# Patient Record
Sex: FEMALE | Race: WHITE | NOT HISPANIC OR LATINO | Employment: FULL TIME | URBAN - METROPOLITAN AREA
[De-identification: names, ages, dates, MRNs, and addresses within clinical notes are randomized per-mention and may not be internally consistent; named-entity substitution may affect disease eponyms.]

---

## 2017-03-16 ENCOUNTER — OFFICE VISIT (OUTPATIENT)
Dept: ALLERGY | Facility: CLINIC | Age: 37
End: 2017-03-16
Payer: COMMERCIAL

## 2017-03-16 VITALS
SYSTOLIC BLOOD PRESSURE: 104 MMHG | HEIGHT: 63 IN | OXYGEN SATURATION: 100 % | TEMPERATURE: 97.8 F | WEIGHT: 150.6 LBS | BODY MASS INDEX: 26.68 KG/M2 | HEART RATE: 93 BPM | DIASTOLIC BLOOD PRESSURE: 64 MMHG

## 2017-03-16 DIAGNOSIS — L29.9 PRURITIC DISORDER: Primary | ICD-10-CM

## 2017-03-16 DIAGNOSIS — R21 RASH: ICD-10-CM

## 2017-03-16 LAB
ALBUMIN SERPL-MCNC: 4.1 G/DL (ref 3.4–5)
ALP SERPL-CCNC: 88 U/L (ref 40–150)
ALT SERPL W P-5'-P-CCNC: 23 U/L (ref 0–50)
ANION GAP SERPL CALCULATED.3IONS-SCNC: 8 MMOL/L (ref 3–14)
AST SERPL W P-5'-P-CCNC: 11 U/L (ref 0–45)
BASOPHILS # BLD AUTO: 0 10E9/L (ref 0–0.2)
BASOPHILS NFR BLD AUTO: 0.1 %
BILIRUB SERPL-MCNC: 0.5 MG/DL (ref 0.2–1.3)
BUN SERPL-MCNC: 16 MG/DL (ref 7–30)
CALCIUM SERPL-MCNC: 8.9 MG/DL (ref 8.5–10.1)
CHLORIDE SERPL-SCNC: 103 MMOL/L (ref 94–109)
CO2 SERPL-SCNC: 27 MMOL/L (ref 20–32)
CREAT SERPL-MCNC: 0.68 MG/DL (ref 0.52–1.04)
DIFFERENTIAL METHOD BLD: NORMAL
EOSINOPHIL # BLD AUTO: 0.1 10E9/L (ref 0–0.7)
EOSINOPHIL NFR BLD AUTO: 1.3 %
ERYTHROCYTE [DISTWIDTH] IN BLOOD BY AUTOMATED COUNT: 12.4 % (ref 10–15)
GFR SERPL CREATININE-BSD FRML MDRD: NORMAL ML/MIN/1.7M2
GLUCOSE SERPL-MCNC: 83 MG/DL (ref 70–99)
HCT VFR BLD AUTO: 42 % (ref 35–47)
HGB BLD-MCNC: 14.1 G/DL (ref 11.7–15.7)
LYMPHOCYTES # BLD AUTO: 2.2 10E9/L (ref 0.8–5.3)
LYMPHOCYTES NFR BLD AUTO: 27.8 %
MCH RBC QN AUTO: 31.7 PG (ref 26.5–33)
MCHC RBC AUTO-ENTMCNC: 33.6 G/DL (ref 31.5–36.5)
MCV RBC AUTO: 94 FL (ref 78–100)
MONOCYTES # BLD AUTO: 0.6 10E9/L (ref 0–1.3)
MONOCYTES NFR BLD AUTO: 7.7 %
NEUTROPHILS # BLD AUTO: 5 10E9/L (ref 1.6–8.3)
NEUTROPHILS NFR BLD AUTO: 63.1 %
PLATELET # BLD AUTO: 271 10E9/L (ref 150–450)
POTASSIUM SERPL-SCNC: 3.9 MMOL/L (ref 3.4–5.3)
PROT SERPL-MCNC: 7.9 G/DL (ref 6.8–8.8)
RBC # BLD AUTO: 4.45 10E12/L (ref 3.8–5.2)
SODIUM SERPL-SCNC: 138 MMOL/L (ref 133–144)
TSH SERPL DL<=0.05 MIU/L-ACNC: 2.97 MU/L (ref 0.4–4)
WBC # BLD AUTO: 7.8 10E9/L (ref 4–11)

## 2017-03-16 PROCEDURE — 80053 COMPREHEN METABOLIC PANEL: CPT | Performed by: ALLERGY & IMMUNOLOGY

## 2017-03-16 PROCEDURE — 99203 OFFICE O/P NEW LOW 30 MIN: CPT | Performed by: ALLERGY & IMMUNOLOGY

## 2017-03-16 PROCEDURE — 86003 ALLG SPEC IGE CRUDE XTRC EA: CPT | Performed by: ALLERGY & IMMUNOLOGY

## 2017-03-16 PROCEDURE — 85025 COMPLETE CBC W/AUTO DIFF WBC: CPT | Performed by: ALLERGY & IMMUNOLOGY

## 2017-03-16 PROCEDURE — 99000 SPECIMEN HANDLING OFFICE-LAB: CPT | Performed by: ALLERGY & IMMUNOLOGY

## 2017-03-16 PROCEDURE — 83520 IMMUNOASSAY QUANT NOS NONAB: CPT | Mod: 90 | Performed by: ALLERGY & IMMUNOLOGY

## 2017-03-16 PROCEDURE — 84443 ASSAY THYROID STIM HORMONE: CPT | Performed by: ALLERGY & IMMUNOLOGY

## 2017-03-16 PROCEDURE — 36415 COLL VENOUS BLD VENIPUNCTURE: CPT | Performed by: ALLERGY & IMMUNOLOGY

## 2017-03-16 ASSESSMENT — ENCOUNTER SYMPTOMS
EYE ITCHING: 1
SORE THROAT: 0
MYALGIAS: 0
JOINT SWELLING: 0
VOMITING: 0
APPETITE CHANGE: 0
STRIDOR: 0
ARTHRALGIAS: 0
UNEXPECTED WEIGHT CHANGE: 0
NAUSEA: 1
COUGH: 0
ADENOPATHY: 0
SINUS PRESSURE: 0
SHORTNESS OF BREATH: 0
EYE DISCHARGE: 0
ROS SKIN COMMENTS: URTICARIA
EYE REDNESS: 0
FEVER: 0
DIARRHEA: 0
WHEEZING: 0
HEADACHES: 0
RHINORRHEA: 0

## 2017-03-16 NOTE — MR AVS SNAPSHOT
"              After Visit Summary   3/16/2017    Monet You    MRN: [id number]           Patient Information     Date Of Birth          1980        Visit Information        Provider Department      3/16/2017 10:00 AM Medardo Hyatt MD [facility]        Today's Diagnoses     Pruritic disorder    -  1    Rash          Care Instructions    \"Free and clear products\".    Stop wearing make up for 1 week.  -start Zyrtec (cetirizine) 10 mg daily. If partially works, try taking it twice a day.  -Get the bloodwork done.            Follow-ups after your visit        Who to contact     If you have questions or need follow up information about today's clinic visit or your schedule please contact [facility] directly at [phone].  Normal or non-critical lab and imaging results will be communicated to you by MyChart, letter or phone within 4 business days after the clinic has received the results. If you do not hear from us within 7 days, please contact the clinic through MyChart or phone. If you have a critical or abnormal lab result, we will notify you by phone as soon as possible.  Submit refill requests through [employer] or call your pharmacy and they will forward the refill request to us. Please allow 3 business days for your refill to be completed.          Additional Information About Your Visit        MyChart Information     [employer] gives you secure access to your electronic health record. If you see a primary care provider, you can also send messages to your care team and make appointments. If you have questions, please call your primary care clinic.  If you do not have a primary care provider, please call [phone] and they will assist you.        Care EveryWhere ID     This is your Care EveryWhere ID. This could be used by other organizations to access your [facility] medical records  [id number]        Your Vitals Were     Pulse Temperature Height Pulse Oximetry " "BMI (Body Mass Index)       93 97.8  F (36.6  C) (Oral) 5' 3.25\" (1.607 m) 100% 26.47 kg/m2        Blood Pressure from Last 3 Encounters:   03/16/17 104/64   08/12/16 107/70   08/05/16 117/80    Weight from Last 3 Encounters:   03/16/17 150 lb 9.6 oz (68.3 kg)   08/12/16 154 lb 9.6 oz (70.1 kg)   08/05/16 150 lb 14.4 oz (68.4 kg)              We Performed the Following     Allergen alternaria alternata IgE     Allergen sana white IgE     Allergen aspergillus fumigatus IgE     Allergen cat epithellium IgE     Allergen Cedar IgE     Allergen cladosporium herbarum IgE     Allergen cottonwood IgE     Allergen D farinae IgE     Allergen D pteronyssinus IgE     Allergen dog epithelium IgE     Allergen elm IgE     Allergen English plantain IgE     Allergen Epicoccum purpurascens IgE     Allergen giant ragweed IgE     Allergen Fermín grass IgE     Allergen lamb's quarter IgE     Allergen latex IgE     Allergen maple box elder IgE     Allergen Mugwort IgE     Allergen [place] White     Allergen oak white IgE     Allergen orchard grass IgE     Allergen penicillium notatum IgE     Allergen ragweed short IgE     Allergen Red [place] IgE     Allergen Sagebrush Wormwood IgE     Allergen Sheep Sorrel IgE     Allergen silver  birch IgE     Allergen thistle Russian IgE     Allergen jerry IgE     Allergen [place] Tree     Allergen Weed Nettle IgE     Allergen white pine IgE     Allergen, Kochia/Firebush     CBC with platelets and differential     Comprehensive metabolic panel     Tryptase     TSH          Today's Medication Changes          These changes are accurate as of: 3/16/17 10:57 AM.  If you have any questions, ask your nurse or doctor.               Stop taking these medicines if you haven't already. Please contact your care team if you have questions.     nystatin-triamcinolone cream   Commonly known as:  MYCOLOG II   Stopped by:  Medardo Hyatt MD                    Primary Care Provider Office Phone # Fax #    Sal " Kirsten Pradhan PA-C [phone]       [facility] [place] [address]  [place] [address]        Thank you!     Thank you for choosing [facility]  for your care. Our goal is always to provide you with excellent care. Hearing back from our patients is one way we can continue to improve our services. Please take a few minutes to complete the written survey that you may receive in the mail after your visit with us. Thank you!             Your Updated Medication List - Protect others around you: Learn how to safely use, store and throw away your medicines at www.disposemymeds.org.          This list is accurate as of: 3/16/17 10:57 AM.  Always use your most recent med list.                   Brand Name Dispense Instructions for use    MIRENA (52 MG) 20 MCG/24HR IUD   Generic drug:  levonorgestrel      1 each by Intrauterine route once.       MULTIVITAMIN PO      Take  by mouth daily.       neomycin-polymyxin-hydrocortisone 3.5-94932-0 otic suspension    CORTISPORIN    10 mL    Place 4 drops in ear(s) 4 times daily

## 2017-03-16 NOTE — PATIENT INSTRUCTIONS
"\"Free and clear products\".    Stop wearing make up for 1 week.  -start Zyrtec (cetirizine) 10 mg daily. If partially works, try taking it twice a day.  -Get the bloodwork done.      "

## 2017-03-16 NOTE — PROGRESS NOTES
SUBJECTIVE:                                                               Monet You presents today to our Allergy Clinic at [facility], she is being seen  for a new visit.    As you know, she is a 36 year old female with rash on her face and generalized pruritus for the last 6-8 weeks.  The patient states that she has generalized pruritus of the skin, including her body, extremities and face, associated with a sensation that something is crawling on her skin.On her face, she periodically develops a rash and sometimes hives. She noticed that facial lesions get worse if she is exposed on contact with wipes at her work. They use them to wipe phones and other objects.  The patient has a picture on her smart phone with some small erythematous lesions, some of them with a central pustules. However, she also states that she develops hives. She does not develop hives on her body.  She has tried taking cetirizine, 2-3 times a week. It is partially helpful.  She thinks that generalized pruritus and rash on her face is worse when she is at work.  In the past and noticed that foam for sanitizing hands and her work would cause hives so she is avoiding it.    She does not think that her symptoms are worse around pets. They do not have pets at her work. She is working as a registered nurse.    On occasions, with her symptoms she also may develop sneezing. Unclear if it is a coincidence or worsening of symptoms. Facial rash is not worse at home, but she still has pruritus even if she is at home for several days.        Patient Active Problem List   Diagnosis     CARDIOVASCULAR SCREENING; LDL GOAL LESS THAN 160     Mirena IUD (intrauterine device) in place       Past Medical History   Diagnosis Date     Encounter for insertion of mirena IUD 06/27/2016     Menarche age     cycles q     X    d      Problem (# of Occurrences) Relation (Name,Age of Onset)    Abdominal Aortic Aneurysm (1) Paternal  Grandfather    Breast Cancer (2) Maternal Grandmother (65), Paternal Grandmother: Ronni    Eye Disorder (1) Mother: glaucoma    Hypertension (4) Father, Maternal Grandmother, Paternal Grandmother, Paternal Grandfather    Thyroid Disease (1) Paternal Grandmother       Negative family history of: DIABETES, CEREBROVASCULAR DISEASE, Cancer - colorectal        Past Surgical History   Procedure Laterality Date     D & c  5/06     miscarriage     Social History     Social History     Marital status: Single     Spouse name: N/A     Number of children: N/A     Years of education: N/A     Social History Main Topics     Smoking status: Never Smoker     Smokeless tobacco: Never Used     Alcohol use 0.0 oz/week     0 Standard drinks or equivalent per week     Drug use: No     Sexual activity: Yes     Partners: Male     Birth control/ protection: IUD      Comment: Mirena     Other Topics Concern     None     Social History Narrative    March 16, 2017    ENVIRONMENTAL HISTORY: The family [place] in a [place] year [place] The home is heated with a gas furnace. They do have central air conditioning. The patient's bedroom is furnished with feather/wool bedding or pillows, carpeting in bedroom and allergen mattress cover.  Pets inside the house include 1 dog(s). There is not history of cockroach or mice infestation. There is/are 0 smokers in the house.  The house does not have a damp basement.                Review of Systems   Constitutional: Negative for appetite change, fever and unexpected weight change.   HENT: Positive for sneezing. Negative for congestion, dental problem, nosebleeds, postnasal drip, rhinorrhea, sinus pressure and sore throat.    Eyes: Positive for itching. Negative for discharge and redness.   Respiratory: Negative for cough, shortness of breath, wheezing and stridor.    Gastrointestinal: Positive for nausea. Negative for diarrhea and vomiting.   Musculoskeletal: Negative for arthralgias, joint  "swelling and myalgias.   Skin: Positive for rash.        urticaria   Neurological: Negative for headaches.   Hematological: Negative for adenopathy.   Psychiatric/Behavioral: Negative for behavioral problems and self-injury.           Current Outpatient Prescriptions:      neomycin-polymyxin-hydrocortisone (CORTISPORIN) 3.5-23513-0 otic suspension, Place 4 drops in ear(s) 4 times daily, Disp: 10 mL, Rfl: 0     levonorgestrel (MIRENA) 20 MCG/24HR IUD, 1 each by Intrauterine route once., Disp: , Rfl:      Multiple Vitamin (MULTIVITAMIN OR), Take  by mouth daily., Disp: , Rfl:   Immunization History   Administered Date(s) Administered     Influenza (IIV3) 10/20/2011, 01/07/2013     TDAP (BOOSTRIX AGES 10-64) 05/18/2012     Allergies   Allergen Reactions     Ceclor [Cefaclor] Hives     Reglan [Metoclopramide Hcl]      restless     OBJECTIVE:                                                                 /64 (BP Location: Right arm, Patient Position: Chair, Cuff Size: Adult Regular)  Pulse 93  Temp 97.8  F (36.6  C) (Oral)  Ht 5' 3.25\" (1.607 m)  Wt 150 lb 9.6 oz (68.3 kg)  SpO2 100%  BMI 26.47 kg/m2        Physical Exam   Constitutional: She is oriented to person, place, and time. No distress.   HENT:   Head: Normocephalic and atraumatic.   Right Ear: Tympanic membrane, external ear and ear canal normal.   Left Ear: Tympanic membrane, external ear and ear canal normal.   Nose: No mucosal edema or rhinorrhea.   Mouth/Throat: Oropharynx is clear and moist and mucous membranes are normal.   Eyes: Conjunctivae are normal. Right eye exhibits no discharge. Left eye exhibits no discharge.   Neck: Normal range of motion.   Cardiovascular: Normal rate, regular rhythm and normal heart sounds.    No murmur heard.  Pulmonary/Chest: Effort normal and breath sounds normal. No respiratory distress. She has no wheezes. She has no rales.   Musculoskeletal: Normal range of motion.   Lymphadenopathy:     She has no cervical " adenopathy.   Neurological: She is alert and oriented to person, place, and time.   Skin: Skin is warm. She is not diaphoretic.   Several small erythematous pustules on the cheeks and forehead. The patient states that they are getting worse at work. But she also may develop hives that are not present on today's exam.   Psychiatric: Mood, memory and affect normal.   Nursing note and vitals reviewed.        ASSESSMENT/PLAN:       Visit Diagnoses     1. Pruritic disorder    -  Primary  Her main symptom is not urticaria, but generalized pruritus associated with formication.  -Facial rash that is getting worse with disinfecting wipes, suggest a contact component, but I am not sure we can apply it to her pruritus as well.  -Advised not to wear makeup and see if it makes a difference. I am not able to perform percutaneous skin puncture testing or patch testing with those wipes at work.  -Start cetirizine 10 mg by mouth daily. If partial effect, she may try taking it twice a day. For her pruritus, alert CBC with differential, serum tryptase level CMP and TSH. Also added serum tryptase level.  The patient wonders if she could have environmental allergies, and I really doubt that since there is no pollen these days, and no pets at work. Ordered serum IgE for aeroallergen panel per patient's request. Unable to perform percutaneous skin puncture testing for aeroallergens since she took oral antihistamine within the last 7 days.    Relevant Orders    CBC with platelets and differential (Completed)    Tryptase (Completed)    Allergen latex IgE (Completed)    Comprehensive metabolic panel (Completed)    TSH (Completed)    Allergen cat epithellium IgE (Completed)    Allergen dog epithelium IgE (Completed)    Allergen Ferm[place] grass IgE (Completed)    Allergen orchard grass IgE (Completed)    Allergen jerry IgE (Completed)    Allergen D pteronyssinus IgE (Completed)    Allergen D farinae IgE (Completed)    Allergen alternaria  alternata IgE (Completed)    Allergen aspergillus fumigatus IgE (Completed)    Allergen cladosporium herbarum IgE (Completed)    Allergen Epicoccum purpurascens IgE (Completed)    Allergen penicillium notatum IgE (Completed)    Allergen sana white IgE (Completed)    Allergen Jerome IgE (Completed)    Allergen cottonwood IgE (Completed)    Allergen elm IgE (Completed)    Allergen maple box elder IgE (Completed)    Allergen [place] White (Completed)    Allergen white pine IgE (Completed)    Allergen [place] Tree (Completed)    Allergen silver  birch IgE (Completed)    Allergen Red [place] IgE (Completed)    Allergen oak white IgE (Completed)    Allergen English plantain IgE (Completed)    Allergen giant ragweed IgE (Completed)    Allergen lamb's quarter IgE (Completed)    Allergen Mugwort IgE (Completed)    Allergen ragweed short IgE (Completed)    Allergen, Kochia/Firebush (Completed)    Allergen thistle Russian IgE (Completed)    Allergen Sheep Sorrel IgE (Completed)    Allergen Sagebrush Wormwood IgE (Completed)    Allergen Weed Nettle IgE (Completed)    2. Rash      If she continues having symptoms, I recommend Dermatology evaluation. She may need patch testing. [facility] would be able to apply NACDG panel.          Follow-up will depend on the results of the blood work and symptom control.    Thank you for allowing us to participate in the care of this patient. Please feel free to contact us if there are any questions or concerns about the patient.    Disclaimer: This note consists of symbols derived from keyboarding, dictation and/or voice recognition software. As a result, there may be errors in the script that have gone undetected. Please consider this when interpreting information found in this chart.    Medardo Hyatt MD   Allergy, Asthma and Immunology  [place]

## 2017-03-16 NOTE — NURSING NOTE
"Chief Complaint   Patient presents with     Allergy Consult     itchy hives all over body for the last 6-8 weeks.        Initial /64 (BP Location: Right arm, Patient Position: Chair, Cuff Size: Adult Regular)  Pulse 93  Temp 97.8  F (36.6  C) (Oral)  Ht 5' 3.25\" (1.607 m)  Wt 150 lb 9.6 oz (68.3 kg)  SpO2 100%  BMI 26.47 kg/m2 Estimated body mass index is 26.47 kg/(m^2) as calculated from the following:    Height as of this encounter: 5' 3.25\" (1.607 m).    Weight as of this encounter: 150 lb 9.6 oz (68.3 kg).  Medication Reconciliation: complete    "

## 2017-03-18 LAB — TRYPTASE SERPL-MCNC: NORMAL NG/ML

## 2017-03-20 ENCOUNTER — MYC MEDICAL ADVICE (OUTPATIENT)
Dept: ALLERGY | Facility: CLINIC | Age: 37
End: 2017-03-20

## 2017-03-20 LAB
A ALTERNATA IGE QN: NORMAL KU(A)/L
A FUMIGATUS IGE QN: NORMAL KU(A)/L
C HERBARUM IGE QN: NORMAL KU(A)/L
CALIF WALNUT IGE QN: NORMAL
CAT DANDER IGG QN: NORMAL KU(A)/L
CEDAR IGE QN: NORMAL KU(A)/L
COCKSFOOT IGE QN: NORMAL KU(A)/L
COMMON RAGWEED IGE QN: NORMAL KU(A)/L
COTTONWOOD IGE QN: NORMAL KU(A)/L
D FARINAE IGE QN: 0.53 KU(A)/L
D PTERONYSS IGE QN: 0.55 KU(A)/L
DEPRECATED MISC ALLERGEN IGE RAST QL: NORMAL
DOG DANDER+EPITH IGE QN: NORMAL KU(A)/L
E PURPURASCENS IGE QN: NORMAL KU(A)/L
EAST WHITE PINE IGE QN: NORMAL KU(A)/L
ENGL PLANTAIN IGE QN: NORMAL KU(A)/L
FIREBUSH IGE QN: NORMAL KU(A)/L
GIANT RAGWEED IGE QN: NORMAL KU(A)/L
GOOSEFOOT IGE QN: NORMAL KU(A)/L
JOHNSON GRASS IGE QN: NORMAL KU(A)/L
LTX IGE QN: NORMAL KU(A)/L
MAPLE IGE QN: NORMAL KU(A)/L
MUGWORT IGE QN: NORMAL KU(A)/L
NETTLE IGE QN: NORMAL KU(A)/L
P NOTATUM IGE QN: NORMAL KU(A)/L
RED MULBERRY IGE QN: NORMAL KU(A)/L
SALTWORT IGE QN: NORMAL KU(A)/L
SHEEP SORREL IGE QN: NORMAL KU(A)/L
SILVER BIRCH IGE QN: NORMAL KU(A)/L
TIMOTHY IGE QN: NORMAL KU(A)/L
WHITE ASH IGE QN: NORMAL KU(A)/L
WHITE ELM IGE QN: NORMAL KU(A)/L
WHITE MULBERRY IGE QN: NORMAL
WHITE OAK IGE QN: NORMAL KU(A)/L
WORMWOOD IGE QN: NORMAL KU(A)/L

## 2017-03-30 ENCOUNTER — MYC MEDICAL ADVICE (OUTPATIENT)
Dept: ALLERGY | Facility: CLINIC | Age: 37
End: 2017-03-30

## 2017-03-30 NOTE — TELEPHONE ENCOUNTER
Dr. Hyatt, please see patient's note below.  She is wondering about latex?  I don't see mention of latex in your OV note.  [facility]  Thanks,    Susana Ghosh RN

## 2017-03-30 NOTE — PROGRESS NOTES
Normal CMP, including total bilirubin.  Normal serum tryptase.  Normal TSH.  Serum IgE for regional aeroallergens panel with very mild sensitivity for dust mite that should not explain generalized pruritus of the skin, that is sometimes associated with formication (a sensation that something is crawling under/on her skin).  Normal CBC with differential.  -The patient was recommended to start cetirizine 10 mg by mouth daily.  If she has partial effect, she may try taking it twice a day.  However, she continues having symptoms, I recommend Dermatology evaluation.

## 2017-03-30 NOTE — TELEPHONE ENCOUNTER
I apologize.  It was ordered, and was done. Latex IgE was negative as well.  Results for KEYUR ADAMARIS ANDRADE (MRN [id number]) as of 3/30/2017 15:45   Ref. Range 3/16/2017 10:55   Allergen Latex Latest Ref Range: <0.10 KU(A)/L <0.10...

## 2017-04-14 ENCOUNTER — MYC MEDICAL ADVICE (OUTPATIENT)
Dept: ALLERGY | Facility: CLINIC | Age: 37
End: 2017-04-14

## 2017-04-14 DIAGNOSIS — L29.9 PRURITIC DISORDER: Primary | ICD-10-CM

## 2017-04-14 NOTE — TELEPHONE ENCOUNTER
Dr. Hyatt, please see note below.  Referral order pended.  Please review, sign, and I can fax it to the # listed below.  Susana Ghosh RN

## 2017-04-18 NOTE — TELEPHONE ENCOUNTER
Paulo calling from [facility] statin they are needing clinic note to support the derm patch testing referral.   Please fax to [phone]    Ph. [phone]    Sil [place]  Specialty CSS

## 2017-04-18 NOTE — TELEPHONE ENCOUNTER
Clinic note faxed to [facility] Fax# [phone].     Malinda Haile RN

## 2017-06-14 ENCOUNTER — OFFICE VISIT (OUTPATIENT)
Dept: FAMILY MEDICINE | Facility: CLINIC | Age: 37
End: 2017-06-14
Payer: COMMERCIAL

## 2017-06-14 VITALS
HEART RATE: 84 BPM | TEMPERATURE: 98.4 F | OXYGEN SATURATION: 99 % | WEIGHT: 152 LBS | SYSTOLIC BLOOD PRESSURE: 119 MMHG | BODY MASS INDEX: 26.93 KG/M2 | DIASTOLIC BLOOD PRESSURE: 78 MMHG | HEIGHT: 63 IN

## 2017-06-14 DIAGNOSIS — L29.9 GENERALIZED PRURITUS: Primary | ICD-10-CM

## 2017-06-14 DIAGNOSIS — L50.1 CHRONIC IDIOPATHIC URTICARIA: ICD-10-CM

## 2017-06-14 PROCEDURE — 99213 OFFICE O/P EST LOW 20 MIN: CPT | Performed by: FAMILY MEDICINE

## 2017-06-14 RX ORDER — LEVOCETIRIZINE DIHYDROCHLORIDE 5 MG/1
5 TABLET, FILM COATED ORAL EVERY EVENING
Qty: 30 TABLET | Refills: 1 | Status: SHIPPED | OUTPATIENT
Start: 2017-06-14 | End: 2018-06-21

## 2017-06-14 RX ORDER — LORATADINE 10 MG/1
10 TABLET ORAL 2 TIMES DAILY
COMMUNITY
End: 2017-06-14

## 2017-06-14 NOTE — MR AVS SNAPSHOT
After Visit Summary   6/14/2017    Monet You    MRN: [id number]           Patient Information     Date Of Birth          1980        Visit Information        Provider Department      6/14/2017 6:30 PM Helen Pal MD [facility]        Today's Diagnoses     Generalized pruritus    -  1    Chronic idiopathic urticaria          Care Instructions      Understanding Hives (Urticaria)  Urticaria (hives) are red, itchy, and swollen areas on the skin. They are most often an allergic reaction from eating a food or taking a medicine. Sometimes the cause may be unknown. A single hive can vary in size from a half inch to several inches. Hives can appear all over the body. Or they may appear on only one part of the body.  Causes of Hives  Hives can be caused by food and beverages such as:    Nuts    Peanuts    Eggs    Shellfish    Milk  Hives can also be caused by medicines such as:    Antibiotics, especially penicillin and sulfa-based medicines     Anticonvulsant or antiseizure medicines     Chemotherapy medicines   Other causes of hives include:    Infection or virus    Heat    Cold air or cold water    Exercise    Scratching or rubbing your skin, or wearing tight-fitting clothes that rub your skin    Being exposed to sunlight or light from a light bulb, in rare cases  In some cases, hives may occur again and again with no specific cause.  If you have hives    Avoid the food, drink, medicine, or other factor that may be causing the hives.    Make a thick paste of baking soda and water. Apply the paste directly to your skin. This can help lessen itching.    Talk with your healthcare provider right away if you think a medicine gave you hives.  Watch for anaphalaxis  If you have hives, watch for symptoms of a severe reaction that affects your entire body. This is called anaphylaxis. Symptoms can include swollen areas of the body, wheezing, trouble breathing or swallowing, and a  hoarse voice. This reaction may happen right away. Or it may happen in an hour or more. In extreme cases, the airways from mouth to lungs may swell and prevent breathing. This is a medical emergency. Use epinephrine medicine if you have it, and call 911 or go to the emergency room.     When to call your healthcare provider  Call your healthcare provider if:    Your hives feel uncomfortable    You have never had hives before  When to call 911  Call 911 right away if you have:    Swelling in your lips, tongue, or throat, called angioedema    Trouble breathing or swallowing   Date Last Reviewed: 9/20/2015 2000-2017 [employer]. [address] [place] [address]. All rights reserved. This information is not intended as a substitute for professional medical care. Always follow your healthcare professional's instructions.                Follow-ups after your visit        Follow-up notes from your care team     Return if symptoms worsen or fail to improve.      Your next 10 appointments already scheduled     Jul 11, 2017 11:30 AM CDT   (Arrive by 11:15 AM)   New Patient Visit with Janes Lynn MD   [facility] ([facility])    [address] [place] [id number] [phone]              Who to contact     Normal or non-critical lab and imaging results will be communicated to you by MyChart, letter or phone within 4 business days after the clinic has received the results. If you do not hear from us within 7 days, please contact the clinic through MyChart or phone. If you have a critical or abnormal lab result, we will notify you by phone as soon as possible.  Submit refill requests through [employer] or call your pharmacy and they will forward the refill request to us. Please allow 3 business days for your refill to be completed.          If you need to speak with a  for additional information , please call:  "[phone]             Additional Information About Your Visit        [employer] Information     [employer] gives you secure access to your electronic health record. If you see a primary care provider, you can also send messages to your care team and make appointments. If you have questions, please call your primary care clinic.  If you do not have a primary care provider, please call [phone] and they will assist you.        Care EveryWhere ID     This is your Care EveryWhere ID. This could be used by other organizations to access your [place] medical records  [id number]        Your Vitals Were     Pulse Temperature Height Pulse Oximetry BMI (Body Mass Index)       84 98.4  F (36.9  C) (Tympanic) 5' 3.25\" (1.607 m) 99% 26.71 kg/m2        Blood Pressure from Last 3 Encounters:   06/14/17 119/78   03/16/17 104/64   08/12/16 107/70    Weight from Last 3 Encounters:   06/14/17 152 lb (68.9 kg)   03/16/17 150 lb 9.6 oz (68.3 kg)   08/12/16 154 lb 9.6 oz (70.1 kg)              Today, you had the following     No orders found for display         Today's Medication Changes          These changes are accurate as of: 6/14/17  7:02 PM.  If you have any questions, ask your nurse or doctor.               Start taking these medicines.        Dose/Directions    levocetirizine 5 MG tablet   Commonly known as:  XYZAL   Used for:  Chronic idiopathic urticaria, Generalized pruritus        Dose:  5 mg   Take 1 tablet (5 mg) by mouth every evening   Quantity:  30 tablet   Refills:  1         Stop taking these medicines if you haven't already. Please contact your care team if you have questions.     loratadine 10 MG tablet   Commonly known as:  CLARITIN                Where to get your medicines      Some of these will need a paper prescription and others can be bought over the counter.  Ask your nurse if you have questions.     Bring a paper prescription for each of these medications     levocetirizine 5 MG tablet                " Primary Care Provider Office Phone # Fax #    Sal Pradhan PA-C [phone]       [facility] [address] [place] [address]        Thank you!     Thank you for choosing [facility]  for your care. Our goal is always to provide you with excellent care. Hearing back from our patients is one way we can continue to improve our services. Please take a few minutes to complete the written survey that you may receive in the mail after your visit with us. Thank you!             Your Updated Medication List - Protect others around you: Learn how to safely use, store and throw away your medicines at www.disposemymeds.org.          This list is accurate as of: 6/14/17  7:02 PM.  Always use your most recent med list.                   Brand Name Dispense Instructions for use    levocetirizine 5 MG tablet    XYZAL    30 tablet    Take 1 tablet (5 mg) by mouth every evening       MIRENA (52 MG) 20 MCG/24HR IUD   Generic drug:  levonorgestrel      1 each by Intrauterine route once.       MULTIVITAMIN PO      Take  by mouth daily.

## 2017-06-14 NOTE — PROGRESS NOTES
"  SUBJECTIVE:                                                    Monet You is a 37 year old female who presents to clinic today for the following health issues:      Derm Problem- Itchy Skin x 4-5 months  Saw dermatology, will be having a skin patch test completed in July.   Sometimes feels something is crawling on her.   Will at times break out in rash and hives after itching.   Patient is unclear about the causative agent.    Denies having any rash at this time.  No fever or chills.    Problem list and histories reviewed & adjusted, as indicated.  Additional history: as documented    Current Outpatient Prescriptions   Medication Sig Dispense Refill     loratadine (CLARITIN) 10 MG tablet Take 10 mg by mouth 2 times daily       levonorgestrel (MIRENA) 20 MCG/24HR IUD 1 each by Intrauterine route once.       Multiple Vitamin (MULTIVITAMIN OR) Take  by mouth daily.       Allergies   Allergen Reactions     Ceclor [Cefaclor] Hives     Reglan [Metoclopramide Hcl]      restless       Reviewed and updated as needed this visit by clinical staff       Reviewed and updated as needed this visit by Provider         ROS:  Constitutional, HEENT, cardiovascular, pulmonary, gi and gu systems are negative, except as otherwise noted.    OBJECTIVE:                                                    /78  Pulse 84  Temp 98.4  F (36.9  C) (Tympanic)  Ht 5' 3.25\" (1.607 m)  Wt 152 lb (68.9 kg)  SpO2 99%  BMI 26.71 kg/m2  Body mass index is 26.71 kg/(m^2).  GENERAL: healthy, alert and no distress  SKIN; No rash or suspicious skin lesions noted on exam today.    Diagnostic Test Results:  none      ASSESSMENT/PLAN:                                                    Monet was seen today for derm problem.    Diagnoses and all orders for this visit:    Generalized pruritus  -    Trial: levocetirizine (XYZAL) 5 MG tablet; Take 1 tablet (5 mg) by mouth every evening    Chronic idiopathic urticaria  -    Trial: " levocetirizine (XYZAL) 5 MG tablet; Take 1 tablet (5 mg) by mouth every evening.    Follow up with Allergist as previously scheduled       Follow up if symptoms fail to improve or worsen.      The patient was in agreement with the plan today and had no questions or concerns prior to leaving the clinic.        Helen Pal MD  [facility]

## 2017-06-15 NOTE — PATIENT INSTRUCTIONS
Understanding Hives (Urticaria)  Urticaria (hives) are red, itchy, and swollen areas on the skin. They are most often an allergic reaction from eating a food or taking a medicine. Sometimes the cause may be unknown. A single hive can vary in size from a half inch to several inches. Hives can appear all over the body. Or they may appear on only one part of the body.  Causes of Hives  Hives can be caused by food and beverages such as:    Nuts    Peanuts    Eggs    Shellfish    Milk  Hives can also be caused by medicines such as:    Antibiotics, especially penicillin and sulfa-based medicines     Anticonvulsant or antiseizure medicines     Chemotherapy medicines   Other causes of hives include:    Infection or virus    Heat    Cold air or cold water    Exercise    Scratching or rubbing your skin, or wearing tight-fitting clothes that rub your skin    Being exposed to sunlight or light from a light bulb, in rare cases  In some cases, hives may occur again and again with no specific cause.  If you have hives    Avoid the food, drink, medicine, or other factor that may be causing the hives.    Make a thick paste of baking soda and water. Apply the paste directly to your skin. This can help lessen itching.    Talk with your healthcare provider right away if you think a medicine gave you hives.  Watch for anaphalaxis  If you have hives, watch for symptoms of a severe reaction that affects your entire body. This is called anaphylaxis. Symptoms can include swollen areas of the body, wheezing, trouble breathing or swallowing, and a hoarse voice. This reaction may happen right away. Or it may happen in an hour or more. In extreme cases, the airways from mouth to lungs may swell and prevent breathing. This is a medical emergency. Use epinephrine medicine if you have it, and call 911 or go to the emergency room.     When to call your healthcare provider  Call your healthcare provider if:    Your hives feel uncomfortable    You  have never had hives before  When to call 911  Call 911 right away if you have:    Swelling in your lips, tongue, or throat, called angioedema    Trouble breathing or swallowing   Date Last Reviewed: 9/20/2015 2000-2017 [employer]. [address] [place] [address]. All rights reserved. This information is not intended as a substitute for professional medical care. Always follow your healthcare professional's instructions.

## 2017-10-26 NOTE — PROGRESS NOTES
SUBJECTIVE:   CC: Monet You is an 37 year old woman who presents for preventive health visit.     Healthy Habits:    Do you get at least three servings of calcium containing foods daily (dairy, green leafy vegetables, etc.)? yes    Amount of exercise or daily activities, outside of work: 3 day(s) per week    Problems taking medications regularly No    Medication side effects: No    Have you had an eye exam in the past two years? yes    Do you see a dentist twice per year? yes    Do you have sleep apnea, excessive snoring or daytime drowsiness?no        PROBLEMS TO ADD ON...  None    Today's PHQ-2 Score:   PHQ-2 ( 1999 [employer]) 10/27/2017 6/1/2016   Q1: Little interest or pleasure in doing things 0 0   Q2: Feeling down, depressed or hopeless 0 0   PHQ-2 Score 0 0       Abuse: Current or Past(Physical, Sexual or Emotional)- No  Do you feel safe in your environment - Yes    Social History   Substance Use Topics     Smoking status: Never Smoker     Smokeless tobacco: Never Used     Alcohol use 0.0 oz/week     0 Standard drinks or equivalent per week     The patient does not drink >3 drinks per day nor >7 drinks per week.    Patient informed that anything we discuss that is not related to preventative medicine, may be billed for; patient verbalizes understanding.    Reviewed orders with patient.  Reviewed health maintenance and updated orders accordingly - Yes  Labs reviewed in EPIC    Mammogram not appropriate for this patient based on age.    Pertinent mammograms are reviewed under the imaging tab.  History of abnormal Pap smear:   NO - age 30- 65 PAP every 3 years recommended  Last 3 Pap Results:   PAP (no units)   Date Value   06/27/2016 NIL   08/14/2013 NIL   08/10/2010 NIL       Reviewed and updated as needed this visit by clinical staff  Tobacco  Allergies  Meds  Med Hx  Surg Hx  Fam Hx  Soc Hx        Reviewed and updated as needed this visit by Provider        Past Medical History:  "  Diagnosis Date     Encounter for insertion of mirena IUD 06/27/2016     Menarche age    cycles q     X    d        ROS:  Other than what is noted in the HPI and PMH a complete review of systems is otherwise negative including: Constitutional, HEENT, endocrine, cardiovascular, respiratory, GI/, musculoskeletal, neuro, and psychiatric.     OBJECTIVE:   /74  Pulse 80  Temp 98.7  F (37.1  C) (Oral)  Ht 5' 3.25\" (1.607 m)  Wt 149 lb (67.6 kg)  SpO2 98%  BMI 26.19 kg/m2  EXAM:  GENERAL: healthy, alert and no distress  EYES: Eyes grossly normal to inspection, PERRL and conjunctivae and sclerae normal  HENT: ear canals and TM's normal, nose and mouth without ulcers or lesions  NECK: no adenopathy, no asymmetry, masses, or scars and thyroid normal to palpation  RESP: lungs clear to auscultation - no rales, rhonchi or wheezes  BREAST: normal without masses, tenderness or nipple discharge and no palpable axillary masses or adenopathy  CV: regular rate and rhythm, normal S1 S2, no S3 or S4, no murmur, click or rub, no peripheral edema and peripheral pulses strong  ABDOMEN: soft, nontender, no hepatosplenomegaly, no masses and bowel sounds normal  MS: no gross musculoskeletal defects noted, no edema  SKIN: no suspicious lesions or rashes  NEURO: Normal strength and tone, mentation intact and speech normal  PSYCH: mentation appears normal, affect normal/bright    ASSESSMENT/PLAN:       ICD-10-CM    1. Encounter for routine adult health examination without abnormal findings Z00.00        Screenings discussed. Follow up annually.      COUNSELING:   Reviewed preventive health counseling, as reflected in patient instructions       reports that she has never smoked. She has never used smokeless tobacco.    Estimated body mass index is 26.19 kg/(m^2) as calculated from the following:    Height as of this encounter: 5' 3.25\" (1.607 m).    Weight as of this encounter: 149 lb (67.6 kg).       Counseling Resources:  ATP IV " Guidelines  Pooled Cohorts Equation Calculator  Breast Cancer Risk Calculator  FRAX Risk Assessment  ICSI Preventive Guidelines  Dietary Guidelines for Americans, 2010  USDA's MyPlate  ASA Prophylaxis  Lung CA Screening    Bianka Villaseñor PA-C  [facility]

## 2017-10-27 ENCOUNTER — OFFICE VISIT (OUTPATIENT)
Dept: FAMILY MEDICINE | Facility: CLINIC | Age: 37
End: 2017-10-27
Payer: COMMERCIAL

## 2017-10-27 VITALS
TEMPERATURE: 98.7 F | WEIGHT: 149 LBS | SYSTOLIC BLOOD PRESSURE: 112 MMHG | BODY MASS INDEX: 26.4 KG/M2 | HEIGHT: 63 IN | DIASTOLIC BLOOD PRESSURE: 74 MMHG | HEART RATE: 80 BPM | OXYGEN SATURATION: 98 %

## 2017-10-27 DIAGNOSIS — Z00.00 ENCOUNTER FOR ROUTINE ADULT HEALTH EXAMINATION WITHOUT ABNORMAL FINDINGS: Primary | ICD-10-CM

## 2017-10-27 PROCEDURE — 99395 PREV VISIT EST AGE 18-39: CPT | Performed by: PHYSICIAN ASSISTANT

## 2017-10-27 NOTE — MR AVS SNAPSHOT
After Visit Summary   10/27/2017    Monet You    MRN: [id number]           Patient Information     Date Of Birth          1980        Visit Information        Provider Department      10/27/2017 11:00 AM Bianka Villaseñor PA-C [facility]        Care Instructions      Preventive Health Recommendations  Female Ages 26 - 39  Yearly exam:   See your health care provider every year in order to    Review health changes.     Discuss preventive care.      Review your medicines if you your doctor has prescribed any.    Until age 30: Get a Pap test every three years (more often if you have had an abnormal result).    After age 30: Talk to your doctor about whether you should have a Pap test every 3 years or have a Pap test with HPV screening every 5 years.   You do not need a Pap test if your uterus was removed (hysterectomy) and you have not had cancer.  You should be tested each year for STDs (sexually transmitted diseases), if you're at risk.   Talk to your provider about how often to have your cholesterol checked.  If you are at risk for diabetes, you should have a diabetes test (fasting glucose).  Shots: Get a flu shot each year. Get a tetanus shot every 10 years.   Nutrition:     Eat at least 5 servings of fruits and vegetables each day.    Eat whole-grain bread, whole-wheat pasta and brown rice instead of white grains and rice.    Talk to your provider about Calcium and Vitamin D.     Lifestyle    Exercise at least 150 minutes a week (30 minutes a day, 5 days of the week). This will help you control your weight and prevent disease.    Limit alcohol to one drink per day.    No smoking.     Wear sunscreen to prevent skin cancer.    See your dentist every six months for an exam and cleaning.            Follow-ups after your visit        Who to contact     Normal or non-critical lab and imaging results will be communicated to you by MyChart, letter or phone within 4  "business days after the clinic has received the results. If you do not hear from us within 7 days, please contact the clinic through [employer] or phone. If you have a critical or abnormal lab result, we will notify you by phone as soon as possible.  Submit refill requests through [employer] or call your pharmacy and they will forward the refill request to us. Please allow 3 business days for your refill to be completed.          If you need to speak with a  for additional information , please call: [phone]             Additional Information About Your Visit        [employer] Information     [employer] gives you secure access to your electronic health record. If you see a primary care provider, you can also send messages to your care team and make appointments. If you have questions, please call your primary care clinic.  If you do not have a primary care provider, please call [phone] and they will assist you.        Care EveryWhere ID     This is your Care EveryWhere ID. This could be used by other organizations to access your [place] medical records  [id number]        Your Vitals Were     Pulse Temperature Height Pulse Oximetry BMI (Body Mass Index)       80 98.7  F (37.1  C) (Oral) 5' 3.25\" (1.607 m) 98% 26.19 kg/m2        Blood Pressure from Last 3 Encounters:   10/27/17 112/74   06/14/17 119/78   03/16/17 104/64    Weight from Last 3 Encounters:   10/27/17 149 lb (67.6 kg)   06/14/17 152 lb (68.9 kg)   03/16/17 150 lb 9.6 oz (68.3 kg)              Today, you had the following     No orders found for display       Primary Care Provider Office Phone # Fax #    Bianka Villaseñor PA-C [phone]       [address] [place] [address]        Equal Access to Services     [facility] BENNETT : Julian Murillo, waaxda luqadaha, qaybta kaalmada eloisa, cesar pope. So [facility] [phone].    ATENCIÓN: Si habla español, tiene a sainz disposición " servicios gratuitos de asistencia lingüística. Michelle hernandez [phone].    We comply with applicable federal civil rights laws and [place] laws. We do not discriminate on the basis of race, color, national origin, age, disability, sex, sexual orientation, or gender identity.            Thank you!     Thank you for choosing [facility]  for your care. Our goal is always to provide you with excellent care. Hearing back from our patients is one way we can continue to improve our services. Please take a few minutes to complete the written survey that you may receive in the mail after your visit with us. Thank you!             Your Updated Medication List - Protect others around you: Learn how to safely use, store and throw away your medicines at www.disposemymeds.org.          This list is accurate as of: 10/27/17 11:30 AM.  Always use your most recent med list.                   Brand Name Dispense Instructions for use Diagnosis    levocetirizine 5 MG tablet    XYZAL    30 tablet    Take 1 tablet (5 mg) by mouth every evening    Chronic idiopathic urticaria, Generalized pruritus       MIRENA (52 MG) 20 MCG/24HR IUD   Generic drug:  levonorgestrel      1 each by Intrauterine route once.        MULTIVITAMIN PO      Take  by mouth daily.

## 2018-06-21 ENCOUNTER — OFFICE VISIT (OUTPATIENT)
Dept: FAMILY MEDICINE | Facility: CLINIC | Age: 38
End: 2018-06-21
Payer: COMMERCIAL

## 2018-06-21 VITALS
WEIGHT: 140.8 LBS | SYSTOLIC BLOOD PRESSURE: 119 MMHG | OXYGEN SATURATION: 100 % | DIASTOLIC BLOOD PRESSURE: 78 MMHG | TEMPERATURE: 97.8 F | HEART RATE: 81 BPM | RESPIRATION RATE: 16 BRPM | BODY MASS INDEX: 24.95 KG/M2 | HEIGHT: 63 IN

## 2018-06-21 DIAGNOSIS — H60.393 INFECTIVE OTITIS EXTERNA, BILATERAL: Primary | ICD-10-CM

## 2018-06-21 PROCEDURE — 99213 OFFICE O/P EST LOW 20 MIN: CPT | Performed by: NURSE PRACTITIONER

## 2018-06-21 RX ORDER — CIPROFLOXACIN AND DEXAMETHASONE 3; 1 MG/ML; MG/ML
4 SUSPENSION/ DROPS AURICULAR (OTIC) 2 TIMES DAILY
Qty: 4 ML | Refills: 0 | Status: SHIPPED | OUTPATIENT
Start: 2018-06-21 | End: 2018-07-01

## 2018-06-21 NOTE — NURSING NOTE
"Chief Complaint   Patient presents with     Ent Problem       Initial /78  Pulse 81  Temp 97.8  F (36.6  C) (Oral)  Resp 16  Ht 5' 2.6\" (1.59 m)  Wt 140 lb 12.8 oz (63.9 kg)  SpO2 100%  BMI 25.26 kg/m2 Estimated body mass index is 25.26 kg/(m^2) as calculated from the following:    Height as of this encounter: 5' 2.6\" (1.59 m).    Weight as of this encounter: 140 lb 12.8 oz (63.9 kg).  Medication Reconciliation: complete     Manjula Maher MA  "

## 2018-06-21 NOTE — PROGRESS NOTES
SUBJECTIVE:  Monet You  is a 38 year old  female  who presents with the following concerns;              Symptoms: Present Comment   Fever/Chills     Fatigue     Muscle Aches     Eye Irritation     Sneezing     Nasal Zac/Drg     Sinus Pressure/Pain     Loss of smell     Dental pain     Sore Throat     Swollen Glands     Ear Pain/Fullness x Both ears   Cough     Wheeze     Chest Pain     Shortness of breath     Rash     Other       Symptom duration:  tuesday Sympom severity:  severe   Treatments tried:  ibu   Contacts:  none       Medications updated and reviewed.  Past, family and surgical history is updated and reviewed in the record.  Patient Active Problem List    Diagnosis Date Noted     Mirena IUD (intrauterine device) in place 12/01/2011     Priority: Medium     6/2016 Mirena placed       Past Medical History:   Diagnosis Date     Encounter for insertion of mirena IUD 06/27/2016     Menarche age    cycles q     X    d      Family History   Problem Relation Age of Onset     Eye Disorder Mother      glaucoma     Hypertension Father      Diabetes Father      Type 2     Breast Cancer Maternal Grandmother 65     Hypertension Maternal Grandmother      Hypertension Paternal Grandmother      Thyroid Disease Paternal Grandmother      Breast Cancer Paternal Grandmother      Ronni     Abdominal Aortic Aneurysm Paternal Grandfather      Hypertension Paternal Grandfather      Cerebrovascular Disease No family hx of      Cancer - colorectal No family hx of      ROS:  Other than noted above, general, HEENT, respiratory, cardiac and gastrointestinal systems are negative.    OBJECTIVE:  GENERAL:  Alert, no acute distress  EYES:  PERRL, EOM normal, conjunctiva and lids normal  HEENT: TMs normal, oral mucosa and posterior oropharynx normal POSITIVE for edematous erythematous ear canals with purulent drainage. L>R. Pt reports hx of external ear infections  RESP:  Lungs clear to auscultation.  CV:  Normal rate,  regular rhythm, no murmur or gallop.  NEURO:  Alert, oriented, speech and mentation normal    Assessment/Plan:     ICD-10-CM    1. Infective otitis externa, bilateral H60.393 ciprofloxacin-dexamethasone (CIPRODEX) otic suspension     DISCONTINUED: ciprofloxacin-hydrocortisone (CIPRO HC) otic suspension          See patient instructions: Use drops as prescribed. Antibiotic and steroid are in one drop.  Follow up if needed.     Fartun Reid, APRN, FNP-BC

## 2018-06-21 NOTE — PATIENT INSTRUCTIONS
Use drops as prescribed. Antibiotic and steroid are in one drop.  Follow up if needed.       External Ear Infection (Adult)    External otitis (also called  swimmer s ear ) is an infection in the ear canal. It is often caused by bacteria or fungus. It can occur a few days after water gets trapped in the ear canal (from swimming or bathing). It can also occur after cleaning too deeply in the ear canal with a cotton swab or other object. Sometimes, hair care products get into the ear canal and cause this problem.  Symptoms can include pain, fever, itching, redness, drainage, or swelling of the ear canal. Temporary hearing loss may also occur.  Home care    Do not try to clean the ear canal. This can push pus and bacteria deeper into the canal.    Use prescribed ear drops as directed. These help reduce swelling and fight the infection. If an ear wick was placed in the ear canal, apply drops right onto the end of the wick. The wick will draw the medicine into the ear canal even if it is swollen closed.    A cotton ball may be loosely placed in the outer ear to absorb any drainage.    You may use acetaminophen or ibuprofen to control pain, unless another medicine was prescribed. Note: If you have chronic liver or kidney disease or ever had a stomach ulcer or GI bleeding, talk to your healthcare provider before taking any of these medicines.    Do not allow water to get into your ear when bathing. Also, don't swim until the infection has cleared.  Prevention    Keep your ears dry. This helps lower the risk of infection. Dry your ears with a towel or hair dryer after getting wet. Also, use ear plugs when swimming.    Do not stick any objects in the ear to remove wax.    If you feel water trapped in your ear, use ear drops right away. You can get these drops over the counter at most drugstores. They work by removing water from the ear canal.  Follow-up care  Follow up with your healthcare provider in 1 week, or as  advised.  When to seek medical advice  Call your healthcare provider right away if any of these occur:    Ear pain becomes worse or doesn t improve after 3 days of treatment    Redness or swelling of the outer ear occurs or gets worse    Headache    Painful or stiff neck    Drowsiness or confusion    Fever of 100.4 F (38 C) or higher, or as directed by your healthcare provider    Seizure  Date Last Reviewed: 10/1/2017    3907-6956 [employer] [address] [place] [address], [place] [address]. All rights reserved. This information is not intended as a substitute for professional medical care. Always follow your healthcare professional's instructions.

## 2018-06-21 NOTE — MR AVS SNAPSHOT
After Visit Summary   6/21/2018    Monet You    MRN: [id number]           Patient Information     Date Of Birth          1980        Visit Information        Provider Department      6/21/2018 4:20 PM Fartun Reid NP [facility]        Today's Diagnoses     Infective otitis externa, bilateral    -  1      Care Instructions    Use drops as prescribed. Antibiotic and steroid are in one drop.  Follow up if needed.       External Ear Infection (Adult)    External otitis (also called  swimmer s ear ) is an infection in the ear canal. It is often caused by bacteria or fungus. It can occur a few days after water gets trapped in the ear canal (from swimming or bathing). It can also occur after cleaning too deeply in the ear canal with a cotton swab or other object. Sometimes, hair care products get into the ear canal and cause this problem.  Symptoms can include pain, fever, itching, redness, drainage, or swelling of the ear canal. Temporary hearing loss may also occur.  Home care    Do not try to clean the ear canal. This can push pus and bacteria deeper into the canal.    Use prescribed ear drops as directed. These help reduce swelling and fight the infection. If an ear wick was placed in the ear canal, apply drops right onto the end of the wick. The wick will draw the medicine into the ear canal even if it is swollen closed.    A cotton ball may be loosely placed in the outer ear to absorb any drainage.    You may use acetaminophen or ibuprofen to control pain, unless another medicine was prescribed. Note: If you have chronic liver or kidney disease or ever had a stomach ulcer or GI bleeding, talk to your healthcare provider before taking any of these medicines.    Do not allow water to get into your ear when bathing. Also, don't swim until the infection has cleared.  Prevention    Keep your ears dry. This helps lower the risk of infection. Dry your ears with a towel or  hair dryer after getting wet. Also, use ear plugs when swimming.    Do not stick any objects in the ear to remove wax.    If you feel water trapped in your ear, use ear drops right away. You can get these drops over the counter at most drugstores. They work by removing water from the ear canal.  Follow-up care  Follow up with your healthcare provider in 1 week, or as advised.  When to seek medical advice  Call your healthcare provider right away if any of these occur:    Ear pain becomes worse or doesn t improve after 3 days of treatment    Redness or swelling of the outer ear occurs or gets worse    Headache    Painful or stiff neck    Drowsiness or confusion    Fever of 100.4 F (38 C) or higher, or as directed by your healthcare provider    Seizure  Date Last Reviewed: 10/1/2017    1979-1139 [employer] [address] [place] [address]. All rights reserved. This information is not intended as a substitute for professional medical care. Always follow your healthcare professional's instructions.                Follow-ups after your visit        Follow-up notes from your care team     Return if symptoms worsen or fail to improve.      Who to contact     Normal or non-critical lab and imaging results will be communicated to you by [employer], letter or phone within 4 business days after the clinic has received the results. If you do not hear from us within 7 days, please contact the clinic through [employer] or phone. If you have a critical or abnormal lab result, we will notify you by phone as soon as possible.  Submit refill requests through [employer] or call your pharmacy and they will forward the refill request to us. Please allow 3 business days for your refill to be completed.          If you need to speak with a  for additional information , please call: [phone]             Additional Information About Your Visit        SureVisit Information     [employer] gives you secure  "access to your electronic health record. If you see a primary care provider, you can also send messages to your care team and make appointments. If you have questions, please call your primary care clinic.  If you do not have a primary care provider, please call [phone] and they will assist you.        Care EveryWhere ID     This is your Care EveryWhere ID. This could be used by other organizations to access your [facility] medical records  [id number]        Your Vitals Were     Pulse Temperature Respirations Height Pulse Oximetry BMI (Body Mass Index)    81 97.8  F (36.6  C) (Oral) 16 5' 2.6\" (1.59 m) 100% 25.26 kg/m2       Blood Pressure from Last 3 Encounters:   06/21/18 119/78   10/27/17 112/74   06/14/17 119/78    Weight from Last 3 Encounters:   06/21/18 140 lb 12.8 oz (63.9 kg)   10/27/17 149 lb (67.6 kg)   06/14/17 152 lb (68.9 kg)              Today, you had the following     No orders found for display         Today's Medication Changes          These changes are accurate as of 6/21/18  4:42 PM.  If you have any questions, ask your nurse or doctor.               Start taking these medicines.        Dose/Directions    ciprofloxacin-hydrocortisone otic suspension   Commonly known as:  CIPRO HC   Used for:  Infective otitis externa, bilateral   Started by:  Fartun Reid NP        Dose:  3 drop   Place 3 drops into both ears 2 times daily for 10 days   Quantity:  3 mL   Refills:  0            Where to get your medicines      These medications were sent to [facility] [place] - [address] [facility]  [address] [facility] [address] [place] [address]     Phone:  [phone]     ciprofloxacin-hydrocortisone otic suspension                Primary Care Provider Office Phone # Fax #    Bianka Villaseñor PA-C [phone] [address] [place] [address]        Equal Access to Services     OSIEL GUAJARDO AH: Hadii alley ku hadasho Soomaali, waaxda luqadaha, qaybta kaalmada " cesar aminkylie hemalathalatasha mccordaan ah. [facility] [phone].    ATENCIÓN: Si habla brad, tiene a sainz disposición servicios gratuitos de asistencia lingüística. Michelle al [phone].    We comply with applicable federal civil rights laws and [place] laws. We do not discriminate on the basis of race, color, national origin, age, disability, sex, sexual orientation, or gender identity.            Thank you!     Thank you for choosing [facility]  for your care. Our goal is always to provide you with excellent care. Hearing back from our patients is one way we can continue to improve our services. Please take a few minutes to complete the written survey that you may receive in the mail after your visit with us. Thank you!             Your Updated Medication List - Protect others around you: Learn how to safely use, store and throw away your medicines at www.disposemymeds.org.          This list is accurate as of 6/21/18  4:42 PM.  Always use your most recent med list.                   Brand Name Dispense Instructions for use Diagnosis    ciprofloxacin-hydrocortisone otic suspension    CIPRO HC    3 mL    Place 3 drops into both ears 2 times daily for 10 days    Infective otitis externa, bilateral       MIRENA (52 MG) 20 MCG/24HR IUD   Generic drug:  levonorgestrel      1 each by Intrauterine route once.        MULTIVITAMIN PO      Take  by mouth daily.

## 2018-06-25 ENCOUNTER — TELEPHONE (OUTPATIENT)
Dept: FAMILY MEDICINE | Facility: CLINIC | Age: 38
End: 2018-06-25

## 2018-06-25 NOTE — TELEPHONE ENCOUNTER
She was seen 6/21 for ear infection.  The left ear is fine now, but is still having trouble with the right ear.  Please call today to advise.

## 2018-06-25 NOTE — TELEPHONE ENCOUNTER
Patient only half way through antibiotic.  Left message on voice mail for patient to call clinic.[phone]

## 2018-06-26 NOTE — TELEPHONE ENCOUNTER
Left message on voice mail for patient to call clinic. [phone]  Guera Parsons RN

## 2018-09-03 ENCOUNTER — OFFICE VISIT (OUTPATIENT)
Dept: URGENT CARE | Facility: URGENT CARE | Age: 38
End: 2018-09-03
Payer: COMMERCIAL

## 2018-09-03 VITALS
SYSTOLIC BLOOD PRESSURE: 131 MMHG | TEMPERATURE: 98.6 F | OXYGEN SATURATION: 98 % | RESPIRATION RATE: 16 BRPM | WEIGHT: 137 LBS | HEART RATE: 69 BPM | BODY MASS INDEX: 24.58 KG/M2 | DIASTOLIC BLOOD PRESSURE: 80 MMHG

## 2018-09-03 DIAGNOSIS — H10.32 ACUTE CONJUNCTIVITIS OF LEFT EYE, UNSPECIFIED ACUTE CONJUNCTIVITIS TYPE: Primary | ICD-10-CM

## 2018-09-03 PROCEDURE — 99213 OFFICE O/P EST LOW 20 MIN: CPT | Performed by: INTERNAL MEDICINE

## 2018-09-03 RX ORDER — POLYMYXIN B SULFATE AND TRIMETHOPRIM 1; 10000 MG/ML; [USP'U]/ML
2 SOLUTION OPHTHALMIC 4 TIMES DAILY
Qty: 3 ML | Refills: 0 | Status: SHIPPED | OUTPATIENT
Start: 2018-09-03 | End: 2018-09-10

## 2018-09-03 NOTE — PROGRESS NOTES
SUBJECTIVE:  Monet You is an 38 year old female who presents for eye issue.  During night woke up with eye crusted and wiped it off.  This morning was crusty again. Left eye mostly affected and feels dry.  Right eye has itched but not had drainage.  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  No recent travel.  No cough or runny nose.  No fevers.  No v/d.  No skin rashes.  Vision is normal, no double vision.  No eye trauma.         has a past medical history of Encounter for insertion of mirena IUD (06/27/2016) and Menarche (age).  Social History     Social History     Marital status: Single     Spouse name: N/A     Number of children: N/A     Years of education: N/A     Social History Main Topics     Smoking status: Never Smoker     Smokeless tobacco: Never Used     Alcohol use 0.0 oz/week     0 Standard drinks or equivalent per week     Drug use: No     Sexual activity: Yes     Partners: Male     Birth control/ protection: IUD      Comment: Mirena     Other Topics Concern     None     Social History Narrative    March 16, 2017    ENVIRONMENTAL HISTORY: The family [place] in a [place] year [place] The home is heated with a gas furnace. They do have central air conditioning. The patient's bedroom is furnished with feather/wool bedding or pillows, carpeting in bedroom and allergen mattress cover.  Pets inside the house include 1 dog(s). There is not history of cockroach or mice infestation. There are no smokers in the house.  The house does not have a damp basement.          Family History   Problem Relation Age of Onset     Eye Disorder Mother      glaucoma     Hypertension Father      Diabetes Father      Type 2     Breast Cancer Maternal Grandmother 65     Hypertension Maternal Grandmother      Hypertension Paternal Grandmother      Thyroid Disease Paternal Grandmother      Breast Cancer Paternal Grandmother      Ronni     Abdominal Aortic Aneurysm Paternal Grandfather       Hypertension Paternal Grandfather      Cerebrovascular Disease No family hx of      Cancer - colorectal No family hx of        ALLERGIES:  Ceclor [cefaclor]; Parabens; and Reglan [metoclopramide hcl]    Current Outpatient Prescriptions   Medication     Cyanocobalamin (VITAMIN B 12 PO)     levonorgestrel (MIRENA) 20 MCG/24HR IUD     Multiple Vitamin (MULTIVITAMIN OR)     No current facility-administered medications for this visit.          ROS:  ROS is done and is negative for general/constitutional, eye, ENT, Respiratory, cardiovascular, GI, , Skin, musculoskeletal except as noted elsewhere.  All other review of systems negative except as noted elsewhere.      OBJECTIVE:  /80 (BP Location: Left arm, Patient Position: Chair, Cuff Size: Adult Regular)  Pulse 69  Temp 98.6  F (37  C) (Oral)  Resp 16  Wt 137 lb (62.1 kg)  SpO2 98%  BMI 24.58 kg/m2  GENERAL APPEARANCE: Alert, in no acute distress  EYES: mild conjunctival erythema bilaterally, slightly worse on left  EARS: External ears normal. Canals clear. TM's normal.  NOSE:mildly inflamed mucosa  OROPHARYNX:normal  NECK:No adenopathy,masses or thyromegaly  RESP: normal and clear to auscultation  CV:regular rate and rhythm and no murmurs, clicks, or gallops  ABDOMEN: Abdomen soft, non-tender. BS normal. No masses, organomegaly  SKIN: no ulcers, lesions or rash  MUSCULOSKELETAL:Musculoskeletal normal      RESULTS  .  No results found for this or any previous visit (from the past 48 hour(s)).    ASSESSMENT/PLAN:    ASSESSMENT / PLAN:  (H10.32) Acute conjunctivitis of left eye, unspecified acute conjunctivitis type  (primary encounter diagnosis)  Comment: possible allergy component, but concern for infectious etiology as well, so will tx with abx eye drops.  Plan: trimethoprim-polymyxin b (POLYTRIM) ophthalmic         solution        Reviewed medication instructions and side effects. Follow up if experiences side effects.. I reviewed supportive care,  expected course, and signs of concern.  Follow up as needed or if she does not improve within 5 day(s) or if worsens in any way.  Reviewed red flag symptoms and is to go to the ER if experiences any of these.       See [facility] for orders, medications, letters, patient instructions    Mandy Saldana M.D.

## 2018-09-03 NOTE — MR AVS SNAPSHOT
After Visit Summary   9/3/2018    Monet You    MRN: [id number]           Patient Information     Date Of Birth          1980        Visit Information        Provider Department      9/3/2018 10:05 AM Mandy Saldana MD [facility]        Today's Diagnoses     Acute conjunctivitis of left eye, unspecified acute conjunctivitis type    -  1       Follow-ups after your visit        Follow-up notes from your care team     Return if symptoms worsen or fail to improve, for follow up with primary doctor.      Who to contact     If you have questions or need follow up information about today's clinic visit or your schedule please contact [facility] directly at [phone].  Normal or non-critical lab and imaging results will be communicated to you by MyChart, letter or phone within 4 business days after the clinic has received the results. If you do not hear from us within 7 days, please contact the clinic through Reply[employer]hart or phone. If you have a critical or abnormal lab result, we will notify you by phone as soon as possible.  Submit refill requests through [facility] or call your pharmacy and they will forward the refill request to us. Please allow 3 business days for your refill to be completed.          Additional Information About Your Visit        MyChart Information     [facility] gives you secure access to your electronic health record. If you see a primary care provider, you can also send messages to your care team and make appointments. If you have questions, please call your primary care clinic.  If you do not have a primary care provider, please call [phone] and they will assist you.        Care EveryWhere ID     This is your Care EveryWhere ID. This could be used by other organizations to access your [facility] medical records  [id number]        Your Vitals Were     Pulse Temperature Respirations Pulse Oximetry BMI (Body Mass Index)        69 98.6  F (37  C) (Oral) 16 98% 24.58 kg/m2        Blood Pressure from Last 3 Encounters:   09/03/18 131/80   06/21/18 119/78   10/27/17 112/74    Weight from Last 3 Encounters:   09/03/18 137 lb (62.1 kg)   06/21/18 140 lb 12.8 oz (63.9 kg)   10/27/17 149 lb (67.6 kg)              Today, you had the following     No orders found for display         Today's Medication Changes          These changes are accurate as of 9/3/18 10:45 AM.  If you have any questions, ask your nurse or doctor.               Start taking these medicines.        Dose/Directions    trimethoprim-polymyxin b ophthalmic solution   Commonly known as:  POLYTRIM   Used for:  Acute conjunctivitis of left eye, unspecified acute conjunctivitis type   Started by:  Mandy Saldana MD        Dose:  2 drop   Place 2 drops into both eyes 4 times daily for 7 days   Quantity:  3 mL   Refills:  0            Where to get your medicines      These medications were sent to [facility] - [place], [place] - [address]  [address], [facility] [address]     Phone:  [phone]     trimethoprim-polymyxin b ophthalmic solution                Primary Care Provider Office Phone # Fax #    Bianka Villaseñor PA-C [phone] [address] [place] [facility] [address]        Equal Access to Services     [facility] AH: Hadii aad ku hadasho Soomaali, waaxda luqadaha, qaybta kaalmada adeegyada, waxay idiin hayaan delano martin . So [facility] [phone].    ATENCIÓN: Si habla español, tiene a sainz disposición servicios gratuitos de asistencia lingüística. Llame al [phone].    We comply with applicable federal civil rights laws and [place] laws. We do not discriminate on the basis of race, color, national origin, age, disability, sex, sexual orientation, or gender identity.            Thank you!     Thank you for choosing [facility]  for your care. Our goal is always to provide you with  excellent care. Hearing back from our patients is one way we can continue to improve our services. Please take a few minutes to complete the written survey that you may receive in the mail after your visit with us. Thank you!             Your Updated Medication List - Protect others around you: Learn how to safely use, store and throw away your medicines at www.disposemymeds.org.          This list is accurate as of 9/3/18 10:45 AM.  Always use your most recent med list.                   Brand Name Dispense Instructions for use Diagnosis    MIRENA (52 MG) 20 MCG/24HR IUD   Generic drug:  levonorgestrel      1 each by Intrauterine route once.        MULTIVITAMIN PO      Take  by mouth daily.        trimethoprim-polymyxin b ophthalmic solution    POLYTRIM    3 mL    Place 2 drops into both eyes 4 times daily for 7 days    Acute conjunctivitis of left eye, unspecified acute conjunctivitis type       VITAMIN B 12 PO

## 2019-02-05 ENCOUNTER — OFFICE VISIT (OUTPATIENT)
Dept: FAMILY MEDICINE | Facility: CLINIC | Age: 39
End: 2019-02-05
Payer: COMMERCIAL

## 2019-02-05 VITALS
TEMPERATURE: 97.8 F | WEIGHT: 138 LBS | HEART RATE: 86 BPM | DIASTOLIC BLOOD PRESSURE: 81 MMHG | OXYGEN SATURATION: 96 % | SYSTOLIC BLOOD PRESSURE: 115 MMHG | BODY MASS INDEX: 24.76 KG/M2

## 2019-02-05 DIAGNOSIS — J22 LOWER RESPIRATORY INFECTION: Primary | ICD-10-CM

## 2019-02-05 DIAGNOSIS — J11.1 INFLUENZA-LIKE ILLNESS: ICD-10-CM

## 2019-02-05 PROCEDURE — 99213 OFFICE O/P EST LOW 20 MIN: CPT | Performed by: PHYSICIAN ASSISTANT

## 2019-02-05 RX ORDER — AZITHROMYCIN 250 MG/1
TABLET, FILM COATED ORAL
Qty: 6 TABLET | Refills: 0 | Status: SHIPPED | OUTPATIENT
Start: 2019-02-05 | End: 2019-05-02

## 2019-02-05 NOTE — LETTER
[facility]  [address] [facility] [address]  Phone: [phone]    February 5, 2019        Monet You  [address] [facility] [address]          To whom it may concern:    RE: Monet You    Patient was seen and treated today at our clinic. Off work today and tomorrow. May return 2/7/2019.    Please contact me for questions or concerns.      Sincerely,        Marifer Martin PA-C

## 2019-02-05 NOTE — PROGRESS NOTES
SUBJECTIVE:   Monet You is a 38 year old female who presents to clinic today for the following health issues:      RESPIRATORY SYMPTOMS      Duration: X 6 days    Description-   Acute onset of fever up to 101 with body aches, rhinorrhea, facial pain/pressure, cough, fever, fatigue/malaise and nausea. Getting worse. Deep productive cough. Did have an influenza shot    Severity: moderate    Accompanying signs and symptoms: None    History (predisposing factors):  none    Precipitating or alleviating factors: None    Therapies tried and outcome:  Ibuprofen, Tylenol      Not asthmatic      Allergies   Allergen Reactions     Ceclor [Cefaclor] Hives     Parabens Itching     Reglan [Metoclopramide Hcl]      restless       Past Medical History:   Diagnosis Date     Encounter for insertion of mirena IUD 2016     Menarche age    cycles q     X    d         Current Outpatient Medications on File Prior to Visit:  Cyanocobalamin (VITAMIN B 12 PO)    levonorgestrel (MIRENA) 20 MCG/24HR IUD 1 each by Intrauterine route once.   Multiple Vitamin (MULTIVITAMIN OR) Take  by mouth daily.   [] ciprofloxacin-dexamethasone (CIPRODEX) otic suspension Place 4 drops into both ears 2 times daily for 10 days   levonorgestrel (MIRENA, 52 MG,) 20 MCG/24HR IUD 1 each (20 mcg) by Intrauterine route once for 1 dose   [] trimethoprim-polymyxin b (POLYTRIM) ophthalmic solution Place 2 drops into both eyes 4 times daily for 7 days     No current facility-administered medications on file prior to visit.     Social History     Tobacco Use     Smoking status: Never Smoker     Smokeless tobacco: Never Used   Substance Use Topics     Alcohol use: Yes     Alcohol/week: 0.0 oz       ROS:  CONSTITUTIONAL: Negative for fatigue or fever.  EYES: Negative for eye problems.  ENT: As above.  RESP: As above.  CV: Negative for chest pains.  GI: Negative for vomiting.  MUSCULOSKELETAL:  Negative for significant muscle or joint  pains.  NEUROLOGIC: Negative for headaches.  SKIN: Negative for rash.    OBJECTIVE:  /81   Pulse 86   Temp 97.8  F (36.6  C) (Oral)   Wt 62.6 kg (138 lb)   SpO2 96%   BMI 24.76 kg/m    GENERAL APPEARANCE: Healthy, alert and no distress.  EYES:Conjunctiva/sclera clear.  EARS: No cerumen.   Ear canals w/o erythema.  TM's intact w/o erythema.    NOSE/MOUTH: Nose without ulcers, erythema or lesions.  SINUSES: No maxillary sinus tenderness.  THROAT: No erythema w/o tonsillar enlargement . No exudates.  NECK: Supple, nontender, no lymphadenopathy.  RESP: Lungs with some mild decreased breath sounds. No rales,rhonchi or wheezes  CV: Regular rate and rhythm, normal S1 S2, no murmur noted.  NEURO: Awake, alert    SKIN: No rashes        ASSESSMENT:     ICD-10-CM    1. Lower respiratory infection J22 azithromycin (ZITHROMAX Z-JULIO) 250 MG tablet   2. Influenza-like illness R69 azithromycin (ZITHROMAX Z-JULIO) 250 MG tablet       PLAN:I suspect she had influenza and now has secondary bacterial lower respiratory infection. Will treat. OOW today and tomorrow. Lots of rest and fluids.  RTC if any worsening symptoms or if not improving.    Marifer Martin PA-C

## 2019-04-10 ENCOUNTER — APPOINTMENT (OUTPATIENT)
Dept: GENERAL RADIOLOGY | Facility: CLINIC | Age: 39
End: 2019-04-10
Attending: FAMILY MEDICINE
Payer: COMMERCIAL

## 2019-04-10 ENCOUNTER — HOSPITAL ENCOUNTER (EMERGENCY)
Facility: CLINIC | Age: 39
Discharge: HOME | End: 2019-04-10
Attending: EMERGENCY MEDICINE | Admitting: EMERGENCY MEDICINE
Payer: COMMERCIAL

## 2019-04-10 ENCOUNTER — MYC MEDICAL ADVICE (OUTPATIENT)
Dept: FAMILY MEDICINE | Facility: CLINIC | Age: 39
End: 2019-04-10

## 2019-04-10 VITALS
SYSTOLIC BLOOD PRESSURE: 128 MMHG | DIASTOLIC BLOOD PRESSURE: 85 MMHG | RESPIRATION RATE: 16 BRPM | TEMPERATURE: 98.1 F | HEART RATE: 88 BPM | OXYGEN SATURATION: 98 %

## 2019-04-10 DIAGNOSIS — S90.31XA CONTUSION OF RIGHT FOOT, INITIAL ENCOUNTER: ICD-10-CM

## 2019-04-10 PROCEDURE — 99283 EMERGENCY DEPT VISIT LOW MDM: CPT

## 2019-04-10 PROCEDURE — 99283 EMERGENCY DEPT VISIT LOW MDM: CPT | Mod: Z6 | Performed by: EMERGENCY MEDICINE

## 2019-04-10 PROCEDURE — 73630 X-RAY EXAM OF FOOT: CPT | Mod: RT

## 2019-04-10 ASSESSMENT — ENCOUNTER SYMPTOMS: WOUND: 1

## 2019-04-10 NOTE — TELEPHONE ENCOUNTER
Patient would like an order to have her foot xrayed she works at [employer] and has 12 hours shifts.  It would be easy for her to do that there.  Please call when ready.  Thank you

## 2019-04-10 NOTE — ED AVS SNAPSHOT
[facility], [place], Emergency Department  [address]  [facility] [place] [place] [address]  Phone:  [phone]  Fax:  [phone]                                    Monet You   MRN: [id number]    Department:  [facility], Emergency Department   Date of Visit:  4/10/2019           After Visit Summary Signature Page    I have received my discharge instructions, and my questions have been answered. I have discussed any challenges I see with this plan with the nurse or doctor.    ..........................................................................................................................................  Patient/Patient Representative Signature      ..........................................................................................................................................  Patient Representative Print Name and Relationship to Patient    ..................................................               ................................................  Date                                   Time    ..........................................................................................................................................  Reviewed by Signature/Title    ...................................................              ..............................................  Date                                               Time          22EPIC Rev 08/18

## 2019-04-11 NOTE — TELEPHONE ENCOUNTER
Message sent via my chart that she needs an appt.  Since we have some SD, can you also call patient to see if you can assist in scheduling?  Guera Parsons RN

## 2019-04-11 NOTE — DISCHARGE INSTRUCTIONS
Your right foot x-ray was read as no acute fracture.  Weightbearing as tolerated, ibuprofen or Tylenol for pain.  Watch the abrasion for signs of infection.  Follow-up with your clinic

## 2019-04-11 NOTE — ED PROVIDER NOTES
[facility] EMERGENCY DEPARTMENT ([facility])    4/10/19       History     Chief Complaint   Patient presents with     Foot Pain     R foot: foot hit wall: 24 hrs ago: swelling: with inceased pain     The history is provided by the patient.     Monet You is a 39 year old female who was riding a hover board, wearing socks, yesterday when she struck a wall and injured her right foot.  She sustained an abrasion to the medial aspect, near the great toe.  She has ecchymotic discoloration and most of the pain is in the fourth digit.  She has been able to ambulate on it.    This part of the medical record was transcribed by Atilio Faye Medical Scribe, from a dictation done by Javier Lundy MD.       I have reviewed the Medications, Allergies, Past Medical and Surgical History, and Social History in the [email or web address] system.    Past Medical History:   Diagnosis Date     Encounter for insertion of mirena IUD 06/27/2016     Menarche age    cycles q     X    d       Past Surgical History:   Procedure Laterality Date     D & C  5/06    miscarriage       Family History   Problem Relation Age of Onset     Eye Disorder Mother         glaucoma     Hypertension Father      Diabetes Father         Type 2     Breast Cancer Maternal Grandmother 65     Hypertension Maternal Grandmother      Hypertension Paternal Grandmother      Thyroid Disease Paternal Grandmother      Breast Cancer Paternal Grandmother         Ronni     Abdominal Aortic Aneurysm Paternal Grandfather      Hypertension Paternal Grandfather      Cerebrovascular Disease No family hx of      Cancer - colorectal No family hx of        Social History     Tobacco Use     Smoking status: Never Smoker     Smokeless tobacco: Never Used   Substance Use Topics     Alcohol use: Yes     Alcohol/week: 0.0 oz       No current facility-administered medications for this encounter.      Current Outpatient Medications   Medication     azithromycin (ZITHROMAX Z-JULIO)  250 MG tablet     Cyanocobalamin (VITAMIN B 12 PO)     levonorgestrel (MIRENA) 20 MCG/24HR IUD     Multiple Vitamin (MULTIVITAMIN OR)        Allergies   Allergen Reactions     Ceclor [Cefaclor] Hives     Parabens Itching     Reglan [Metoclopramide Hcl]      restless         Review of Systems   Musculoskeletal:        Positive for right foot pain   Skin: Positive for wound (abrasion to medial, right foot).   All other systems reviewed and are negative.      Physical Exam   BP: 128/85  Pulse: 88  Temp: 98.1  F (36.7  C)  Resp: 16  SpO2: 98 %      Physical Exam   Constitutional: No distress.   Neurological: She is alert.   Nursing note and vitals reviewed.      ED Course        Procedures        Results for orders placed or performed during the hospital encounter of 04/10/19   Foot  XR, G/E 3 views, right    Narrative    FOOT RIGHT THREE OR MORE VIEWS   4/10/2019 8:23 PM     HISTORY: Pain.    COMPARISON: None.       Impression    IMPRESSION: No acute fracture or dislocation.    MOON OMALLEY MD            Labs Ordered and Resulted from Time of ED Arrival Up to the Time of Departure from the ED - No data to display         Assessments & Plan (with Medical Decision Making)   39-year-old female with a right foot contusion.  X-ray of the foot was performed and read as negative for fracture or dislocation.  Ibuprofen for pain, weightbearing as tolerated, follow-up with a PCP.    This part of the medical record was transcribed by Atilio Faye, Medical Scribe, from a dictation done by Javier Lundy MD.       I have reviewed the nursing notes.    I have reviewed the findings, diagnosis, plan and need for follow up with the patient.       Medication List      There are no discharge medications for this visit.         Final diagnoses:   Contusion of right foot, initial encounter       4/10/2019   [facility], [place], EMERGENCY DEPARTMENT     Javier Lundy MD  04/16/19 0977

## 2019-04-18 ENCOUNTER — ANCILLARY PROCEDURE (OUTPATIENT)
Dept: GENERAL RADIOLOGY | Facility: CLINIC | Age: 39
End: 2019-04-18
Attending: FAMILY MEDICINE
Payer: COMMERCIAL

## 2019-04-18 ENCOUNTER — OFFICE VISIT (OUTPATIENT)
Dept: ORTHOPEDICS | Facility: CLINIC | Age: 39
End: 2019-04-18
Payer: COMMERCIAL

## 2019-04-18 VITALS — DIASTOLIC BLOOD PRESSURE: 74 MMHG | BODY MASS INDEX: 25.3 KG/M2 | WEIGHT: 141 LBS | SYSTOLIC BLOOD PRESSURE: 116 MMHG

## 2019-04-18 DIAGNOSIS — S99.921A INJURY OF RIGHT FOOT, INITIAL ENCOUNTER: ICD-10-CM

## 2019-04-18 DIAGNOSIS — S99.921A INJURY OF RIGHT FOOT, INITIAL ENCOUNTER: Primary | ICD-10-CM

## 2019-04-18 PROCEDURE — 99203 OFFICE O/P NEW LOW 30 MIN: CPT | Performed by: FAMILY MEDICINE

## 2019-04-18 PROCEDURE — 73630 X-RAY EXAM OF FOOT: CPT | Mod: RT

## 2019-04-18 NOTE — LETTER
4/18/2019         RE: Monet You  [address]  [place] [address]        Dear Colleague,    Thank you for referring your patient, Monet You, to the [facility] [place]. Please see a copy of my visit note below.    ASSESSMENT & PLAN  Monet was seen today for pain.    Diagnoses and all orders for this visit:    Injury of right foot, initial encounter  -     XR Foot Right G/E 3 Views; Future      Patient is a 39 year old female presenting for  evaluation of   Chief Complaint   Patient presents with     Right Foot - Pain    Notable after axial impact on great toe and foot while on hoverboard on 4/9/19.  Pain persisting with pt in normal shoes.  Repeat XR negative.  Differential non-displaced toe fx, plantar plate injury with no pain over Lis Franc joint.  Plan to brace as below and f/u in 3 weeks.   Treatment: post-op shoe, supportive sandals at home  Physical Therapy none  Injection none  Medications  Limited NSAIDs/Tylenol    Concerning signs/sx that would warrant urgent evaluation were discussed.  All questions were answered, patient understands and agrees with plan.      Return in about 3 weeks (around 5/9/2019).      -----    SUBJECTIVE  Monet You is a/an 39 year old female who is seen as a self referral for evaluation of right foot pain. The patient is seen with their daughter.    Onset: 4/9/19, 9 day(s) ago. Patient describes injury as riding hover board and she hit a wall.  Patient presented to ED 4/10/19.  Pain overall stable  Location of Pain: right 1st, 4th, and 5th digit  Rating of Pain at worst: 8/10  Rating of Pain Currently: 5/10  Worsened by: walking  Better with: rest   Treatments tried: elevation, ice, Tylenol and ibuprofen  Associated symptoms: bruising and tingling   Orthopedic history: NO  Relevant surgical history: NO  Past Medical History:   Diagnosis Date     Encounter for insertion of mirena IUD 06/27/2016      Menarche age    cycles q     X    d     Social History     Socioeconomic History     Marital status: Single     Spouse name: Not on file     Number of children: Not on file     Years of education: Not on file     Highest education level: Not on file   Occupational History     Not on file   Social Needs     Financial resource strain: Not on file     Food insecurity:     Worry: Not on file     Inability: Not on file     Transportation needs:     Medical: Not on file     Non-medical: Not on file   Tobacco Use     Smoking status: Never Smoker     Smokeless tobacco: Never Used   Substance and Sexual Activity     Alcohol use: Yes     Alcohol/week: 0.0 oz     Drug use: No     Sexual activity: Yes     Partners: Male     Birth control/protection: IUD     Comment: Mirena   Lifestyle     Physical activity:     Days per week: Not on file     Minutes per session: Not on file     Stress: Not on file   Relationships     Social connections:     Talks on phone: Not on file     Gets together: Not on file     Attends Pent[employer]al service: Not on file     Active member of club or organization: Not on file     Attends meetings of clubs or organizations: Not on file     Relationship status: Not on file     Intimate partner violence:     Fear of current or ex partner: Not on file     Emotionally abused: Not on file     Physically abused: Not on file     Forced sexual activity: Not on file   Other Topics Concern     Parent/sibling w/ CABG, MI or angioplasty before 65F 55M? Not Asked   Social History Narrative    March 16, 2017    ENVIRONMENTAL HISTORY: The [place]. The home is heated with a gas furnace. They do have central air conditioning. The patient's bedroom is furnished with feather/wool bedding or pillows, carpeting in bedroom and allergen mattress cover.  Pets inside the house include 1 dog(s). There is not history of cockroach or mice infestation. There are no smokers in the house.   The house does not have a damp basement.          Patient's past medical, surgical, social, and family histories were reviewed today and no changes are noted.    REVIEW OF SYSTEMS:  10 point ROS is negative other than symptoms noted above in HPI, Past Medical History or as stated below  Constitutional: NEGATIVE for fever, chills, change in weight  Skin: NEGATIVE for worrisome rashes, moles or lesions  GI/: NEGATIVE for bowel or bladder changes  Neuro: NEGATIVE for weakness, dizziness or paresthesias    OBJECTIVE:  /74   Wt 64 kg (141 lb)   BMI 25.30 kg/m      General: healthy, alert and in no distress  HEENT: no scleral icterus or conjunctival erythema  Skin: no suspicious lesions or rash. No jaundice.  CV:  no pedal edema  Resp: normal respiratory effort without conversational dyspnea   Psych: normal mood and affect  Gait: normal steady gait with appropriate coordination and balance  Neuro: Normal light sensory exam of lower extremity  MSK:  RIGHT FOOT  Inspection:   Pes planus, Ecchymosis and mild swelling over dorsal metatarsals, small laceration over medial 1st MTP  Palpation:    Tender about the 1st, 4th, 5th MTP joints. Otherwise remainder of bony/ligamentous landmarks are non-tender.   Range of Motion:     Grossly intact and non-painful  Strength:    Grossly intact in all planes  Special Tests:    Positive: None    Negative: anterior drawer, heel strike, calcaneal squeeze, Tinel's (tarsal tunnel), Tinel's (webspace), MTP stress and Lisfranc joint tenderness    RIGHT ANKLE  Inspection:    No swelling or ecchymosis is observed  Palpation:  Nontender.  Range of Motion:     Plantarflexion full / dorsiflexion full / inversion full / eversion full  Strength:    full  Special Tests:    negative anterior drawer, negative talar tilt, negative valgus stress, negative forced external rotation/eversion, negative Silver sign, negative squeeze test. Able to perform heel raise and Unable to hop.    Independent  visualization of the below image:  Recent Results (from the past 24 hour(s))   XR Foot Right G/E 3 Views    Narrative    RIGHT FOOT THREE OR MORE VIEWS   4/18/2019 6:06 PM     HISTORY: Injury of right foot, initial encounter.      Impression    IMPRESSION:   1. No evidence of acute fracture. However, if there is continued  clinical suspicion for midfoot occult fracture, CT is recommended.  2. Mild dorsal degenerative spurring is noted in the first metatarsal  head and at the talonavicular joint. Joint space width is relatively  well preserved.  3. Flat foot deformity is noted.  4. There may be a great toe distal phalangeal medial subungual  exostosis.          Patient's conditions were thoroughly discussed during today's visit with greater than 50% of the visit spent counseling the patient with total time spent face-to-face with the patient being 30 minutes.    Edward Marvin MD, [facility]        Again, thank you for allowing me to participate in the care of your patient.        Sincerely,        Edward Marvin MD

## 2019-04-18 NOTE — PROGRESS NOTES
ASSESSMENT & PLAN  Monet was seen today for pain.    Diagnoses and all orders for this visit:    Injury of right foot, initial encounter  -     XR Foot Right G/E 3 Views; Future      Patient is a 39 year old female presenting for  evaluation of   Chief Complaint   Patient presents with     Right Foot - Pain    Notable after axial impact on great toe and foot while on hoverboard on 4/9/19.  Pain persisting with pt in normal shoes.  Repeat XR negative.  Differential non-displaced toe fx, plantar plate injury with no pain over Lis Franc joint.  Plan to brace as below and f/u in 3 weeks.   Treatment: post-op shoe, supportive sandals at home  Physical Therapy none  Injection none  Medications  Limited NSAIDs/Tylenol    Concerning signs/sx that would warrant urgent evaluation were discussed.  All questions were answered, patient understands and agrees with plan.      Return in about 3 weeks (around 5/9/2019).      -----    SUBJECTIVE  Monet You is a/an 39 year old female who is seen as a self referral for evaluation of right foot pain. The patient is seen with their daughter.    Onset: 4/9/19, 9 day(s) ago. Patient describes injury as riding hover board and she hit a wall.  Patient presented to ED 4/10/19.  Pain overall stable  Location of Pain: right 1st, 4th, and 5th digit  Rating of Pain at worst: 8/10  Rating of Pain Currently: 5/10  Worsened by: walking  Better with: rest   Treatments tried: elevation, ice, Tylenol and ibuprofen  Associated symptoms: bruising and tingling   Orthopedic history: NO  Relevant surgical history: NO  Past Medical History:   Diagnosis Date     Encounter for insertion of mirena IUD 06/27/2016     Menarche age    cycles q     X    d     Social History     Socioeconomic History     Marital status: Single     Spouse name: Not on file     Number of children: Not on file     Years of education: Not on file     Highest education level: Not on file   Occupational History     Not on  file   Social Needs     Financial resource strain: Not on file     Food insecurity:     Worry: Not on file     Inability: Not on file     Transportation needs:     Medical: Not on file     Non-medical: Not on file   Tobacco Use     Smoking status: Never Smoker     Smokeless tobacco: Never Used   Substance and Sexual Activity     Alcohol use: Yes     Alcohol/week: 0.0 oz     Drug use: No     Sexual activity: Yes     Partners: Male     Birth control/protection: IUD     Comment: Mirena   Lifestyle     Physical activity:     Days per week: Not on file     Minutes per session: Not on file     Stress: Not on file   Relationships     Social connections:     Talks on phone: Not on file     Gets together: Not on file     Attends Sikh service: Not on file     Active member of club or organization: Not on file     Attends meetings of clubs or organizations: Not on file     Relationship status: Not on file     Intimate partner violence:     Fear of current or ex partner: Not on file     Emotionally abused: Not on file     Physically abused: Not on file     Forced sexual activity: Not on file   Other Topics Concern     Parent/sibling w/ CABG, MI or angioplasty before 65F 55M? Not Asked   Social History Narrative    March 16, 2017    ENVIRONMENTAL HISTORY: The family [place] a [place]. The home is heated with a gas furnace. They do have central air conditioning. The patient's bedroom is furnished with feather/wool bedding or pillows, carpeting in bedroom and allergen mattress cover.  Pets inside the house include 1 dog(s). There is not history of cockroach or mice infestation. There are no smokers in the house.  The house does not have a damp basement.          Patient's past medical, surgical, social, and family histories were reviewed today and no changes are noted.    REVIEW OF SYSTEMS:  10 point ROS is negative other than symptoms noted above in HPI, Past Medical History or as stated  below  Constitutional: NEGATIVE for fever, chills, change in weight  Skin: NEGATIVE for worrisome rashes, moles or lesions  GI/: NEGATIVE for bowel or bladder changes  Neuro: NEGATIVE for weakness, dizziness or paresthesias    OBJECTIVE:  /74   Wt 64 kg (141 lb)   BMI 25.30 kg/m     General: healthy, alert and in no distress  HEENT: no scleral icterus or conjunctival erythema  Skin: no suspicious lesions or rash. No jaundice.  CV:  no pedal edema  Resp: normal respiratory effort without conversational dyspnea   Psych: normal mood and affect  Gait: normal steady gait with appropriate coordination and balance  Neuro: Normal light sensory exam of lower extremity  MSK:  RIGHT FOOT  Inspection:   Pes planus, Ecchymosis and mild swelling over dorsal metatarsals, small laceration over medial 1st MTP  Palpation:    Tender about the 1st, 4th, 5th MTP joints. Otherwise remainder of bony/ligamentous landmarks are non-tender.   Range of Motion:     Grossly intact and non-painful  Strength:    Grossly intact in all planes  Special Tests:    Positive: None    Negative: anterior drawer, heel strike, calcaneal squeeze, Tinel's (tarsal tunnel), Tinel's (webspace), MTP stress and Lisfranc joint tenderness    RIGHT ANKLE  Inspection:    No swelling or ecchymosis is observed  Palpation:  Nontender.  Range of Motion:     Plantarflexion full / dorsiflexion full / inversion full / eversion full  Strength:    full  Special Tests:    negative anterior drawer, negative talar tilt, negative valgus stress, negative forced external rotation/eversion, negative Silver sign, negative squeeze test. Able to perform heel raise and Unable to hop.    Independent visualization of the below image:  Recent Results (from the past 24 hour(s))   XR Foot Right G/E 3 Views    Narrative    RIGHT FOOT THREE OR MORE VIEWS   4/18/2019 6:06 PM     HISTORY: Injury of right foot, initial encounter.      Impression    IMPRESSION:   1. No evidence of acute  fracture. However, if there is continued  clinical suspicion for midfoot occult fracture, CT is recommended.  2. Mild dorsal degenerative spurring is noted in the first metatarsal  head and at the talonavicular joint. Joint space width is relatively  well preserved.  3. Flat foot deformity is noted.  4. There may be a great toe distal phalangeal medial subungual  exostosis.          Patient's conditions were thoroughly discussed during today's visit with greater than 50% of the visit spent counseling the patient with total time spent face-to-face with the patient being 30 minutes.    Edward Marvin MD, [facility]

## 2019-04-18 NOTE — PATIENT INSTRUCTIONS
1) No fracture on repeat XR  2) Possible plantar plate injury  3) Wear stiff soled shoe for pain, supportive sandals at home  4) Follow-up in 3 weeks    It was great seeing you today!    Edward Marvin

## 2019-05-02 ENCOUNTER — OFFICE VISIT (OUTPATIENT)
Dept: FAMILY MEDICINE | Facility: CLINIC | Age: 39
End: 2019-05-02
Payer: COMMERCIAL

## 2019-05-02 VITALS
RESPIRATION RATE: 16 BRPM | WEIGHT: 141 LBS | TEMPERATURE: 98.1 F | OXYGEN SATURATION: 100 % | HEART RATE: 84 BPM | HEIGHT: 63 IN | SYSTOLIC BLOOD PRESSURE: 119 MMHG | BODY MASS INDEX: 24.98 KG/M2 | DIASTOLIC BLOOD PRESSURE: 74 MMHG

## 2019-05-02 DIAGNOSIS — Z09 FOLLOW-UP EXAM: ICD-10-CM

## 2019-05-02 DIAGNOSIS — L40.9 PSORIASIS: Primary | ICD-10-CM

## 2019-05-02 LAB
KOH PREP SPEC: NORMAL
KOH PREP SPEC: NORMAL
SPECIMEN SOURCE: NORMAL
SPECIMEN SOURCE: NORMAL

## 2019-05-02 PROCEDURE — 99214 OFFICE O/P EST MOD 30 MIN: CPT | Performed by: NURSE PRACTITIONER

## 2019-05-02 PROCEDURE — 87220 TISSUE EXAM FOR FUNGI: CPT | Performed by: NURSE PRACTITIONER

## 2019-05-02 PROCEDURE — 87210 SMEAR WET MOUNT SALINE/INK: CPT | Performed by: NURSE PRACTITIONER

## 2019-05-02 RX ORDER — HYDROCORTISONE AND ACETIC ACID 20.75; 10.375 MG/ML; MG/ML
5 SOLUTION AURICULAR (OTIC) 4 TIMES DAILY
Qty: 5 ML | Refills: 0 | Status: SHIPPED | OUTPATIENT
Start: 2019-05-02 | End: 2019-05-07

## 2019-05-02 RX ORDER — PIMECROLIMUS 10 MG/G
CREAM TOPICAL 2 TIMES DAILY
Qty: 100 G | Refills: 3 | Status: SHIPPED | OUTPATIENT
Start: 2019-05-02 | End: 2019-12-10 | Stop reason: ALTCHOICE

## 2019-05-02 ASSESSMENT — MIFFLIN-ST. JEOR: SCORE: 1278.7

## 2019-05-02 NOTE — PATIENT INSTRUCTIONS
Try topical treatments as we discussed.  Read below information.  If your symptoms persist or worsen, let me know if you would like to try oral steroid taper.  Schedule with dermatology to discuss alternative treatments.  Follow-up in clinic as needed.  We will let you know lab results.     Patient Education     Managing Psoriasis     Take baths in warm water to help soften scales.   The success of your medical treatment depends on you. When your healthcare provider gives you a treatment plan, ask when you should expect to see results. Then, follow your plan. If your treatment does not work in the expected time, let your healthcare provider know. Psoriasis is a common disease, and it can respond to many different treatments. It depends on the location, size, and symptoms each person experiences. Some treatments are simple (tar-based therapies or topical steroids). Other treatments are complex (new biologic medicines or light therapy). Your healthcare provider will need to personalize your treatment. Psoriasis will often get better with treatment. But it can get worse later if you stop treatment or if a new illness occurs. In most cases, you can get control of your psoriasis again. You will likely need to see your healthcare provider regularly about treatment options.  Psoriasis self-care  Follow these steps to help manage your symptoms:    Take baths to help soften scales. Use warm water, not hot water. To avoid drying out your skin, limit each bath to about 15 minutes. Add bath oil or [place] salts.    After you bathe, apply lotion right away, while your skin is damp. Dry skin can make symptoms worse.    Use a scalp treatment as prescribed by your healthcare provider. There are different solutions and dosages based on your symptoms.     Seek treatment right away for any illnesses or skin injuries because they can cause flare-ups.    Manage your stress, and use relaxation techniques.    Expose your psoriatic skin  to sunlight for 5 minutes a day. But don t do this if you feel that sun exposure makes your psoriasis worse. Use sunscreen on the normal, unaffected skin, but avoid sunburns.    Use over-the-counter hydrocortisone cream for itching to reduce scaling for active outbreaks. Ask your healthcare provider about long-term use.    Stick with treatment that your healthcare provider has recommended for you, especially if it's controlling your psoriasis.    Avoid abrasive cleansers, harsh detergents, and household chemicals.  Getting good results  Now that you know more about psoriasis, the next step is up to you. Follow your healthcare provider's treatment plan and self-care routine. Doing so can help you control your symptoms. If your symptoms don t improve or they get worse, call your healthcare provider. Psoriasis can t be cured. But its symptoms can be managed.   Date Last Reviewed: 2/1/2017 2000-2018 The [employer]. [address] [place] [address]. All rights reserved. This information is not intended as a substitute for professional medical care. Always follow your healthcare professional's instructions.           Patient Education     What Is Psoriasis?  Psoriasis is a chronic skin disease. Researchers believe this condition develops due to a combination of immune, genetic, and environmental factors. Psoriasis can start at any age. It is most common between ages 30 and 39 and also between ages 50 and 69. Psoriasis affects nearly equal numbers of men and women. In people with this disease, the skin grows too fast. Dead skin cells build up on the skin s surface to form inflamed, thick, silvery scales called plaques. Sometimes people form many small lesions that can hurt or have pus in them. Psoriasis does not spread from person to person.  About your symptoms  Psoriasis plaques tend to form on the elbows, knees, scalp, navel, arms, legs, and the penis or vulva. They can be unsightly, painful, and  itchy. Plaques on the joints can limit movement. On the fingernails or toenails, psoriasis can cause pitting, a change in nail color, and a change in nail shape. In some cases, psoriasis also causes arthritis. Symptoms may come and go on their own. Factors such as stress, infection, and certain medicines may cause flare-ups. If symptoms bother you, many effective medical treatments are available to help relieve symptoms. Discuss your treatment options with your healthcare provider.  External medical treatments  There are many types of external medical treatments that are used to treat the affected skin lesions. Your healthcare provider may prescribe one of many types of topical medicines. These include strong topical steroids or steroid-sparing agents. You apply them to your skin on a regular basis. Coal tar (a thick black liquid) may be applied to thick plaques. In some cases, the skin may be exposed to a special light in the healthcare provider's office. Or, you can expose the psoriatic plaques to short periods (5 minutes) of natural sun as directed by your healthcare provider. This method is called phototherapy. It can be enhanced with the use of psoralen (a type of medicine).  Internal medical treatments  Internal treatments are taken orally (by mouth) or given by injection. There are a number of new oral and injectable medicines for more severe psoriasis. Your healthcare provider can tell you more about these treatments.  Date Last Reviewed: 5/1/2017 2000-2018 [employer]. [address] [place] [address], [place] [address]. All rights reserved. This information is not intended as a substitute for professional medical care. Always follow your healthcare professional's instructions.

## 2019-05-02 NOTE — PROGRESS NOTES
SUBJECTIVE:   Monet You is a 39 year old female who presents to clinic today for the following   health issues:    Derm Issue    Duration: December of 2016    Description  Location: inside ears, scalp, all over  Itching: mild    Accompanying signs and symptoms: bumps that look like mosquito bites    Therapies tried and outcome: has tried multiple things-topical, oral, homeopathic, changing skin care products, diet changes- it has improved but not gone. No one else in family with similar symptoms. She would like to see if we can do KOH to see what is going on, as work-ups in past have not revealed dx.       Additional history: as documented    Reviewed  and updated as needed this visit by clinical staff  Tobacco  Allergies  Meds  Problems  Med Hx  Surg Hx  Fam Hx  Soc Hx          Reviewed and updated as needed this visit by Provider  Tobacco  Allergies  Meds  Problems  Med Hx  Surg Hx  Fam Hx         Patient Active Problem List   Diagnosis     Mirena IUD (intrauterine device) in place     Psoriasis     Past Surgical History:   Procedure Laterality Date     D & C  5/06    miscarriage       Social History     Tobacco Use     Smoking status: Never Smoker     Smokeless tobacco: Never Used   Substance Use Topics     Alcohol use: Yes     Alcohol/week: 0.0 oz     Family History   Problem Relation Age of Onset     Eye Disorder Mother         glaucoma     Hypertension Father      Diabetes Father         Type 2     Breast Cancer Maternal Grandmother 65     Hypertension Maternal Grandmother      Hypertension Paternal Grandmother      Thyroid Disease Paternal Grandmother      Breast Cancer Paternal Grandmother         Henrietta     Abdominal Aortic Aneurysm Paternal Grandfather      Hypertension Paternal Grandfather      Cerebrovascular Disease No family hx of      Cancer - colorectal No family hx of          Current Outpatient Medications   Medication Sig Dispense Refill     acetic acid-hydrocortisone  "(VOSOL-HC) 1-2 % otic solution Place 5 drops into both ears 4 times daily for 5 days 5 mL 0     Coal Tar Extract 2 % SHAM Externally apply topically daily as needed (dry itchy scalp) 236 mL 3     Cyanocobalamin (VITAMIN B 12 PO)        levonorgestrel (MIRENA) 20 MCG/24HR IUD 1 each by Intrauterine route once.       Multiple Vitamin (MULTIVITAMIN OR) Take  by mouth daily.       order for DME Equipment being ordered: medium post op shoe       pimecrolimus (ELIDEL) 1 % external cream Apply topically 2 times daily 100 g 3     Allergies   Allergen Reactions     Ceclor [Cefaclor] Hives     Parabens Itching     Reglan [Metoclopramide Hcl]      restless     Recent Labs   Lab Test 03/16/17  1055 05/26/16  0849 02/16/11  1336   LDL  --  94  --    HDL  --  45*  --    TRIG  --  72  --    ALT 23  --  14   CR 0.68  --  0.70   GFRESTIMATED >90  Non  GFR Calc    --  >90   GFRESTBLACK >90   GFR Calc    --  >90   POTASSIUM 3.9  --   --    TSH 2.97  --   --       BP Readings from Last 3 Encounters:   05/02/19 119/74   04/18/19 116/74   04/10/19 128/85    Wt Readings from Last 3 Encounters:   05/02/19 64 kg (141 lb)   04/18/19 64 kg (141 lb)   02/05/19 62.6 kg (138 lb)                  Labs reviewed in EPIC    ROS:  Constitutional, HEENT, cardiovascular, pulmonary, GI, , musculoskeletal, neuro, skin, endocrine and psych systems are negative, except as otherwise noted.    OBJECTIVE:     /74   Pulse 84   Temp 98.1  F (36.7  C) (Oral)   Resp 16   Ht 1.6 m (5' 3\")   Wt 64 kg (141 lb)   LMP  (Approximate)   SpO2 100%   Breastfeeding? No   BMI 24.98 kg/m    Body mass index is 24.98 kg/m .  GENERAL: healthy, alert and no distress  HENT: POSITIVE for dry flaking skin to outer ear canals  NECK: no adenopathy, thyroid normal to palpation  RESP: lungs clear to auscultation - no rales, rhonchi or wheezes  CV: regular rate and rhythm, normal S1 S2, no S3 or S4, no murmur, click or rub, no peripheral " edema and peripheral pulses strong  SKIN: POSITIVE for dry flaking skin in ears and on scalp.  KOH skin scrape obtained from ear and scalp per pt request; appears like psoriasis   PSYCH: mentation appears normal, affect normal/bright    Diagnostic Test Results:  See orders- KOH normal    ASSESSMENT/PLAN:         ICD-10-CM    1. Psoriasis L40.9 pimecrolimus (ELIDEL) 1 % external cream     acetic acid-hydrocortisone (VOSOL-HC) 1-2 % otic solution     Coal Tar Extract 2 % SHAM     DERMATOLOGY REFERRAL     KOH prep (Other than skin, nails, hair)     KOH prep (skin, hair or nails only)   2. Follow-up exam Z09        See Patient Instructions: patient does not want to try oral steroids. Try topical treatments as we discussed.  Read below information.  If your symptoms persist or worsen, let me know if you would like to try oral steroid taper.  Schedule with dermatology to discuss alternative treatments.  Follow-up in clinic as needed.  We will let you know lab results.     Fartun Reid, ELIDA  [facility]

## 2019-05-03 PROBLEM — L40.9 PSORIASIS: Status: ACTIVE | Noted: 2019-05-03

## 2019-05-03 NOTE — RESULT ENCOUNTER NOTE
Edgar Healy,    Thank you for your recent office visit.    Here are your recent results.  Normal KOH testing, I presume due to the fact that it appears you have psoriasis.  If the treatments do not work for you and you would like a referral, please let me know.    Feel free to contact me via [facility] or call the clinic at [phone].    Sincerely,    HIRA Tariq, FNP-BC

## 2019-07-16 ENCOUNTER — OFFICE VISIT (OUTPATIENT)
Dept: FAMILY MEDICINE | Facility: CLINIC | Age: 39
End: 2019-07-16
Payer: COMMERCIAL

## 2019-07-16 VITALS
HEIGHT: 63 IN | TEMPERATURE: 97.7 F | BODY MASS INDEX: 25.34 KG/M2 | WEIGHT: 143 LBS | OXYGEN SATURATION: 100 % | RESPIRATION RATE: 16 BRPM | SYSTOLIC BLOOD PRESSURE: 110 MMHG | HEART RATE: 71 BPM | DIASTOLIC BLOOD PRESSURE: 70 MMHG

## 2019-07-16 DIAGNOSIS — Z12.4 SCREENING FOR MALIGNANT NEOPLASM OF CERVIX: ICD-10-CM

## 2019-07-16 DIAGNOSIS — H60.503 ACUTE OTITIS EXTERNA OF BOTH EARS, UNSPECIFIED TYPE: ICD-10-CM

## 2019-07-16 PROCEDURE — 99213 OFFICE O/P EST LOW 20 MIN: CPT | Performed by: FAMILY MEDICINE

## 2019-07-16 RX ORDER — CIPROFLOXACIN AND DEXAMETHASONE 3; 1 MG/ML; MG/ML
4 SUSPENSION/ DROPS AURICULAR (OTIC) 2 TIMES DAILY
Qty: 1 BOTTLE | Refills: 0 | Status: SHIPPED | OUTPATIENT
Start: 2019-07-16 | End: 2019-12-10 | Stop reason: ALTCHOICE

## 2019-07-16 ASSESSMENT — MIFFLIN-ST. JEOR: SCORE: 1287.77

## 2019-07-16 NOTE — PATIENT INSTRUCTIONS
Patient Education     External Ear Infection (Adult)    External otitis (also called  swimmer s ear ) is an infection in the ear canal. It is often caused by bacteria or fungus. It can occur a few days after water gets trapped in the ear canal (from swimming or bathing). It can also occur after cleaning too deeply in the ear canal with a cotton swab or other object. Sometimes, hair care products get into the ear canal and cause this problem.  Symptoms can include pain, fever, itching, redness, drainage, or swelling of the ear canal. Temporary hearing loss may also occur.  Home care    Do not try to clean the ear canal. This can push pus and bacteria deeper into the canal.    Use prescribed ear drops as directed. These help reduce swelling and fight the infection. If an ear wick was placed in the ear canal, apply drops right onto the end of the wick. The wick will draw the medicine into the ear canal even if it is swollen closed.    A cotton ball may be loosely placed in the outer ear to absorb any drainage.    You may use acetaminophen or ibuprofen to control pain, unless another medicine was prescribed. Note: If you have chronic liver or kidney disease or ever had a stomach ulcer or GI bleeding, talk to your healthcare provider before taking any of these medicines.    Do not allow water to get into your ear when bathing. Also, don't swim until the infection has cleared.  Prevention    Keep your ears dry. This helps lower the risk of infection. Dry your ears with a towel or hair dryer after getting wet. Also, use ear plugs when swimming.    Do not stick any objects in the ear to remove wax.    If you feel water trapped in your ear, use ear drops right away. You can get these drops over the counter at most drugstores. They work by removing water from the ear canal.  Follow-up care  Follow up with your healthcare provider in 1 week, or as advised.  When to seek medical advice  Call your healthcare provider right  away if any of these occur:    Ear pain becomes worse or doesn t improve after 3 days of treatment    Redness or swelling of the outer ear occurs or gets worse    Headache    Painful or stiff neck    Drowsiness or confusion    Fever of 100.4 F (38 C) or higher, or as directed by your healthcare provider    Seizure  Date Last Reviewed: 10/1/2017    9620-1137 [employer]. [address] [place] [address]. All rights reserved. This information is not intended as a substitute for professional medical care. Always follow your healthcare professional's instructions.

## 2019-07-16 NOTE — PROGRESS NOTES
Subjective     Monet You is a 39 year old female who presents to clinic today for the following health issues:    HPI   Acute Illness   Acute illness concerns: Ear pain x 4 days  Onset: x 4 days    Fever: no     Chills/Sweats: No    Headache (location?): No    Sinus Pressure:no    Conjunctivitis:  no    Ear Pain: YES: both    Rhinorrhea: no     Congestion: no     Sore Throat: no      Cough: no    Wheeze: no    Decreased Appetite: YES    Nausea: YES    Vomiting: no     Diarrhea:  no     Dysuria/Freq.: no     Fatigue/Achiness: No    Sick/Strep Exposure: no      Therapies Tried and outcome: Tylenol and Ibuprofen  Gets This every Year    Patient Active Problem List   Diagnosis     Mirena IUD (intrauterine device) in place     Psoriasis     Past Surgical History:   Procedure Laterality Date     D & C  5/06    miscarriage       Social History     Tobacco Use     Smoking status: Never Smoker     Smokeless tobacco: Never Used   Substance Use Topics     Alcohol use: Yes     Alcohol/week: 0.0 oz     Family History   Problem Relation Age of Onset     Eye Disorder Mother         glaucoma     Hypertension Father      Diabetes Father         Type 2     Breast Cancer Maternal Grandmother 65     Hypertension Maternal Grandmother      Hypertension Paternal Grandmother      Thyroid Disease Paternal Grandmother      Breast Cancer Paternal Grandmother         Ronni     Abdominal Aortic Aneurysm Paternal Grandfather      Hypertension Paternal Grandfather      Cerebrovascular Disease No family hx of      Cancer - colorectal No family hx of          Current Outpatient Medications   Medication Sig Dispense Refill     ciprofloxacin-dexamethasone (CIPRODEX) 0.3-0.1 % otic suspension Place 4 drops into both ears 2 times daily 1 Bottle 0     Cyanocobalamin (VITAMIN B 12 PO)        levonorgestrel (MIRENA) 20 MCG/24HR IUD 1 each by Intrauterine route once.       Multiple Vitamin (MULTIVITAMIN OR) Take  by mouth daily.       Coal Tar  "Extract 2 % SHAM Externally apply topically daily as needed (dry itchy scalp) (Patient not taking: Reported on 7/16/2019) 236 mL 3     order for DME Equipment being ordered: medium post op shoe (Patient not taking: Reported on 7/16/2019)       pimecrolimus (ELIDEL) 1 % external cream Apply topically 2 times daily (Patient not taking: Reported on 7/16/2019) 100 g 3     Allergies   Allergen Reactions     Ceclor [Cefaclor] Hives     Parabens Itching     Reglan [Metoclopramide Hcl]      restless     Recent Labs   Lab Test 03/16/17  1055 05/26/16  0849   LDL  --  94   HDL  --  45*   TRIG  --  72   ALT 23  --    CR 0.68  --    GFRESTIMATED >90  Non  GFR Calc    --    GFRESTBLACK >90   GFR Calc    --    POTASSIUM 3.9  --    TSH 2.97  --       BP Readings from Last 3 Encounters:   07/16/19 110/70   05/02/19 119/74   04/18/19 116/74    Wt Readings from Last 3 Encounters:   07/16/19 64.9 kg (143 lb)   05/02/19 64 kg (141 lb)   04/18/19 64 kg (141 lb)                      Reviewed and updated as needed this visit by Provider  Tobacco  Allergies  Meds  Problems  Med Hx  Surg Hx  Fam Hx         Review of Systems   ROS COMP: Rest of the ROS is Negative except see above and Problem list [stable]        Objective    /70   Pulse 71   Temp 97.7  F (36.5  C) (Oral)   Resp 16   Ht 1.6 m (5' 3\")   Wt 64.9 kg (143 lb)   SpO2 100%   Breastfeeding? No   BMI 25.33 kg/m    Body mass index is 25.33 kg/m .  Physical Exam   GENERAL: healthy, alert and no distress  HENT: normal cephalic/atraumatic, right ear: erythematous, left ear: erythematous, nose and mouth without ulcers or lesions, oropharynx clear and oral mucous membranes moist    Diagnostic Test Results:  Labs reviewed in Epic        Assessment & Plan     1. Acute otitis externa of both ears, unspecified type  SEE EPIC care orders  The potential side effects of this medication have been discussed with the patient.  Call if any " significant problems with these are experienced.  Follow up 1 week if not better/sooner if worse   - ciprofloxacin-dexamethasone (CIPRODEX) 0.3-0.1 % otic suspension; Place 4 drops into both ears 2 times daily  Dispense: 1 Bottle; Refill: 0    2. Screening for malignant neoplasm of cervix  Advised schedule pap with PMD          Return in about 1 week (around 7/23/2019) for Physical Exam.    Daniela Dunham MD  [facility]

## 2019-07-22 ENCOUNTER — OFFICE VISIT (OUTPATIENT)
Dept: FAMILY MEDICINE | Facility: CLINIC | Age: 39
End: 2019-07-22
Payer: COMMERCIAL

## 2019-07-22 VITALS
TEMPERATURE: 98.8 F | WEIGHT: 144.3 LBS | BODY MASS INDEX: 25.56 KG/M2 | SYSTOLIC BLOOD PRESSURE: 110 MMHG | OXYGEN SATURATION: 99 % | HEART RATE: 67 BPM | DIASTOLIC BLOOD PRESSURE: 72 MMHG

## 2019-07-22 DIAGNOSIS — L23.9 ALLERGIC CONTACT DERMATITIS, UNSPECIFIED TRIGGER: ICD-10-CM

## 2019-07-22 DIAGNOSIS — H60.391 INFECTIVE OTITIS EXTERNA, RIGHT: Primary | ICD-10-CM

## 2019-07-22 PROCEDURE — 99213 OFFICE O/P EST LOW 20 MIN: CPT | Performed by: FAMILY MEDICINE

## 2019-07-22 NOTE — PROGRESS NOTES
Subjective     Monet You is a 39 year old female who presents to clinic today for the following health issues:    HPI   Ear Issue      Duration: last week Sunday     Description (location/character/radiation): both ears are clogged bu the tight is the worst, can not hear from that side, was seen at the [facility] clinic.     Intensity:  No pain at the moment     Accompanying signs and symptoms: hearing loss     History (similar episodes/previous evaluation): Yes, but does not last this long when she uses the drops.     Precipitating or alleviating factors: None    Therapies tried and outcome: ear drops     Reports that her inner ears have felt itchy for the last couple years. Patient has not been able to hear out of her right ear for approximately one week, and it has been painful at times. She has been using Ciprodex ear drops after being seen on 7/16 which have been helpful. States that she is prone to developing swimmer's ear, has tried using ear plugs when she swims. Patient notes that objects that go in her ear like ear buds can be irritating. She has seen ENT in the past for ear infections, states that she has developed an ear infection in late summer for the last 4 years.    Patient Active Problem List   Diagnosis     Mirena IUD (intrauterine device) in place     Psoriasis     Past Surgical History:   Procedure Laterality Date     D & C  5/06    miscarriage       Social History     Tobacco Use     Smoking status: Never Smoker     Smokeless tobacco: Never Used   Substance Use Topics     Alcohol use: Yes     Alcohol/week: 0.0 oz     Family History   Problem Relation Age of Onset     Eye Disorder Mother         glaucoma     Hypertension Father      Diabetes Father         Type 2     Breast Cancer Maternal Grandmother 65     Hypertension Maternal Grandmother      Hypertension Paternal Grandmother      Thyroid Disease Paternal Grandmother      Breast Cancer Paternal Grandmother         Ronni      Abdominal Aortic Aneurysm Paternal Grandfather      Hypertension Paternal Grandfather      Cerebrovascular Disease No family hx of      Cancer - colorectal No family hx of          Current Outpatient Medications   Medication Sig Dispense Refill     ciprofloxacin-dexamethasone (CIPRODEX) 0.3-0.1 % otic suspension Place 4 drops into both ears 2 times daily 1 Bottle 0     Cyanocobalamin (VITAMIN B 12 PO)        levonorgestrel (MIRENA) 20 MCG/24HR IUD 1 each by Intrauterine route once.       Multiple Vitamin (MULTIVITAMIN OR) Take  by mouth daily.       Coal Tar Extract 2 % SHAM Externally apply topically daily as needed (dry itchy scalp) (Patient not taking: Reported on 7/16/2019) 236 mL 3     order for DME Equipment being ordered: medium post op shoe (Patient not taking: Reported on 7/16/2019)       pimecrolimus (ELIDEL) 1 % external cream Apply topically 2 times daily (Patient not taking: Reported on 7/16/2019) 100 g 3     Allergies   Allergen Reactions     Ceclor [Cefaclor] Hives     Parabens Itching     Reglan [Metoclopramide Hcl]      restless     BP Readings from Last 3 Encounters:   07/22/19 110/72   07/16/19 110/70   05/02/19 119/74    Wt Readings from Last 3 Encounters:   07/22/19 65.5 kg (144 lb 4.8 oz)   07/16/19 64.9 kg (143 lb)   05/02/19 64 kg (141 lb)         Reviewed and updated as needed this visit by Provider         Review of Systems   POSITIVE ear problems    Denies headache, insomnia, chest pain, shortness of breath, cough, heartburn, bowel issues, bladder issues, neck pain, back pain, hip pain, knee pain, ankle pain, or foot pain. Remainder of ROS is negative unless otherwise noted above or in HPI.    This document serves as a record of the services and decisions personally performed and made by Kenyon Sosa MD. It was created on his behalf by Esequiel Rob, trained medical scribe. The creation of this document is based on the provider's statements to the medical scribe.  Esequiel Rob 10:18 AM  July 22, 2019        Objective    /72   Pulse 67   Temp 98.8  F (37.1  C) (Oral)   Wt 65.5 kg (144 lb 4.8 oz)   SpO2 99%   BMI 25.56 kg/m    Body mass index is 25.56 kg/m .  Physical Exam   GENERAL: healthy, alert and no distress  HENT: ear canals with slight erythema, nose and mouth without ulcers or lesions  MS: no gross musculoskeletal defects noted, no edema  SKIN: no suspicious lesions or rashes  NEURO: Normal strength and tone, mentation intact and speech normal  PSYCH: mentation appears normal, affect normal/bright    Diagnostic Test Results:  Labs reviewed in Epic  none         Assessment & Plan   (H60.391) Infective otitis externa, right  (primary encounter diagnosis)  Comment: Referred to ENT.  Plan: OTOLARYNGOLOGY REFERRAL        Advised patient continue with ear drops, follow up with referral.    (L23.9) Allergic contact dermatitis, unspecified trigger  Comment: Referred to Derm.  Plan: DERMATOLOGY REFERRAL        Follow up with referral.      Patient Instructions   Continue with ear drops, follow up with ENT and Derm referrals.       The information in this document, created by the medical scribe for me, accurately reflects the services I personally performed and the decisions made by me. I have reviewed and approved this document for accuracy prior to leaving the patient care area.  July 22, 2019 10:19 AM    Kenyon Sosa MD  [facility]

## 2019-07-30 ENCOUNTER — TRANSFERRED RECORDS (OUTPATIENT)
Dept: HEALTH INFORMATION MANAGEMENT | Facility: CLINIC | Age: 39
End: 2019-07-30

## 2019-08-20 ENCOUNTER — TRANSFERRED RECORDS (OUTPATIENT)
Dept: HEALTH INFORMATION MANAGEMENT | Facility: CLINIC | Age: 39
End: 2019-08-20

## 2019-09-29 ENCOUNTER — HEALTH MAINTENANCE LETTER (OUTPATIENT)
Age: 39
End: 2019-09-29

## 2019-12-10 ENCOUNTER — OFFICE VISIT (OUTPATIENT)
Dept: FAMILY MEDICINE | Facility: CLINIC | Age: 39
End: 2019-12-10
Payer: COMMERCIAL

## 2019-12-10 VITALS
HEIGHT: 63 IN | OXYGEN SATURATION: 99 % | HEART RATE: 77 BPM | SYSTOLIC BLOOD PRESSURE: 130 MMHG | DIASTOLIC BLOOD PRESSURE: 74 MMHG | RESPIRATION RATE: 16 BRPM | WEIGHT: 141.38 LBS | BODY MASS INDEX: 25.05 KG/M2 | TEMPERATURE: 98.2 F

## 2019-12-10 DIAGNOSIS — N89.8 VAGINAL DISCHARGE: ICD-10-CM

## 2019-12-10 DIAGNOSIS — R82.90 MALODOROUS URINE: ICD-10-CM

## 2019-12-10 DIAGNOSIS — B37.31 CANDIDIASIS OF VAGINA: ICD-10-CM

## 2019-12-10 DIAGNOSIS — Z00.00 ENCOUNTER FOR ROUTINE ADULT HEALTH EXAMINATION WITHOUT ABNORMAL FINDINGS: Primary | ICD-10-CM

## 2019-12-10 LAB
ALBUMIN UR-MCNC: NEGATIVE MG/DL
APPEARANCE UR: CLEAR
BILIRUB UR QL STRIP: NEGATIVE
COLOR UR AUTO: YELLOW
GLUCOSE UR STRIP-MCNC: NEGATIVE MG/DL
HGB UR QL STRIP: NEGATIVE
KETONES UR STRIP-MCNC: NEGATIVE MG/DL
LEUKOCYTE ESTERASE UR QL STRIP: NEGATIVE
NITRATE UR QL: NEGATIVE
PH UR STRIP: 6.5 PH (ref 5–7)
SOURCE: NORMAL
SP GR UR STRIP: 1.01 (ref 1–1.03)
SPECIMEN SOURCE: ABNORMAL
UROBILINOGEN UR STRIP-ACNC: 0.2 EU/DL (ref 0.2–1)
WET PREP SPEC: ABNORMAL

## 2019-12-10 PROCEDURE — 99396 PREV VISIT EST AGE 40-64: CPT | Performed by: PHYSICIAN ASSISTANT

## 2019-12-10 PROCEDURE — 87210 SMEAR WET MOUNT SALINE/INK: CPT | Performed by: PHYSICIAN ASSISTANT

## 2019-12-10 PROCEDURE — 99213 OFFICE O/P EST LOW 20 MIN: CPT | Mod: 25 | Performed by: PHYSICIAN ASSISTANT

## 2019-12-10 PROCEDURE — 81003 URINALYSIS AUTO W/O SCOPE: CPT | Performed by: PHYSICIAN ASSISTANT

## 2019-12-10 RX ORDER — FLUCONAZOLE 150 MG/1
150 TABLET ORAL ONCE
Qty: 1 TABLET | Refills: 0 | Status: SHIPPED | OUTPATIENT
Start: 2019-12-10 | End: 2020-05-21

## 2019-12-10 ASSESSMENT — MIFFLIN-ST. JEOR: SCORE: 1280.4

## 2019-12-10 NOTE — PROGRESS NOTES
SUBJECTIVE:   CC: Monet You is an 39 year old woman who presents for preventive health visit.     Healthy Habits:    Do you get at least three servings of calcium containing foods daily (dairy, green leafy vegetables, etc.)? yes    Amount of exercise or daily activities, outside of work: 4 day(s) per week    Problems taking medications regularly No    Medication side effects: No    Have you had an eye exam in the past two years? yes    Do you see a dentist twice per year? yes    Do you have sleep apnea, excessive snoring or daytime drowsiness?no      Other Concerns:  Pt has concern with vaginal discharge and odor of urine  x1 month   More discharge, different odor   No dysuria, hematuria, incomplete emptying       Today's PHQ-2 Score:   PHQ-2 ( 1999 [employer]) 12/10/2019 7/22/2019   Q1: Little interest or pleasure in doing things 0 0   Q2: Feeling down, depressed or hopeless 0 0   PHQ-2 Score 0 0       Abuse: Current or Past(Physical, Sexual or Emotional)- No  Do you feel safe in your environment? Yes        Social History     Tobacco Use     Smoking status: Never Smoker     Smokeless tobacco: Never Used   Substance Use Topics     Alcohol use: Yes     Alcohol/week: 0.0 standard drinks     If you drink alcohol do you typically have >3 drinks per day or >7 drinks per week? No                     Reviewed orders with patient.  Reviewed health maintenance and updated orders accordingly - Yes  Labs reviewed in [facility]    Mammogram Screening: Patient under age 50, mutual decision reflected in health maintenance.      Pertinent mammograms are reviewed under the imaging tab.  History of abnormal Pap smear: NO - age 30-65 PAP every 5 years with negative HPV co-testing recommended  PAP / HPV Latest Ref Rng & Units 6/27/2016 8/14/2013 8/10/2010   PAP - NIL NIL NIL   HPV 16 DNA NEG Negative - -   HPV 18 DNA NEG Negative - -   OTHER HR HPV NEG Negative - -   HPVSUR RESULT - - Negative  Reference range:  Negative  (Note)  INTERPRETIVE INFORMATION: HPV High Risk, Hybrid Capture,  SurePath    The performance characteristics of HPV testing using the  SurePath sample medium were determined by [facility]  in a validation study. The FDA has not approved the  SurePath sample medium for HPV testing. Specimens collected  in SurePath sample medium may produce false-negative  results under certain conditions, e.g., when specimens  exceed stability requirements. For HPV results using an  FDA-approved test, laboratories should collect and  transport specimens according to the instructions of  FDA-approved kits (e.g., ThinPrep medium or HPV Digene  collection kit).    This test detects the high-risk HPV genotypes 16, 18, 31,  33, 35, 39, 45, 51, 52, 56, 58, 59, and 68 associated with  cervical cancer and its precursor lesions. However,  cross-reactions with other genotypes may occur. Results  should be correlated with cytologic and histologic  findings. Sensitivity may be affected by cellularity of  specimen.    This test is intended for medical purposes only and is not  valid for the evaluation of suspected sexual abuse or for  other forensic purposes.    HPV testing should not be used for screening or management  of atypical squamous cells of undetermined significance  (ASCUS) in women under age 21.    Test developed and characteristics determined by [facility]. See Compliance Statement B: [employer]/CS  Performed by [facility],  [address] [place] [id number] [phone]  www.[employer], Behzad Orona MD, Lab. Director -     Reviewed and updated as needed this visit by clinical staff  Tobacco  Allergies  Meds  Soc Hx        Reviewed and updated as needed this visit by Provider        Past Medical History:   Diagnosis Date     Encounter for insertion of mirena IUD 06/27/2016     Menarche age    cycles q     X    d        ROS:  Other than what is noted in the HPI and PMH a complete review of  "systems is otherwise negative including: Constitutional, HEENT, endocrine, cardiovascular, respiratory, GI/, musculoskeletal, neuro, and psychiatric.     OBJECTIVE:   /74   Pulse 77   Temp 98.2  F (36.8  C) (Oral)   Resp 16   Ht 1.6 m (5' 3\")   Wt 64.1 kg (141 lb 6 oz)   LMP  (LMP Unknown)   SpO2 99%   Breastfeeding No   BMI 25.04 kg/m    EXAM:  GENERAL: healthy, alert and no distress  EYES: Eyes grossly normal to inspection, PERRL and conjunctivae and sclerae normal  HENT: ear canals and TM's normal, nose and mouth without ulcers or lesions  NECK: no adenopathy, no asymmetry, masses, or scars and thyroid normal to palpation  RESP: lungs clear to auscultation - no rales, rhonchi or wheezes  BREAST: normal without masses, tenderness or nipple discharge and no palpable axillary masses or adenopathy  CV: regular rate and rhythm, normal S1 S2, no S3 or S4, no murmur, click or rub, no peripheral edema and peripheral pulses strong  ABDOMEN: soft, nontender, no hepatosplenomegaly, no masses and bowel sounds normal  MS: no gross musculoskeletal defects noted, no edema  SKIN: no suspicious lesions or rashes  NEURO: Normal strength and tone, mentation intact and speech normal  PSYCH: mentation appears normal, affect normal/bright    Diagnostic Test Results:  Labs reviewed in Epic  Results for orders placed or performed in visit on 12/10/19 (from the past 24 hour(s))   *UA reflex to Microscopic and Culture ([place] [facility] (except [place] and [place])   Result Value Ref Range    Color Urine Yellow     Appearance Urine Clear     Glucose Urine Negative NEG^Negative mg/dL    Bilirubin Urine Negative NEG^Negative    Ketones Urine Negative NEG^Negative mg/dL    Specific Gravity Urine 1.015 1.003 - 1.035    Blood Urine Negative NEG^Negative    pH Urine 6.5 5.0 - 7.0 pH    Protein Albumin Urine Negative NEG^Negative mg/dL    Urobilinogen Urine 0.2 0.2 - 1.0 EU/dL    Nitrite Urine Negative NEG^Negative " "   Leukocyte Esterase Urine Negative NEG^Negative    Source Midstream Urine    Wet prep   Result Value Ref Range    Specimen Description Vagina     Wet Prep No Trichomonas seen     Wet Prep No clue cells seen     Wet Prep Few  Yeast seen   (A)     Wet Prep Rare  WBC'S seen          ASSESSMENT/PLAN:       ICD-10-CM    1. Encounter for routine adult health examination without abnormal findings Z00.00 *MA Screening Digital Bilateral   2. Malodorous urine R82.90 *UA reflex to Microscopic and Culture ([place] [facility] (except [place] and [place])   3. Vaginal discharge N89.8 Wet prep   4. Candidiasis of vagina B37.3 fluconazole (DIFLUCAN) 150 MG tablet       1) Screenings discussed    2) UA negative. Follow up if symptoms fail to improve or worsen.     3,4) Wet prep positive for yeast. Diflucan x1 dose.       COUNSELING:   Reviewed preventive health counseling, as reflected in patient instructions    Estimated body mass index is 25.04 kg/m  as calculated from the following:    Height as of this encounter: 1.6 m (5' 3\").    Weight as of this encounter: 64.1 kg (141 lb 6 oz).     reports that she has never smoked. She has never used smokeless tobacco.    Counseling Resources:  ATP IV Guidelines  Pooled Cohorts Equation Calculator  Breast Cancer Risk Calculator  FRAX Risk Assessment  ICSI Preventive Guidelines  Dietary Guidelines for Americans, 2010  USDA's MyPlate  ASA Prophylaxis  Lung CA Screening    Bianka Villaseñor PA-C  [facility] DANDY  "

## 2019-12-16 ENCOUNTER — ANCILLARY PROCEDURE (OUTPATIENT)
Dept: MAMMOGRAPHY | Facility: CLINIC | Age: 39
End: 2019-12-16
Attending: PHYSICIAN ASSISTANT
Payer: COMMERCIAL

## 2019-12-16 DIAGNOSIS — Z12.31 VISIT FOR SCREENING MAMMOGRAM: ICD-10-CM

## 2019-12-16 PROCEDURE — 77067 SCR MAMMO BI INCL CAD: CPT | Mod: TC

## 2020-03-03 ENCOUNTER — HOSPITAL ENCOUNTER (EMERGENCY)
Facility: CLINIC | Age: 40
Discharge: HOME | End: 2020-03-03
Attending: EMERGENCY MEDICINE | Admitting: EMERGENCY MEDICINE
Payer: COMMERCIAL

## 2020-03-03 ENCOUNTER — APPOINTMENT (OUTPATIENT)
Dept: GENERAL RADIOLOGY | Facility: CLINIC | Age: 40
End: 2020-03-03
Attending: EMERGENCY MEDICINE
Payer: COMMERCIAL

## 2020-03-03 VITALS
SYSTOLIC BLOOD PRESSURE: 120 MMHG | OXYGEN SATURATION: 100 % | RESPIRATION RATE: 16 BRPM | WEIGHT: 137 LBS | DIASTOLIC BLOOD PRESSURE: 80 MMHG | TEMPERATURE: 98.1 F | HEIGHT: 62 IN | HEART RATE: 79 BPM | BODY MASS INDEX: 25.21 KG/M2

## 2020-03-03 DIAGNOSIS — R07.9 ACUTE CHEST PAIN: ICD-10-CM

## 2020-03-03 LAB
ANION GAP SERPL CALCULATED.3IONS-SCNC: 4 MMOL/L (ref 3–14)
BASOPHILS # BLD AUTO: 0 10E9/L (ref 0–0.2)
BASOPHILS NFR BLD AUTO: 0.5 %
BUN SERPL-MCNC: 13 MG/DL (ref 7–30)
CALCIUM SERPL-MCNC: 8.8 MG/DL (ref 8.5–10.1)
CHLORIDE SERPL-SCNC: 112 MMOL/L (ref 94–109)
CO2 SERPL-SCNC: 25 MMOL/L (ref 20–32)
CREAT SERPL-MCNC: 0.71 MG/DL (ref 0.52–1.04)
DIFFERENTIAL METHOD BLD: NORMAL
EOSINOPHIL # BLD AUTO: 0.1 10E9/L (ref 0–0.7)
EOSINOPHIL NFR BLD AUTO: 1.1 %
ERYTHROCYTE [DISTWIDTH] IN BLOOD BY AUTOMATED COUNT: 11.9 % (ref 10–15)
GFR SERPL CREATININE-BSD FRML MDRD: >90 ML/MIN/{1.73_M2}
GLUCOSE SERPL-MCNC: 110 MG/DL (ref 70–99)
HCT VFR BLD AUTO: 42.3 % (ref 35–47)
HGB BLD-MCNC: 14.1 G/DL (ref 11.7–15.7)
IMM GRANULOCYTES # BLD: 0 10E9/L (ref 0–0.4)
IMM GRANULOCYTES NFR BLD: 0.4 %
INTERPRETATION ECG - MUSE: NORMAL
LYMPHOCYTES # BLD AUTO: 2.2 10E9/L (ref 0.8–5.3)
LYMPHOCYTES NFR BLD AUTO: 25.9 %
MCH RBC QN AUTO: 32 PG (ref 26.5–33)
MCHC RBC AUTO-ENTMCNC: 33.3 G/DL (ref 31.5–36.5)
MCV RBC AUTO: 96 FL (ref 78–100)
MONOCYTES # BLD AUTO: 0.5 10E9/L (ref 0–1.3)
MONOCYTES NFR BLD AUTO: 6.2 %
NEUTROPHILS # BLD AUTO: 5.7 10E9/L (ref 1.6–8.3)
NEUTROPHILS NFR BLD AUTO: 65.9 %
NRBC # BLD AUTO: 0 10*3/UL
NRBC BLD AUTO-RTO: 0 /100
PLATELET # BLD AUTO: 282 10E9/L (ref 150–450)
POTASSIUM SERPL-SCNC: 3.9 MMOL/L (ref 3.4–5.3)
RBC # BLD AUTO: 4.4 10E12/L (ref 3.8–5.2)
SODIUM SERPL-SCNC: 141 MMOL/L (ref 133–144)
TROPONIN I SERPL-MCNC: <0.015 UG/L (ref 0–0.04)
WBC # BLD AUTO: 8.6 10E9/L (ref 4–11)

## 2020-03-03 PROCEDURE — 93005 ELECTROCARDIOGRAM TRACING: CPT | Performed by: EMERGENCY MEDICINE

## 2020-03-03 PROCEDURE — 71046 X-RAY EXAM CHEST 2 VIEWS: CPT

## 2020-03-03 PROCEDURE — 99285 EMERGENCY DEPT VISIT HI MDM: CPT | Mod: 25 | Performed by: EMERGENCY MEDICINE

## 2020-03-03 PROCEDURE — 80048 BASIC METABOLIC PNL TOTAL CA: CPT | Performed by: EMERGENCY MEDICINE

## 2020-03-03 PROCEDURE — 84484 ASSAY OF TROPONIN QUANT: CPT | Performed by: EMERGENCY MEDICINE

## 2020-03-03 PROCEDURE — 85025 COMPLETE CBC W/AUTO DIFF WBC: CPT | Performed by: EMERGENCY MEDICINE

## 2020-03-03 PROCEDURE — 93010 ELECTROCARDIOGRAM REPORT: CPT | Mod: Z6 | Performed by: EMERGENCY MEDICINE

## 2020-03-03 RX ORDER — IBUPROFEN 800 MG/1
800 TABLET, FILM COATED ORAL EVERY 8 HOURS PRN
Qty: 60 TABLET | Refills: 0 | Status: SHIPPED | OUTPATIENT
Start: 2020-03-03 | End: 2020-05-21

## 2020-03-03 RX ORDER — CYCLOBENZAPRINE HCL 10 MG
10 TABLET ORAL 3 TIMES DAILY PRN
Qty: 20 TABLET | Refills: 0 | Status: SHIPPED | OUTPATIENT
Start: 2020-03-03 | End: 2020-05-21

## 2020-03-03 ASSESSMENT — MIFFLIN-ST. JEOR: SCORE: 1244.68

## 2020-03-03 NOTE — ED AVS SNAPSHOT
[facility], [place], Emergency Department  [address] [address]  Phone:  [phone]                                    Monet You   MRN: [id number]    Department:  [facility], Emergency Department   Date of Visit:  3/3/2020           After Visit Summary Signature Page    I have received my discharge instructions, and my questions have been answered. I have discussed any challenges I see with this plan with the nurse or doctor.    ..........................................................................................................................................  Patient/Patient Representative Signature      ..........................................................................................................................................  Patient Representative Print Name and Relationship to Patient    ..................................................               ................................................  Date                                   Time    ..........................................................................................................................................  Reviewed by Signature/Title    ...................................................              ..............................................  Date                                               Time          22EPIC Rev 08/18

## 2020-03-03 NOTE — ED TRIAGE NOTES
40 y/o F - presenting to ED for eval of new-onset chest pain/tightness; after lunch, today.  Patient states some high stress in life, currently.

## 2020-03-03 NOTE — ED PROVIDER NOTES
[place] EMERGENCY DEPARTMENT ([facility])  3/03/20    History     Chief Complaint   Patient presents with     Chest Pain     tightness, left-sided; states she feels stressed     The history is provided by the patient and medical records.     Monet You is a 39 year old female with a past medical history of psoriasis who presents to the Emergency Department with a chief complaint of chest pain.  It is located on the left side of her chest.  It is constant, but waxes and wanes it is sharp/tight in nature.  It radiates to her left shoulder and neck. Reports the pain is 5-6/10 in severity. The patient denies any associated shortness of breath, palpitations, cough, fevers, nausea, vomiting, dizziness, blurred vision or headache. Reports that her symptoms started last Wednesday (2/26/2020) and has been worsening. Does endorse increased fatigue since onset of symptoms. Reports that she had a similar episode 4-5 years ago, however, this instance her chest pain has been lingering. Reports that she took 800 mg of ibuprofen on Sunday (3/1/2020) without relief. The patient does endorse some recent increased stress in her life. Reports that she is the only on in her family that is currently working and has had lots of deaths in her family within the past year. Denies alcohol or tobacco use. Reports a family history of HTN.  No family history of coronary artery disease.  The patient does have a Mirena IUD in place since 2016.  Denies any personal or family history of blood clots.  No recent trauma, surgeries, or hospitalizations.  No leg pain or swelling.  Patient works here as a nurse.    I have reviewed the Medications, Allergies, Past Medical and Surgical History, and Social History in the [facility] system.    Past Medical History:   Diagnosis Date     Encounter for insertion of mirena IUD 06/27/2016     Menarche age    cycles q     X    d     Past Surgical History:   Procedure Laterality Date     D & C   5/06    miscarriage     No current facility-administered medications for this encounter.      Current Outpatient Medications   Medication     Cyanocobalamin (VITAMIN B 12 PO)     levonorgestrel (MIRENA) 20 MCG/24HR IUD     Multiple Vitamin (MULTIVITAMIN OR)        Allergies   Allergen Reactions     Ceclor [Cefaclor] Hives     Parabens Itching     Reglan [Metoclopramide Hcl]      restless     Social History     Socioeconomic History     Marital status:      Spouse name: Not on file     Number of children: Not on file     Years of education: Not on file     Highest education level: Not on file   Occupational History     Not on file   Social Needs     Financial resource strain: Not on file     Food insecurity:     Worry: Not on file     Inability: Not on file     Transportation needs:     Medical: Not on file     Non-medical: Not on file   Tobacco Use     Smoking status: Never Smoker     Smokeless tobacco: Never Used   Substance and Sexual Activity     Alcohol use: Yes     Alcohol/week: 0.0 standard drinks     Drug use: No     Sexual activity: Yes     Partners: Male     Birth control/protection: I.U.D.     Comment: Mirena   Lifestyle     Physical activity:     Days per week: Not on file     Minutes per session: Not on file     Stress: Not on file   Relationships     Social connections:     Talks on phone: Not on file     Gets together: Not on file     Attends Je[employer]'s witness service: Not on file     Active member of club or organization: Not on file     Attends meetings of clubs or organizations: Not on file     Relationship status: Not on file     Intimate partner violence:     Fear of current or ex partner: Not on file     Emotionally abused: Not on file     Physically abused: Not on file     Forced sexual activity: Not on file   Other Topics Concern     Parent/sibling w/ CABG, MI or angioplasty before 65F 55M? Not Asked   Social History Narrative    March 16, 2017    ENVIRONMENTAL HISTORY: The family [place] in a   [place] The home is heated with a gas furnace. They do have central air conditioning. The patient's bedroom is furnished with feather/wool bedding or pillows, carpeting in bedroom and allergen mattress cover.  Pets inside the house include 1 dog(s). There is not history of cockroach or mice infestation. There are no smokers in the house.  The house does not have a damp basement.        Review of Systems  General: No fevers or chills. Positive for fatigue.  Skin: No rash or diaphoresis  Eyes: No eye redness or discharge  Ears/Nose/Throat: No rhinorrhea or nasal congestion  Respiratory: No cough or SOB  Cardiovascular: Positive for chest pain, no palpitations  Gastrointestinal: No nausea, vomiting, or diarrhea  Genitourinary: No urinary frequency, hematuria, or dysuria  Musculoskeletal: Positive for neck and shoulder pain  Neurologic: No headaches, dizziness, numbness, or weakness  Psychiatric: Positive for increased stress  Hematologic/Lymphatic/Immunologic: No leg swelling, no easy bruising/bleeding  Endocrine: No polyuria/polydypsia      Physical Exam   BP: (!) 150/104  Pulse: 96  Heart Rate: 96  Temp: 98.1  F (36.7  C)  Resp: 18  Height: 157.5 cm (5' 2\")  Weight: 62.1 kg (137 lb)  SpO2: 99 %      General: Well nourished, well developed, NAD  HEENT: EOMI, anicteric. NCAT, MMM  Neck: no jugular venous distension, supple, nl ROM  Cardiac: Regular rate and rhythm. No murmurs, rubs, or gallops. Normal S1, S2.  Intact peripheral pulses  Pulm: CTAB, no stridor, wheezes, rales, rhonchi  Abd: Soft, nontender, nondistended.  No masses palpated.    Skin: Warm and dry to the touch.  No rash  Extremities: No LE edema, no cyanosis, w/w/p, no posterior calf tenderness  Neuro: A&Ox3, no gross focal deficits    ED Course        Procedures             EKG Interpretation:      Interpreted by Petra Roth MD  Time reviewed: 1726  Symptoms at time of EKG: Chest pain  Rhythm: normal sinus   Rate: " normal, 83 bpm  Axis: normal  Ectopy: none  Conduction: normal  ST Segments/ T Waves: No ST-T wave changes  Q Waves: none  Comparison to prior: Compared to prior EKG, dated 9/25/2013, there are no acute changes  Clinical Impression: normal EKG                        Labs Ordered and Resulted from Time of ED Arrival Up to the Time of Departure from the ED   BASIC METABOLIC PANEL - Abnormal; Notable for the following components:       Result Value    Chloride 112 (*)     Glucose 110 (*)     All other components within normal limits   TROPONIN I   CBC WITH PLATELETS DIFFERENTIAL   PERIPHERAL IV CATHETER   CARDIAC CONTINUOUS MONITORING   PULSE OXIMETRY NURSING            Results for orders placed or performed during the hospital encounter of 03/03/20 (from the past 24 hour(s))   Troponin I   Result Value Ref Range    Troponin I ES <0.015 0.000 - 0.045 ug/L   Basic metabolic panel   Result Value Ref Range    Sodium 141 133 - 144 mmol/L    Potassium 3.9 3.4 - 5.3 mmol/L    Chloride 112 (H) 94 - 109 mmol/L    Carbon Dioxide 25 20 - 32 mmol/L    Anion Gap 4 3 - 14 mmol/L    Glucose 110 (H) 70 - 99 mg/dL    Urea Nitrogen 13 7 - 30 mg/dL    Creatinine 0.71 0.52 - 1.04 mg/dL    GFR Estimate >90 >60 mL/min/[1.73_m2]    GFR Estimate If Black >90 >60 mL/min/[1.73_m2]    Calcium 8.8 8.5 - 10.1 mg/dL   CBC with platelets differential   Result Value Ref Range    WBC 8.6 4.0 - 11.0 10e9/L    RBC Count 4.40 3.8 - 5.2 10e12/L    Hemoglobin 14.1 11.7 - 15.7 g/dL    Hematocrit 42.3 35.0 - 47.0 %    MCV 96 78 - 100 fl    MCH 32.0 26.5 - 33.0 pg    MCHC 33.3 31.5 - 36.5 g/dL    RDW 11.9 10.0 - 15.0 %    Platelet Count 282 150 - 450 10e9/L    Diff Method Automated Method     % Neutrophils 65.9 %    % Lymphocytes 25.9 %    % Monocytes 6.2 %    % Eosinophils 1.1 %    % Basophils 0.5 %    % Immature Granulocytes 0.4 %    Nucleated RBCs 0 0 /100    Absolute Neutrophil 5.7 1.6 - 8.3 10e9/L    Absolute Lymphocytes 2.2 0.8 - 5.3 10e9/L    Absolute  Monocytes 0.5 0.0 - 1.3 10e9/L    Absolute Eosinophils 0.1 0.0 - 0.7 10e9/L    Absolute Basophils 0.0 0.0 - 0.2 10e9/L    Abs Immature Granulocytes 0.0 0 - 0.4 10e9/L    Absolute Nucleated RBC 0.0    EKG 12-lead, tracing only   Result Value Ref Range    Interpretation ECG Click View Image link to view waveform and result    XR Chest 2 Views    Narrative    Prelim:       Impression    Impression: No acute cardiac or pulmonary abnormalities.        Labs, vital signs, and imaging studies were reviewed by me.    Medications - No data to display    Assessments & Plan (with Medical Decision Making)   Monet You is a 39 year old female who presents the emergency department chief complaint of chest pain.  Differential diagnosis includes musculoskeletal chest wall pain, pneumonia.  ACS is less likely due to patient's age and lack of risk factors.  PE is also less likely as patient's heart rate is within normal limits at 96 bpm, SPO2 is 99% on room air.  The patient declined any medications for pain in the emergency department.    Laboratory work-up is unremarkable.    Chest x-ray shows no acute disease process.    EKG is unremarkable.    I have reviewed the nursing notes.    I have reviewed the findings, diagnosis, plan and need for follow up with the patient.    Patient to be discharged home. Advised to follow up with PCP within 1 week. To return to ER immediately with any new/worsening symptoms. Plan of care discussed with patient who expresses understanding and agrees with plan of care.  Prescription for Flexeril and Motrin given for pain control at home.      Discharge Medication List as of 3/3/2020  6:10 PM      START taking these medications    Details   cyclobenzaprine (FLEXERIL) 10 MG tablet Take 1 tablet (10 mg) by mouth 3 times daily as needed for muscle spasms, Disp-20 tablet, R-0, Local Print      ibuprofen (ADVIL/MOTRIN) 800 MG tablet Take 1 tablet (800 mg) by mouth every 8 hours as needed for  moderate pain, Disp-60 tablet, R-0, Local Print             Final diagnoses:   Acute chest pain     IRandy, am serving as a trained medical scribe to document services personally performed by Petra Roth MD, based on the provider's statements to me.   IPetra MD, was physically present and have reviewed and verified the accuracy of this note documented by Randy Raygoza.    3/3/2020   [facility], [place], EMERGENCY DEPARTMENT     Petra Roth MD  03/03/20 2009

## 2020-03-03 NOTE — DISCHARGE INSTRUCTIONS
TODAY'S VISIT:  You were seen today for chest pain   -   - If you had any labs or imaging/radiology tests performed today, you should also discuss these tests with your usual provider.     FOLLOW-UP:  Please make an appointment to follow up with:  - Your Primary Care Provider. If you do not have a PCP, please call the Primary Care Center (phone: [phone] for an appointment    - Have your provider review the results from today's visit with you again to make sure no further follow-up or additional testing is needed based on those results.     PRESCRIPTIONS / MEDICATIONS:  - ibuprofen  - flexeril    RETURN TO THE EMERGENCY DEPARTMENT  Return to the Emergency Department at any time for any new or worsening symptoms or any concerns.

## 2020-03-03 NOTE — ED NOTES
ED Triage Provider Note  [facility]  Encounter Date: Mar 3, 2020    History:  No chief complaint on file.    Monet You is a 39 year old female who presents to the ED with chest pain. Pain is left sided, constant, waxing and waning in severity, sharp, radiating to left neck and shoulder. No shortness of breath. No cough or fevers.    Review of Systems:  Chest pain    Exam:  There were no vitals taken for this visit.  General: No acute distress. Appears stated age.   Cardio: Regular rate, extremities well perfused  Resp: Normal work of breathing, grossly normal respiratory rate  Neuro: Alert. CN II-XII grossly intact. Grossly intact strength.       Medical Decision Making:  Patient arriving to the ED with problem as above. A medical screening exam was performed. Orders initiated from Triage. The patient is appropriate to be immediately roomed.      Jane Rodríguez MD on 3/3/2020 at 4:01 PM     Jane Rodríguez MD  03/03/20 1609

## 2020-03-04 ENCOUNTER — MYC MEDICAL ADVICE (OUTPATIENT)
Dept: FAMILY MEDICINE | Facility: CLINIC | Age: 40
End: 2020-03-04

## 2020-03-09 ENCOUNTER — OFFICE VISIT (OUTPATIENT)
Dept: FAMILY MEDICINE | Facility: CLINIC | Age: 40
End: 2020-03-09
Payer: COMMERCIAL

## 2020-03-09 VITALS
SYSTOLIC BLOOD PRESSURE: 118 MMHG | WEIGHT: 136 LBS | DIASTOLIC BLOOD PRESSURE: 81 MMHG | BODY MASS INDEX: 24.87 KG/M2 | TEMPERATURE: 97.4 F | HEART RATE: 87 BPM | RESPIRATION RATE: 18 BRPM | OXYGEN SATURATION: 98 %

## 2020-03-09 DIAGNOSIS — R07.89 CHEST WALL PAIN: ICD-10-CM

## 2020-03-09 DIAGNOSIS — R53.83 FATIGUE, UNSPECIFIED TYPE: Primary | ICD-10-CM

## 2020-03-09 DIAGNOSIS — F41.9 ANXIETY: ICD-10-CM

## 2020-03-09 DIAGNOSIS — Z86.39 HISTORY OF VITAMIN D DEFICIENCY: ICD-10-CM

## 2020-03-09 LAB
ALBUMIN SERPL-MCNC: 4.1 G/DL (ref 3.4–5)
ALP SERPL-CCNC: 72 U/L (ref 40–150)
ALT SERPL W P-5'-P-CCNC: 22 U/L (ref 0–50)
AST SERPL W P-5'-P-CCNC: 10 U/L (ref 0–45)
BILIRUB DIRECT SERPL-MCNC: 0.2 MG/DL (ref 0–0.2)
BILIRUB SERPL-MCNC: 0.6 MG/DL (ref 0.2–1.3)
CRP SERPL-MCNC: <2.9 MG/L (ref 0–8)
ERYTHROCYTE [SEDIMENTATION RATE] IN BLOOD BY WESTERGREN METHOD: 6 MM/H (ref 0–20)
FERRITIN SERPL-MCNC: 90 NG/ML (ref 12–150)
LIPASE SERPL-CCNC: 204 U/L (ref 73–393)
PROT SERPL-MCNC: 7.8 G/DL (ref 6.8–8.8)
TSH SERPL DL<=0.005 MIU/L-ACNC: 2.49 MU/L (ref 0.4–4)
VIT B12 SERPL-MCNC: 682 PG/ML (ref 193–986)

## 2020-03-09 PROCEDURE — 85652 RBC SED RATE AUTOMATED: CPT | Performed by: PHYSICIAN ASSISTANT

## 2020-03-09 PROCEDURE — 99214 OFFICE O/P EST MOD 30 MIN: CPT | Performed by: PHYSICIAN ASSISTANT

## 2020-03-09 PROCEDURE — 82306 VITAMIN D 25 HYDROXY: CPT | Performed by: PHYSICIAN ASSISTANT

## 2020-03-09 PROCEDURE — 83690 ASSAY OF LIPASE: CPT | Performed by: PHYSICIAN ASSISTANT

## 2020-03-09 PROCEDURE — 86140 C-REACTIVE PROTEIN: CPT | Performed by: PHYSICIAN ASSISTANT

## 2020-03-09 PROCEDURE — 82607 VITAMIN B-12: CPT | Performed by: PHYSICIAN ASSISTANT

## 2020-03-09 PROCEDURE — 84443 ASSAY THYROID STIM HORMONE: CPT | Performed by: PHYSICIAN ASSISTANT

## 2020-03-09 PROCEDURE — 36415 COLL VENOUS BLD VENIPUNCTURE: CPT | Performed by: PHYSICIAN ASSISTANT

## 2020-03-09 PROCEDURE — 82728 ASSAY OF FERRITIN: CPT | Performed by: PHYSICIAN ASSISTANT

## 2020-03-09 PROCEDURE — 80076 HEPATIC FUNCTION PANEL: CPT | Performed by: PHYSICIAN ASSISTANT

## 2020-03-09 NOTE — PROGRESS NOTES
Subjective     Monet You is a 39 year old female who presents to clinic today for the following health issues:    HPI   ED/UC Followup:    Facility:  [facility], , ED  Date of visit: 03/03/2020  Reason for visit: Chest pain  Current Status: feels a little better, still has chest pain, fatigue, tiredness    Pain when pressing on the left chest wall  Some pain with movement  Denies shortness of breath, cough, wheezing, abdominal/epigastric pain  Has been overly stressed out the last year, increased anxiety  One thing happening after the other    EKG normal, troponin neg  CBC, CMP unremarkable  CXR neg       PROBLEMS TO ADD ON...    Vaginal Symptoms      Duration: x3wks    Description  Suprapubic lump    Intensity:  mild    Accompanying signs and symptoms (fever/dysuria/abdominal or back pain): None    History  Sexually active: yes, single partner, contraception - IUD  Possibility of pregnancy: No  Recent antibiotic use: no     Precipitating or alleviating factors: None    Therapies tried and outcome: ibuprofen       No sxs of yeast or BV      Patient Active Problem List   Diagnosis     Mirena IUD (intrauterine device) in place     Psoriasis     Past Surgical History:   Procedure Laterality Date     D & C  5/06    miscarriage       Social History     Tobacco Use     Smoking status: Never Smoker     Smokeless tobacco: Never Used   Substance Use Topics     Alcohol use: Yes     Alcohol/week: 0.0 standard drinks     Family History   Problem Relation Age of Onset     Eye Disorder Mother         glaucoma     Rheumatoid Arthritis Mother      Hypertension Father      Diabetes Father         Type 2     Breast Cancer Maternal Grandmother 65     Hypertension Maternal Grandmother      Hypertension Paternal Grandmother      Thyroid Disease Paternal Grandmother      Breast Cancer Paternal Grandmother         Ronni     Abdominal Aortic Aneurysm Paternal Grandfather      Hypertension Paternal Grandfather      Kidney Disease  "Maternal Grandfather         Goodpaster's      Cerebrovascular Disease No family hx of      Cancer - colorectal No family hx of            Reviewed and updated as needed this visit by Provider         Review of Systems   ROS COMP: Constitutional, ENT, cardiovascular, pulmonary, GI, , musculoskeletal, and psych systems are negative, except as otherwise noted.      Objective    There were no vitals taken for this visit.  There is no height or weight on file to calculate BMI.  Physical Exam   Constitutional: healthy, alert, active, no distress.    Head: Normocephalic  Musculoskeletal: extremities normal- no gross deformities noted, gait normal, normal muscle tone and able to move about the exam room without difficulty.    Skin: no suspicious lesions or rashes appreciated on exposed areas  Abdominal: no suprapubic masses or abnormalities   Neurologic: Gait normal. Moving all extremities spontaneously, no apparent weakness.    Psychiatric: mentation appears normal, thoughts are clear and concise. Converses appropriately. Affect is Anxious/Nervous          Assessment & Plan   Assessment  1. Fatigue, unspecified type    2. Chest wall pain    3. History of vitamin D deficiency    4. Anxiety         Plan  1-3) ER records reviewed. Will obtain some additional labs today. Follow up pending results. Her symptoms sound most consistent with chest wall pain (musculoskeletal origin).     4) Referral to counseling. Also recommend regular exercise and meditation. Follow up if no improvements.      BMI:   Estimated body mass index is 24.87 kg/m  as calculated from the following:    Height as of 3/3/20: 1.575 m (5' 2\").    Weight as of this encounter: 61.7 kg (136 lb).       Return in about 3 months (around 6/9/2020), or if symptoms worsen or fail to improve.    Bianka Villaseñor PA-C  [facility]          "

## 2020-03-10 LAB — DEPRECATED CALCIDIOL+CALCIFEROL SERPL-MC: 45 UG/L (ref 20–75)

## 2020-03-26 ENCOUNTER — OFFICE VISIT - HEALTHEAST (OUTPATIENT)
Dept: FAMILY MEDICINE | Facility: CLINIC | Age: 40
End: 2020-03-26

## 2020-03-26 DIAGNOSIS — R05.9 COUGH: ICD-10-CM

## 2020-05-21 ENCOUNTER — ANCILLARY PROCEDURE (OUTPATIENT)
Dept: ULTRASOUND IMAGING | Facility: CLINIC | Age: 40
End: 2020-05-21
Attending: ADVANCED PRACTICE MIDWIFE
Payer: COMMERCIAL

## 2020-05-21 ENCOUNTER — OFFICE VISIT (OUTPATIENT)
Dept: OBGYN | Facility: CLINIC | Age: 40
End: 2020-05-21
Attending: ADVANCED PRACTICE MIDWIFE
Payer: COMMERCIAL

## 2020-05-21 VITALS
HEART RATE: 80 BPM | DIASTOLIC BLOOD PRESSURE: 83 MMHG | WEIGHT: 134 LBS | SYSTOLIC BLOOD PRESSURE: 117 MMHG | BODY MASS INDEX: 24.51 KG/M2

## 2020-05-21 DIAGNOSIS — Z30.431 INTRAUTERINE DEVICE SURVEILLANCE: Primary | ICD-10-CM

## 2020-05-21 DIAGNOSIS — Z30.431 SURVEILLANCE OF INTRAUTERINE CONTRACEPTIVE DEVICE: Primary | ICD-10-CM

## 2020-05-21 DIAGNOSIS — Z30.431 INTRAUTERINE DEVICE SURVEILLANCE: ICD-10-CM

## 2020-05-21 LAB
CLUE CELLS: NEGATIVE
TRICHOMONAS (WET PREP): NEGATIVE
YEAST (WET PREP): NEGATIVE

## 2020-05-21 PROCEDURE — 76830 TRANSVAGINAL US NON-OB: CPT

## 2020-05-21 PROCEDURE — 87210 SMEAR WET MOUNT SALINE/INK: CPT | Mod: ZF | Performed by: ADVANCED PRACTICE MIDWIFE

## 2020-05-21 PROCEDURE — G0463 HOSPITAL OUTPT CLINIC VISIT: HCPCS | Mod: ZF

## 2020-05-21 ASSESSMENT — PAIN SCALES - GENERAL: PAINLEVEL: NO PAIN (0)

## 2020-05-21 NOTE — PROGRESS NOTES
"S: Monet You is a 40 year old  here for evaluation of Mirena IUD placement.      Pt had Mirena IUD placed 20. This is her 2nd IUD. She reports that she has been happy with this form of contraception- no menses or irregular spotting until recently.   Pt reports that for the last few weeks she has had heavy bleeding \"on and off\" as well as a lot of cramping. Not currently having bleeding.    She feels that the IUD strings are lower than before.  Also notes a rare \"weird feeling\" in her right lower quadrant- describes that it sometimes feels like a \"bump\" with a sensation similar to \"a fetal kick.\" No exacerbating or relieving factors.   Reports regular BM. Reports recent weight loss- wondering if this is why she can feel things better on her abdomen.     Currently sexually active with one male partner. Declines STI testing.  Denies vaginal discharge, irritation, odor. No urinary s/s.     Patient had transvaginal ultrasound prior to appointment.   IUD confirmed in correct position. No fibroids or adnexal masses.   Normal ultrasound.      O: /83   Pulse 80   Wt 60.8 kg (134 lb)   Breastfeeding No   BMI 24.51 kg/m        Pelvic Exam:  EG/BUS:  Normal genital architecture without lesions, erythema,  Bartholin's, Urethra, Toluca's normal   Vagina: pink, moist, normal discharge, no prolapse with valsalva  Urethral meatus: normal without lesion  Urethra: no masses, tenderness, or scarring   Bladder: no masses or tenderness   Cervix: visually closed, IUD strings visible to external os, approximately 3.5cm, curved pressing into vaginal wall, rough ends    Procedure: IUD strings trimmed from 3.5 cm to 1.5cm     Exam:   Abdomen: soft, nontender in all 4 quadrants, +BS   \"Twinge\" feeling recreated in RLQ with palpation- denies tenderness   No palpable lump, mass   No hernia     Wet prep: ph 5.0, neg clue cells, neg hyphae/buds, neg whiff test, neg trich     UPT negative    Ultrasound:  Uterine " findings:    Presence: Visible Size: Normal 5.5x5.5x4.4 cm.  Endometrium = 2.5 mm.    Cx length = 33.6 mm.        Flexion:  Retroverted Position: Midline Margins: Smooth Shape: Normal    Contour: Regular Texture: Homogeneous Cavity: Normal - IUD in place Masses: Normal     Pelvic findings:    Right Adnexa: Normal    Left Adnexa: Normal    Bladder:  Normal          Cul - de - sac fluid: None     Ovarian follicles:    Right ovary:  3.0x1.9x1.6cm.      Small follicles      Size(s):  Largest - 9 x 8 x 9mm      Left ovary:  3.0x2.1x1.7cm.      Small follicles      Size(s):  Largest - 10 x 10 x 10mm       Comments:        Normally placed IUD. No intrauterine findings. Normal appearing ovaries with several bilateral follicles.    ADA Easton MD MSCI        A: (Z30.431) Surveillance of intrauterine contraceptive device  (primary encounter diagnosis)  Plan: Transvaginal ultrasound, hCG qual urine POCT, Wet Prep POCT     P:  - Reviewed normal ultrasound report.  - Reviewed negative wet prep, negative UPT.  - Mirena IUD strings trimmed- see procedure note above. May be removed and replaced between 6/2021-6/2023.  - Discussed Trial of NSAIDs for cramping and bleeding: Take ibuprofen 400 to 800mg three times a day for 5-10 days.  - Recommended virtual visit with GI.   - As pt is no longer having bleeding, encouraged pt to journal bleeding patterns.   Notify provider if return to heavy bleeding or bothersome symptoms.     Pt verbalized understanding and agrees with plan of care.     RTC prn.    HIRA Maciel CNM

## 2020-05-21 NOTE — LETTER
5/21/2020       RE: Monet You  [address] [facility] [place] [address]     Dear Colleague,    Thank you for referring your patient, Monet You, to the [facility] at [facility]. Please see a copy of my visit note below.    - Mirena IUD in place -  Placed 6/27/2016.  May be removed and replaced between 6/2021-6/2023.    - Ultrasound normal.    - Wet prep negative.    - Trial of NSAIDs for cramping and bleeding: Take ibuprofen 400 to 800mg three times a day for 5-10 days.    - Consider virtual visit with GI.      Again, thank you for allowing me to participate in the care of your patient.      Sincerely,    Amena Acosta CNM

## 2020-05-21 NOTE — PATIENT INSTRUCTIONS
- Mirena IUD in place -  Placed 6/27/2016.  May be removed and replaced between 6/2021-6/2023.    - Ultrasound normal.    - Wet prep negative.    - Trial of NSAIDs for cramping and bleeding: Take ibuprofen 400 to 800mg three times a day for 5-10 days.    - Consider virtual visit with GI.

## 2020-07-15 ENCOUNTER — OFFICE VISIT (OUTPATIENT)
Dept: FAMILY MEDICINE | Facility: CLINIC | Age: 40
End: 2020-07-15
Payer: COMMERCIAL

## 2020-07-15 VITALS
DIASTOLIC BLOOD PRESSURE: 78 MMHG | OXYGEN SATURATION: 99 % | SYSTOLIC BLOOD PRESSURE: 119 MMHG | RESPIRATION RATE: 16 BRPM | WEIGHT: 134 LBS | HEART RATE: 66 BPM | HEIGHT: 64 IN | TEMPERATURE: 97.1 F | BODY MASS INDEX: 22.88 KG/M2

## 2020-07-15 DIAGNOSIS — Z20.822 SUSPECTED COVID-19 VIRUS INFECTION: Primary | ICD-10-CM

## 2020-07-15 DIAGNOSIS — M79.604 ACUTE PAIN OF RIGHT LOWER EXTREMITY: ICD-10-CM

## 2020-07-15 DIAGNOSIS — Z00.00 HEALTHCARE MAINTENANCE: ICD-10-CM

## 2020-07-15 LAB — D DIMER PPP FEU-MCNC: <0.3 UG/ML FEU (ref 0–0.5)

## 2020-07-15 PROCEDURE — 99000 SPECIMEN HANDLING OFFICE-LAB: CPT | Performed by: PHYSICIAN ASSISTANT

## 2020-07-15 PROCEDURE — 99214 OFFICE O/P EST MOD 30 MIN: CPT | Performed by: PHYSICIAN ASSISTANT

## 2020-07-15 PROCEDURE — 36415 COLL VENOUS BLD VENIPUNCTURE: CPT | Performed by: PHYSICIAN ASSISTANT

## 2020-07-15 PROCEDURE — 86769 SARS-COV-2 COVID-19 ANTIBODY: CPT | Mod: 90 | Performed by: PHYSICIAN ASSISTANT

## 2020-07-15 PROCEDURE — 85379 FIBRIN DEGRADATION QUANT: CPT | Performed by: PHYSICIAN ASSISTANT

## 2020-07-15 PROCEDURE — 80061 LIPID PANEL: CPT | Performed by: PHYSICIAN ASSISTANT

## 2020-07-15 RX ORDER — PREDNISONE 20 MG/1
40 TABLET ORAL DAILY
Qty: 10 TABLET | Refills: 0 | Status: SHIPPED | OUTPATIENT
Start: 2020-07-15 | End: 2020-07-20

## 2020-07-15 ASSESSMENT — MIFFLIN-ST. JEOR: SCORE: 1252.44

## 2020-07-15 NOTE — PATIENT INSTRUCTIONS
Samuel Healy,    Thank you for allowing [facility] to manage your care.    We are unsure the cause of your symptoms, however this could be due to a pinched nerve in your back.  Please see us in 2 weeks if you are not improving and go to the emergency department if you develop numbness or tingling in the groin that does not go away, or incontinence or weakness.    I ordered some blood work, please go to the laboratory to get your laboratory studies.    I sent your prescriptions to your pharmacy.    For your pain, please use Ibuprofen 400mg four times daily with food. Between ibuprofen doses, you may use Tylenol 650mg.     Max acetaminophen (Tylenol) 4,000mg/24 hours  Max ibuprofen 3,200mg/24 hours    Prednisone Discharge Instructions:    Please take the steroid, Prednisone, for the full course as prescribed.  Take Prednisone with food or milk to minimize stomach upset.      Side effects of Prednisone include difficulty sleeping, increased appetite, weight gain, and changes in mood.  If you are diabetic, please monitor your blood sugar regularly while taking this medicine as Prednisone can cause high blood sugar.    I made a referral, they will be calling in approximately 1 week to set up your appointment.  If you do not hear from them, please call the specialty number on your after visit.     Please allow 1-2 business days for our office to call you in regards to your laboratory/radiological studies.  If not done so, I encourage you to login into [email or web address] ([email or web address] to review your results as well.     If you have any questions or concerns, please feel free to call us at [phone]    Sincerely,    Amari Serrato PA-C    Did you know?  You can schedule an e-Visit for certain simple non-emergent issue for your convenience.  To learn more about or start an eVisit, simply login to [employer], click  Visits  on top banner, click  Start a Virtual Visit  drop down, and click   Symptom-Specific E-Visit     Patient Education     Understanding Lumbar Radiculopathy    Lumbar radiculopathy is irritation or inflammation of a nerve root in the low back. It causes symptoms that spread out from the back down one or both legs. To understand this condition, it helps to understand the parts of the spine:    Vertebrae. These are bones that stack to form the spine. The lumbar spine contains the 5 bottom vertebrae.    Disks. These are soft pads of tissue between the vertebrae. They act as shock absorbers for the spine.    Spinal canal. This is a tunnel formed within the stacked vertebrae. In the lumbar spine, nerves run through this canal.    Nerves. These branch off and leave the spinal canal, traveling out to parts of the body. As they leave the spinal canal, nerves pass through openings between the vertebrae. The nerve root is the part of the nerve that is closest to the spinal canal.    Sciatic nerve. This is a large nerve formed from several nerve roots in the low back. This nerve extends down the back of the leg to the foot.  With lumbar radiculopathy, nerve roots in the low back become irritated. This leads to pain and symptoms. The sciatic nerve is commonly involved, so the condition is often called sciatica.  What causes lumbar radiculopathy?  Aging, injury, poor posture, extra body weight, and other issues can lead to problems in the low back. These problems may then irritate nerve roots. They include:    Damage to a disk in the lumbar spine. The damaged disk may then press on nearby nerve roots.    Degeneration from wear and tear, and aging. This can lead to narrowing (stenosis) of the openings between the vertebrae. The narrowed openings press on nerve roots as they leave the spinal canal.    Unstable spine. This is when a vertebra slips forward. It can then press on a nerve root.  Other, less common things can put pressure on nerves in the low back. These include diabetes, infection, or a  tumor.  Symptoms of lumbar radiculopathy  These include:    Pain in the low back    Pain, numbness, tingling, or weakness that travels into the buttocks, hip, groin, or leg    Muscle spasms  Treatment for lumbar radiculopathy  In most cases, your healthcare provider will first try treatments that help relieve symptoms. These may include:    Prescription and over-the-counter pain medicines. These help relieve pain, swelling, and irritation.    Limits on positions and activities that increase pain. But lying in bed or avoiding all movement is only recommended for a short period of time.    Physical therapy, including exercises and stretches. This helps decrease pain and increase movement and function.    Steroid shots into the lower back. This may help relieve symptoms for a time.    Weight-loss program. If you are overweight, losing extra pounds may help relieve symptoms.  In some cases, you may need surgery to fix the underlying problem. This depends on the cause, the symptoms, and how long the pain has lasted.  Possible complications  Over time, an irritated and inflamed nerve may become damaged. This may lead to long-lasting (permanent) numbness or weakness in your legs and feet. If symptoms change suddenly or get worse, be sure to let your healthcare provider know.  When to call your healthcare provider  Call your healthcare provider right away if you have any of these:    New pain or pain that gets worse    New or increasing weakness, tingling, or numbness in your leg or foot    Problems controlling your bladder or bowel   Date Last Reviewed: 3/10/2016    [id number] [employer]. [address] [place] [address], [place] [id number]. All rights reserved. This information is not intended as a substitute for professional medical care. Always follow your healthcare professional's instructions.

## 2020-07-15 NOTE — PROGRESS NOTES
Subjective     Monet You is a 40 year old female who presents to clinic today for the following health issues:    HPI       Musculoskeletal problem/pain      Duration: 2-3 weeks    Description  Location: right leg, upper thigh posteriorly. Radiates up to right buttock and yesterday had tingling in feet.    Intensity:  mild, moderate    Accompanying signs and symptoms: tingling and shooting pain, sore    History  Previous similar problem: no   Previous evaluation:  none    Precipitating or alleviating factors:  Trauma or overuse: no   Aggravating factors include: sitting, climbing stairs, lifting and overuse    Therapies tried and outcome: rest/inactivity, heat, ice, massage, stretching, exercises, Ibuprofen and chiropractor; nothing made it go away    Patient Active Problem List   Diagnosis     Mirena IUD (intrauterine device) in place     Psoriasis     Past Surgical History:   Procedure Laterality Date     D & C  5/06    miscarriage       Social History     Tobacco Use     Smoking status: Never Smoker     Smokeless tobacco: Never Used   Substance Use Topics     Alcohol use: Yes     Alcohol/week: 0.0 standard drinks     Comment: social     Family History   Problem Relation Age of Onset     Eye Disorder Mother         glaucoma     Rheumatoid Arthritis Mother      Hypertension Father      Diabetes Father         Type 2     Breast Cancer Maternal Grandmother 65     Hypertension Maternal Grandmother      Hypertension Paternal Grandmother      Thyroid Disease Paternal Grandmother      Breast Cancer Paternal Grandmother         Ronni     Abdominal Aortic Aneurysm Paternal Grandfather      Hypertension Paternal Grandfather      Kidney Disease Maternal Grandfather         Goodpaster's      Cerebrovascular Disease No family hx of      Cancer - colorectal No family hx of          Current Outpatient Medications   Medication Sig Dispense Refill     levonorgestrel (MIRENA) 20 MCG/24HR IUD 1 each by Intrauterine  route once.       Multiple Vitamin (MULTIVITAMIN OR) Take  by mouth daily.       predniSONE (DELTASONE) 20 MG tablet Take 2 tablets (40 mg) by mouth daily for 5 days 10 tablet 0     triamcinolone (KENALOG) 0.025 % external ointment Apply topically 2 times daily x5-7 days 30 g 0     Cyanocobalamin (VITAMIN B 12 PO)        Allergies   Allergen Reactions     Ceclor [Cefaclor] Hives     Parabens Itching     Reglan [Metoclopramide Hcl]      restless       Reviewed and updated as needed this visit by Provider         Review of Systems   Constitutional, HEENT, cardiovascular, pulmonary, gi and gu systems are negative, except as otherwise noted.      Objective    There were no vitals taken for this visit.  There is no height or weight on file to calculate BMI.  Physical Exam  Vitals signs and nursing note reviewed.   Constitutional:       General: She is not in acute distress.     Appearance: She is not ill-appearing or diaphoretic.   HENT:      Head: Normocephalic and atraumatic.      Mouth/Throat:      Mouth: Mucous membranes are moist.   Eyes:      Conjunctiva/sclera: Conjunctivae normal.   Cardiovascular:      Rate and Rhythm: Normal rate and regular rhythm.      Heart sounds: Normal heart sounds. No murmur. No friction rub. No gallop.       Comments: 2+ symmetric PT pulses.  Pulmonary:      Effort: Pulmonary effort is normal. No respiratory distress.      Breath sounds: Normal breath sounds. No stridor. No wheezing, rhonchi or rales.   Abdominal:      General: Bowel sounds are normal. There is no distension.      Palpations: Abdomen is soft. There is no mass.      Tenderness: There is no abdominal tenderness. There is no right CVA tenderness, left CVA tenderness, guarding or rebound.      Hernia: No hernia is present.   Musculoskeletal:      Comments: No lower extremity tenderness or edema.   Skin:     General: Skin is warm and dry.   Neurological:      General: No focal deficit present.      Mental Status: She is  alert. Mental status is at baseline.      Comments: Negative SLR bilaterally.   Psychiatric:         Mood and Affect: Mood normal.         Behavior: Behavior normal.          Diagnostic Test Results:  Labs reviewed in Epic  Results for orders placed or performed in visit on 07/15/20   D dimer, quantitative     Status: None   Result Value Ref Range    D Dimer <0.3 0.0 - 0.50 ug/ml FEU     Assessment & Plan   Based on history and exam, the most likely etiology of this patient's right leg pain is lumbosacral radiculopathy vs muscle spasm/strain. Emergent MRI is not indicated as this patient does not have new weakness or cauda equina syndrome.  Patient has no bowel or bladder incontinence.      I do not believe a fracture to be the source of this patient's pain as there was no preceding trauma.  Based on this, no imaging of the spine was done.    I do not believe the pain is caused by an epidural abscess as the patient denies hx of IV drug use and does not have fevers/chills and no recent procedures.    I do not believe this patient's pain is from an infiltrative vertebral tumor as the patient does not have weight loss or night sweats and no known history of cancer.    I do not believe this patient's pain represents a rupture AAA.  There is no palpable, pulsatile mass on exam and patient does not have any ABD pain.    D dimer negative. Unlikely DVT.    Will send home with course of prednisone, otc analgesics, RICE/heat and follow up with us in 2 weeks if not improving. PT referral also placed. 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 Complete history and physical exam as above. AF with normal VS.    DDx and Dx discussed with and explained to the pt to their satisfaction.  All questions were answered at this time. Pt expressed understanding of and agreement with this dx, tx, and plan. No further workup warranted and standard medication warnings given. I have given the patient a list of pertinent  indications for re-evaluation. Will go to the Emergency Department if symptoms worsen or new concerning symptoms arise. Patient left in no apparent distress.    ICD-10-CM    1. Suspected COVID-19 virus infection  Z20.828 COVID-19 Virus (Coronavirus) Antibody & Titer Reflex     COVID-19 Virus (Coronavirus) Antibody & Titer Reflex   2. Acute pain of right lower extremity  M79.604 D dimer, quantitative     predniSONE (DELTASONE) 20 MG tablet     [facility], [facility] REFERRAL   3. Healthcare maintenance  Z00.00 Lipid panel reflex to direct LDL Fasting          See Patient Instructions    Return in about 2 weeks (around 7/29/2020), or if symptoms worsen or fail to improve.    ZARA Mcnulty  [facility]

## 2020-07-16 ENCOUNTER — MYC MEDICAL ADVICE (OUTPATIENT)
Dept: FAMILY MEDICINE | Facility: CLINIC | Age: 40
End: 2020-07-16

## 2020-07-16 LAB
CHOLEST SERPL-MCNC: 166 MG/DL
COVID-19 SPIKE RBD ABY TITER: NORMAL
COVID-19 SPIKE RBD ABY: NEGATIVE
HDLC SERPL-MCNC: 60 MG/DL
LDLC SERPL CALC-MCNC: 94 MG/DL
NONHDLC SERPL-MCNC: 106 MG/DL
TRIGL SERPL-MCNC: 62 MG/DL

## 2020-07-16 NOTE — LETTER
[facility] [place]  [address]  [place] [address]  Phone: [phone]    July 27, 2020        Monet You  [address] [facility]  [place] [address]          To whom it may concern:    RE: Monet You    It has been recommended that Monet decrease her work hours to 8 hour shifts for ~2 weeks. We discussed this on 7/20/20. I anticipate her return to normal scheduled hours on 8/3/20.    Please contact me for questions or concerns.      Sincerely,        ZARA Mcnulty

## 2020-07-24 ENCOUNTER — THERAPY VISIT (OUTPATIENT)
Dept: PHYSICAL THERAPY | Facility: CLINIC | Age: 40
End: 2020-07-24
Attending: PHYSICIAN ASSISTANT
Payer: COMMERCIAL

## 2020-07-24 DIAGNOSIS — M54.41 BILATERAL LOW BACK PAIN WITH RIGHT-SIDED SCIATICA: Primary | ICD-10-CM

## 2020-07-24 DIAGNOSIS — M79.604 ACUTE PAIN OF RIGHT LOWER EXTREMITY: ICD-10-CM

## 2020-07-24 PROCEDURE — 97161 PT EVAL LOW COMPLEX 20 MIN: CPT | Mod: GP | Performed by: PHYSICAL THERAPIST

## 2020-07-24 PROCEDURE — 97110 THERAPEUTIC EXERCISES: CPT | Mod: GP | Performed by: PHYSICAL THERAPIST

## 2020-07-24 NOTE — PROGRESS NOTES
[facility] Initial Evaluation  Subjective:    Patient Health History  Monet You being seen for low back pain.     Problem began: 7/3/2020.   Problem occurred: unknown   Pain is reported as 2/10 on pain scale.  General health as reported by patient is excellent.  Pertinent medical history includes: none.   Red flags:  Numbness in perianal region.   Other medical allergies details: parabans.   Surgeries include:  None.    Current medications:  None.    Current occupation is RN.   Primary job tasks include:  Computer work, lifting/carrying, prolonged sitting, prolonged standing and pushing/pulling.                                    Objective:      LUMBAR:    Posture: slouched sitting but able to self correct without cues    Neurological:    Motor Deficit:  Myotomes L R   L1-2 (hip flexion) 5/5 /5   L3 (knee extension) 5/5 5/5   L4 (ankle DF) 5/5 5/5   L5 (g. toe ext)     S1 (ankle PF or knee flex) 5/5 5/5   B hip abd and ext: 4/5 each bilaterally    Sensory Deficit, Reflexes: normal light touch sensation, reports paresthesias following L5-S1 dermatomes with occasional perineal symptoms    Dural Signs:   L R   Slump neg neg   SLR neg neg   Other:     AROM: (Major, Moderate, Minimal or Nil loss)  Movement Loss Tom Mod Min Nil Pain   Flexion   x  +   Extension    x    Side Gliding L    x    Side Gliding R    x      Repeated movement testing:   (During: produces, abolishes, increases, decreases, no effect, centralizing, peripheralizing; After: better, worse, no better, no worse, no effect, centralized, peripheralized)    Pre-test Symptoms Standing:    Symptoms During Symptoms After ROM increased ROM decreased No Effect   FIS x       Rep FIS x    same   EIS     x   Rep EIS     x     If required Pre-test Symptoms:    Symptoms During Symptoms After ROM increased ROM decreased No Effect   SGIS - L     x   Rep SGIS - L     x   SGIS - R     x   Rep SGIS - R     x     Static Tests: spring  testing negative    With extension preference plan to progress extension positional exercises, core strengthening encourage painfree aerobic activity, posture during sitting, driving, lifting.        System    Physical Exam    General     ROS    Assessment/Plan:    Patient is a 40 year old female with lumbar complaints.    Patient has the following significant findings with corresponding treatment plan.                Diagnosis 1:  LBP  Pain -  hot/cold therapy, education, directional preference exercise and home program  Decreased ROM/flexibility - manual therapy, therapeutic exercise, therapeutic activity and home program  Decreased strength - therapeutic exercise, therapeutic activities and home program    Cumulative Therapy Evaluation is: Low complexity.    Previous and current functional limitations:  (See Goal Flow Sheet for this information)    Short term and Long term goals: (See Goal Flow Sheet for this information)     Communication ability:  Patient appears to be able to clearly communicate and understand verbal and written communication and follow directions correctly.  Treatment Explanation - The following has been discussed with the patient:   RX ordered/plan of care  Anticipated outcomes  Possible risks and side effects  This patient would benefit from PT intervention to resume normal activities.   Rehab potential is good.    Frequency:  1 X week, once daily  Duration:  for 10 weeks  Discharge Plan:  Achieve all LTG.  Independent in home treatment program.  Reach maximal therapeutic benefit.    Please refer to the daily flowsheet for treatment today, total treatment time and time spent performing 1:1 timed codes.

## 2020-07-31 ENCOUNTER — THERAPY VISIT (OUTPATIENT)
Dept: PHYSICAL THERAPY | Facility: CLINIC | Age: 40
End: 2020-07-31
Payer: COMMERCIAL

## 2020-07-31 DIAGNOSIS — M54.41 BILATERAL LOW BACK PAIN WITH RIGHT-SIDED SCIATICA: ICD-10-CM

## 2020-07-31 PROCEDURE — 97110 THERAPEUTIC EXERCISES: CPT | Mod: GP | Performed by: PHYSICAL THERAPIST

## 2020-08-06 ENCOUNTER — THERAPY VISIT (OUTPATIENT)
Dept: PHYSICAL THERAPY | Facility: CLINIC | Age: 40
End: 2020-08-06
Payer: COMMERCIAL

## 2020-08-06 DIAGNOSIS — M54.41 BILATERAL LOW BACK PAIN WITH RIGHT-SIDED SCIATICA: ICD-10-CM

## 2020-08-06 PROCEDURE — 97112 NEUROMUSCULAR REEDUCATION: CPT | Mod: GP | Performed by: PHYSICAL THERAPIST

## 2020-08-06 PROCEDURE — 97140 MANUAL THERAPY 1/> REGIONS: CPT | Mod: GP | Performed by: PHYSICAL THERAPIST

## 2020-08-06 PROCEDURE — 97110 THERAPEUTIC EXERCISES: CPT | Mod: GP | Performed by: PHYSICAL THERAPIST

## 2020-09-03 ENCOUNTER — THERAPY VISIT (OUTPATIENT)
Dept: PHYSICAL THERAPY | Facility: CLINIC | Age: 40
End: 2020-09-03
Payer: COMMERCIAL

## 2020-09-03 DIAGNOSIS — M54.41 BILATERAL LOW BACK PAIN WITH RIGHT-SIDED SCIATICA: ICD-10-CM

## 2020-09-03 PROCEDURE — 97110 THERAPEUTIC EXERCISES: CPT | Mod: GP | Performed by: PHYSICAL THERAPIST

## 2020-09-03 PROCEDURE — 97140 MANUAL THERAPY 1/> REGIONS: CPT | Mod: GP | Performed by: PHYSICAL THERAPIST

## 2020-09-11 ENCOUNTER — VIRTUAL VISIT (OUTPATIENT)
Dept: PSYCHOLOGY | Facility: CLINIC | Age: 40
End: 2020-09-11
Payer: COMMERCIAL

## 2020-09-11 DIAGNOSIS — F41.1 GAD (GENERALIZED ANXIETY DISORDER): Primary | ICD-10-CM

## 2020-09-11 PROCEDURE — 90791 PSYCH DIAGNOSTIC EVALUATION: CPT | Mod: TEL | Performed by: SOCIAL WORKER

## 2020-09-11 ASSESSMENT — ANXIETY QUESTIONNAIRES
7. FEELING AFRAID AS IF SOMETHING AWFUL MIGHT HAPPEN: MORE THAN HALF THE DAYS
1. FEELING NERVOUS, ANXIOUS, OR ON EDGE: NEARLY EVERY DAY
2. NOT BEING ABLE TO STOP OR CONTROL WORRYING: MORE THAN HALF THE DAYS
GAD7 TOTAL SCORE: 13
3. WORRYING TOO MUCH ABOUT DIFFERENT THINGS: MORE THAN HALF THE DAYS
4. TROUBLE RELAXING: MORE THAN HALF THE DAYS
6. BECOMING EASILY ANNOYED OR IRRITABLE: SEVERAL DAYS
5. BEING SO RESTLESS THAT IT IS HARD TO SIT STILL: SEVERAL DAYS

## 2020-09-11 ASSESSMENT — COLUMBIA-SUICIDE SEVERITY RATING SCALE - C-SSRS
5. HAVE YOU STARTED TO WORK OUT OR WORKED OUT THE DETAILS OF HOW TO KILL YOURSELF? DO YOU INTEND TO CARRY OUT THIS PLAN?: NO
2. HAVE YOU ACTUALLY HAD ANY THOUGHTS OF KILLING YOURSELF?: NO
2. HAVE YOU ACTUALLY HAD ANY THOUGHTS OF KILLING YOURSELF LIFETIME?: NO
TOTAL  NUMBER OF INTERRUPTED ATTEMPTS LIFETIME: NO
6. HAVE YOU EVER DONE ANYTHING, STARTED TO DO ANYTHING, OR PREPARED TO DO ANYTHING TO END YOUR LIFE?: NO
ATTEMPT PAST THREE MONTHS: NO
ATTEMPT LIFETIME: NO
TOTAL  NUMBER OF INTERRUPTED ATTEMPTS PAST 3 MONTHS: NO
4. HAVE YOU HAD THESE THOUGHTS AND HAD SOME INTENTION OF ACTING ON THEM?: NO
1. IN THE PAST MONTH, HAVE YOU WISHED YOU WERE DEAD OR WISHED YOU COULD GO TO SLEEP AND NOT WAKE UP?: NO
3. HAVE YOU BEEN THINKING ABOUT HOW YOU MIGHT KILL YOURSELF?: NO
4. HAVE YOU HAD THESE THOUGHTS AND HAD SOME INTENTION OF ACTING ON THEM?: NO
6. HAVE YOU EVER DONE ANYTHING, STARTED TO DO ANYTHING, OR PREPARED TO DO ANYTHING TO END YOUR LIFE?: NO
1. IN THE PAST MONTH, HAVE YOU WISHED YOU WERE DEAD OR WISHED YOU COULD GO TO SLEEP AND NOT WAKE UP?: NO
TOTAL  NUMBER OF ABORTED OR SELF INTERRUPTED ATTEMPTS PAST 3 MONTHS: NO
TOTAL  NUMBER OF ABORTED OR SELF INTERRUPTED ATTEMPTS PAST LIFETIME: NO
5. HAVE YOU STARTED TO WORK OUT OR WORKED OUT THE DETAILS OF HOW TO KILL YOURSELF? DO YOU INTEND TO CARRY OUT THIS PLAN?: NO

## 2020-09-11 ASSESSMENT — PATIENT HEALTH QUESTIONNAIRE - PHQ9
SUM OF ALL RESPONSES TO PHQ QUESTIONS 1-9: 0
10. IF YOU CHECKED OFF ANY PROBLEMS, HOW DIFFICULT HAVE THESE PROBLEMS MADE IT FOR YOU TO DO YOUR WORK, TAKE CARE OF THINGS AT HOME, OR GET ALONG WITH OTHER PEOPLE: NOT DIFFICULT AT ALL
SUM OF ALL RESPONSES TO PHQ QUESTIONS 1-9: 0
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
## 2020-09-12 ASSESSMENT — PATIENT HEALTH QUESTIONNAIRE - PHQ9: SUM OF ALL RESPONSES TO PHQ QUESTIONS 1-9: 0

## 2020-09-12 ASSESSMENT — ANXIETY QUESTIONNAIRES: GAD7 TOTAL SCORE: 13

## 2020-09-15 ENCOUNTER — MYC MEDICAL ADVICE (OUTPATIENT)
Dept: FAMILY MEDICINE | Facility: CLINIC | Age: 40
End: 2020-09-15

## 2020-09-15 ENCOUNTER — VIRTUAL VISIT (OUTPATIENT)
Dept: FAMILY MEDICINE | Facility: CLINIC | Age: 40
End: 2020-09-15
Payer: COMMERCIAL

## 2020-09-15 DIAGNOSIS — H10.12 ALLERGIC CONJUNCTIVITIS, LEFT: Primary | ICD-10-CM

## 2020-09-15 DIAGNOSIS — B37.2 CANDIDAL INTERTRIGO: ICD-10-CM

## 2020-09-15 DIAGNOSIS — L73.9 FOLLICULITIS: Primary | ICD-10-CM

## 2020-09-15 PROCEDURE — 99213 OFFICE O/P EST LOW 20 MIN: CPT | Mod: 95 | Performed by: NURSE PRACTITIONER

## 2020-09-15 RX ORDER — POLYMYXIN B SULFATE AND TRIMETHOPRIM 1; 10000 MG/ML; [USP'U]/ML
SOLUTION OPHTHALMIC
Qty: 1 BOTTLE | Refills: 1 | Status: SHIPPED | OUTPATIENT
Start: 2020-09-15 | End: 2020-10-06

## 2020-09-15 RX ORDER — NYSTATIN 100000 U/G
CREAM TOPICAL
Qty: 30 G | Refills: 0 | Status: SHIPPED | OUTPATIENT
Start: 2020-09-15 | End: 2020-09-28

## 2020-09-15 ASSESSMENT — ENCOUNTER SYMPTOMS
PHOTOPHOBIA: 0
EYE REDNESS: 1
EYE DISCHARGE: 1
RHINORRHEA: 1
EYE ITCHING: 1
EYE PAIN: 0

## 2020-09-15 NOTE — PROGRESS NOTES
"Monet You is a 40 year old female who is being evaluated via a billable video visit.      The patient has been notified of following:     \"This video visit will be conducted via a call between you and your physician/provider. We have found that certain health care needs can be provided without the need for an in-person physical exam.  This service lets us provide the care you need with a video conversation.  If a prescription is necessary we can send it directly to your pharmacy.  If lab work is needed we can place an order for that and you can then stop by our lab to have the test done at a later time.    Video visits are billed at different rates depending on your insurance coverage.  Please reach out to your insurance provider with any questions.    If during the course of the call the physician/provider feels a video visit is not appropriate, you will not be charged for this service.\"    Patient has given verbal consent for Video visit? Yes  How would you like to obtain your AVS? MyChart  If you are dropped from the video visit, the video invite should be resent to: Text to cell phone: [phone]  Will anyone else be joining your video visit? No    Subjective     Monet You is a 40 year old female who presents today via video visit for the following health issues:    HPI    Acute Illness  Acute illness concerns: Left eye crusted shut  Onset/Duration: 1 day  Symptoms:  Fever: no  Chills/Sweats: no  Headache (location?): no  Sinus Pressure: YES- post nasal drip  Conjunctivitis:  YES- Left eye crusted shut  Ear Pain: no  Rhinorrhea: YES  Congestion: no  Sore Throat: no  Cough: YES-non-productive  Wheeze: no  Decreased Appetite: no  Nausea: no  Vomiting: no  Diarrhea: no  Dysuria/Freq.: no  Dysuria or Hematuria: no  Fatigue/Achiness: no  Sick/Strep Exposure: no  Therapies tried and outcome: None       Video Start Time: 8:50 AM    Video visit completed due to COVID-19 outbreak.     This " AM left eye was swollen. Did have some crust that kept is shut. Left eye was itching. Does not wear contacts. Does get blurred and then when clears the drainage will be okay. Right eye is okay. Has not been around anyone else that is aware of. Has had runny nose and sneezing. No fever. Is getting COVID testing later today per her employee health.  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    Has a rash under armpits. It is itching. Has been using triamcinolone cream and that does help to relieve some of the itching and redness but the rash stays. Thinks is [place] Did stop shaving. Is the same deodorant that has had since Jan.       Review of Systems   HENT: Positive for rhinorrhea.    Eyes: Positive for discharge (left eye), redness and itching (left eye). Negative for photophobia, pain and visual disturbance.   Skin: Positive for rash (under armpits).         Objective           Vitals:  No vitals were obtained today due to virtual visit.    Physical Exam  Constitutional:       Appearance: Normal appearance.   HENT:      Nose: No congestion.   Eyes:      General: Lids are normal.      Conjunctiva/sclera:      Left eye: Left conjunctiva is injected.      Comments: Left upper eyelid slightly swollen.   Pulmonary:      Effort: No tachypnea, bradypnea or respiratory distress.   Skin:     Coloration: Skin is not ashen, cyanotic, jaundiced or pale.      Findings: Rash present.      Comments: Rash to bilateral axilla.  Difficult to discern via video.   Neurological:      Mental Status: She is alert.   Psychiatric:         Mood and Affect: Mood normal.         Speech: Speech normal.         Behavior: Behavior normal.                    Assessment & Plan     Allergic conjunctivitis, left  Use warm washcloth to clean discharge from eyes. Apply ointment or drops as prescribed until clear of matter for 48 hours.   Discussed contagiousness  Enc good handwashing  -  trimethoprim-polymyxin b (POLYTRIM) 65887-0.1 UNIT/ML-% ophthalmic solution; Apply 1 drop in left eye three times per day. Use for 2 days after the redness is gone. Typically 5-7 days total.    Candidal intertrigo  Educated on use of cream.  - nystatin (MYCOSTATIN) 495219 UNIT/GM external cream; Apply to affected area 2 times per day until the rash is resolved.    We will treat rash today as possible yeast.  Difficult to discern rash via video.  Notify if no improvement in rash over the next week and will change focus, consider folliculitis and possibly treat with Keflex.       No follow-ups on file.    HIRA Mcgee CNP  [facility]      Video-Visit Details    Type of service:  Video Visit    Video End Time:9:03 AM    Originating Location (pt. Location): Home    Distant Location (provider location):  [facility]     Platform used for Video Visit: [email or web address]

## 2020-09-17 RX ORDER — DICLOXACILLIN SODIUM 250 MG
250 CAPSULE ORAL 4 TIMES DAILY
Qty: 40 CAPSULE | Refills: 0 | Status: SHIPPED | OUTPATIENT
Start: 2020-09-17 | End: 2020-09-18

## 2020-09-18 ENCOUNTER — TELEPHONE (OUTPATIENT)
Dept: FAMILY MEDICINE | Facility: CLINIC | Age: 40
End: 2020-09-18

## 2020-09-18 ENCOUNTER — VIRTUAL VISIT (OUTPATIENT)
Dept: PSYCHOLOGY | Facility: CLINIC | Age: 40
End: 2020-09-18
Payer: COMMERCIAL

## 2020-09-18 ENCOUNTER — MYC MEDICAL ADVICE (OUTPATIENT)
Dept: FAMILY MEDICINE | Facility: CLINIC | Age: 40
End: 2020-09-18

## 2020-09-18 DIAGNOSIS — L73.9 FOLLICULITIS: Primary | ICD-10-CM

## 2020-09-18 DIAGNOSIS — F41.1 GAD (GENERALIZED ANXIETY DISORDER): Primary | ICD-10-CM

## 2020-09-18 PROCEDURE — 90834 PSYTX W PT 45 MINUTES: CPT | Mod: TEL | Performed by: SOCIAL WORKER

## 2020-09-18 RX ORDER — DOXYCYCLINE 100 MG/1
100 CAPSULE ORAL 2 TIMES DAILY
Qty: 20 CAPSULE | Refills: 0 | Status: SHIPPED | OUTPATIENT
Start: 2020-09-18 | End: 2020-09-28

## 2020-09-18 ASSESSMENT — ANXIETY QUESTIONNAIRES
5. BEING SO RESTLESS THAT IT IS HARD TO SIT STILL: SEVERAL DAYS
3. WORRYING TOO MUCH ABOUT DIFFERENT THINGS: MORE THAN HALF THE DAYS
2. NOT BEING ABLE TO STOP OR CONTROL WORRYING: MORE THAN HALF THE DAYS
1. FEELING NERVOUS, ANXIOUS, OR ON EDGE: MORE THAN HALF THE DAYS
7. FEELING AFRAID AS IF SOMETHING AWFUL MIGHT HAPPEN: SEVERAL DAYS
GAD7 TOTAL SCORE: 10
4. TROUBLE RELAXING: SEVERAL DAYS
6. BECOMING EASILY ANNOYED OR IRRITABLE: SEVERAL DAYS

## 2020-09-18 ASSESSMENT — PATIENT HEALTH QUESTIONNAIRE - PHQ9: SUM OF ALL RESPONSES TO PHQ QUESTIONS 1-9: 6

## 2020-09-18 NOTE — TELEPHONE ENCOUNTER
Patient has made multiple attempts to get a response from care team today. States all pharmacies she has contacted do not have dicloxacillin (DYNAPEN) 250 MG capsule. States she is miserable and would like a call ASAP to discuss this. She would like this medication sent elsewhere, or have the dosage changed so she can pick it up at a local pharmacy. Emely Kelly TC/Pt Rep

## 2020-09-18 NOTE — PROGRESS NOTES
"                                             Progress Note    Patient Name: Monet You  Date: 9/18/2020         Service Type: Individual      Session Start Time: 0906  Session End Time: 0956     Session Length: 50    Session #: 1    Attendees: Client    Service Modality:  Phone Visit:    The patient has been notified of the following:      \"We have found that certain health care needs can be provided without the need for a face to face visit.  This service lets us provide the care you need with a phone conversation.       I will have full access to your [place] medical record during this entire phone call.   I will be taking notes for your medical record.      Since this is like an office visit, we will bill your insurance company for this service.       There are potential benefits and risks of telephone visits (e.g. limits to patient confidentiality) that differ from in-person visits.?  Confidentiality still applies for telephone services, and nobody will record the visit.  It is important to be in a quiet, private space that is free of distractions (including cell phone or other devices) during the visit.??      If during the course of the call I believe a telephone visit is not appropriate, you will not be charged for this service\"     Consent has been obtained for this service by care team member: Yes      Treatment Plan Last Reviewed: 9/18/2020  PHQ-9 / HILL-7 : 6/10    DATA  Interactive Complexity: No  Crisis: No       Progress Since Last Session (Related to Symptoms / Goals / Homework):   Symptoms: Worsening increase PHQ-9    Homework: Completed in session      Episode of Care Goals: No improvement - CONTEMPLATION (Considering change and yet undecided); Intervened by assessing the negative and positive thinking (ambivalence) about behavior change     Current / Ongoing Stressors and Concerns:   loss, trauma, stress      Treatment Objective(s) Addressed in This Session:   Patient will demonstrate " and report a level of anxiety that can be managed at a lower level of care.  Absence of persistent anxious mood and report of reduced frequency and intensity of worry and absence of persistent anxious mood to acceptable levels, no panic attacks, report subjective comfort with rumination for a period of 90 days, within 6 months as clinically observed and by patient self-report.  Patient will demonstrate/report improved family dynamics that can be safely managed in a lower LOC.  Client report of able to express feelings of grief and loss regarding death of family member and behavior indicating progression of grief process towards acceptance and coping with reality of loss with acknowledgement of permanent change within 6 months.     Intervention:   Motivational Interviewing    MI Intervention: Co-Developed Goal: anxiety, Expressed Empathy/Understanding, Supported Autonomy, Collaboration, Evocation, Permission to raise concern or advise, Open-ended questions and Reflections: simple and complex     Change Talk Expressed by the Patient: Desire to change Ability to change Reasons to change Need to change Committment to change Activation    Provider Response to Change Talk: E - Evoked more info from patient about behavior change, A - Affirmed patient's thoughts, decisions, or attempts at behavior change, R - Reflected patient's change talk and S - Summarized patient's change talk statements          ASSESSMENT: Current Emotional / Mental Status (status of significant symptoms):   Risk status (Self / Other harm or suicidal ideation)   Patient denies current fears or concerns for personal safety.   Patient denies current or recent suicidal ideation or behaviors.   Patient denies current or recent homicidal ideation or behaviors.   Patient denies current or recent self injurious behavior or ideation.   Patient denies other safety concerns.   Patient reports there has been no change in risk factors since their last session.      Patient reports there has been no change in protective factors since their last session.     Recommended that patient call 911 or go to the local ED should there be a change in any of these risk factors.     Appearance:   phone session    Eye Contact:   phone session    Psychomotor Behavior: phone session    Attitude:   Cooperative  Interested Friendly   Orientation:   All   Speech    Rate / Production: Emotional Talkative Normal     Volume:  Normal    Mood:    Anxious  Sad    Affect:    Worrisome    Thought Content:  Clear    Thought Form:  Coherent    Insight:    Poor      Medication Review:   No current psychiatric medications prescribed     Medication Compliance:   NA     Changes in Health Issues:   Yes: not feeling well     Chemical Use Review:   Substance Use: Chemical use reviewed, no active concerns identified      Tobacco Use: No current tobacco use.      Diagnosis:  1. HILL (generalized anxiety disorder)        Collateral Reports Completed:   Not Applicable    PLAN: (Patient Tasks / Therapist Tasks / Other)  Manage anxiety in moment   grounding tech    Make list of identity     Sudha Garay, [facility] 9/18/2020                                                         ______________________________________________________________________    Treatment Plan    Patient's Name: Monet You  YOB: 1980    Date: 9/18/2020    DSM5 Diagnoses: 300.02 (F41.1) Generalized Anxiety Disorder  Psychosocial / Contextual Factors: loss, trauma, stress   WHODAS: 18    Referral / Collaboration:  Referral to another professional/service is not indicated at this time..    Anticipated number of session or this episode of care: 20      MeasurableTreatment Goal(s) related to diagnosis / functional impairment(s)  Goal 1: Patient will report absence of persistent anxiety mood and report of reduced frequency and intensity of worry and absence of persistent anxious mood to acceptable levels, no panic attacks,  report subjective comfort with rumination for a period of 90 days. Within 6 months as clinically observed and by patient self-report.    I will know I've met my goal when light and free and not worry about everything.      Objective #A (Patient Action)    Patient will demonstrate and report a level of anxiety that can be managed at a lower level of care.  Absence of persistent anxious mood and report of reduced frequency and intensity of worry and absence of persistent anxious mood to acceptable levels, no panic attacks, report subjective comfort with rumination for a period of 90 days, within 6 months as clinically observed and by patient self-report.  Status: New - Date: 9/18/2020     Intervention(s)  Therapist will provide individual therapy to identify triggers to anxiety, gain feedback on helpful coping tools and thought-reframing techniques, and identify preferred way of being. Tx to include discuss current stressors and interpersonal conflicts and how to cope with these, coaching, diagnostic testing , referral for medication as indicated, use prescribed medication, cognitive restructuring, interpersonal,   family therapy, supportive therapy services.    Goal 2: Patient will able to express feelings of grief and loss regarding death of family member  and behavior indicating progression of grief process towards acceptance and coping with reality of loss with acknowledgement of permanent change  within 6 months    I will know I've met my goal when I won't worry about my own health and death.      Objective #A (Patient Action)    Status: New - Date: 9/18/2020     Patient will demonstrate/report improved family dynamics that can be safely managed in a lower LOC.  Client report of able to express feelings of grief and loss regarding death of family member and behavior indicating progression of grief process towards acceptance and coping with reality of loss with acknowledgement of permanent change within 6  months.    Intervention(s)  Therapist will provide individual therapy to process through grief and loss and to gain effective coping skills. Tx to discuss current stressors and interpersonal conflicts and how to cope with these, coaching, diagnostic testing, referral for medication as indicated, use prescribed medication, cognitive restructuring, interpersonal family therapy, supportive therapy services.      Patient has reviewed and agreed to the above plan.      Sudha Garay, [facility]  September 18, 2020

## 2020-09-19 ENCOUNTER — MYC MEDICAL ADVICE (OUTPATIENT)
Dept: NURSING | Facility: CLINIC | Age: 40
End: 2020-09-19

## 2020-09-19 ENCOUNTER — NURSE TRIAGE (OUTPATIENT)
Dept: NURSING | Facility: CLINIC | Age: 40
End: 2020-09-19

## 2020-09-19 ASSESSMENT — ANXIETY QUESTIONNAIRES: GAD7 TOTAL SCORE: 10

## 2020-09-19 NOTE — TELEPHONE ENCOUNTER
Wanting to know side effects of the antibiotics that she recently prescribed.    Also would like to know more information about her skin infection.    Folliculitis is when hair follicles become inflamed. This skin condition can occur any place on the body where hair grows.       What causes folliculitis?  An infection or irritation can cause this skin condition. Infectious folliculitis is usually caused by Staph aureus. But it may also be caused by other bacteria or fungi. The condition can also happen from a wound or irritation of the skin. Shaving is a common cause.     Symptoms of folliculitis  This skin condition tends to develop quickly. It looks like little pimples on a base of a red, inflamed hair follicles. These bumps may ooze pus. They may also be:    Itchy  Painful  Red  Swollen    Treatment for folliculitis  Treatment depends on the cause of the inflammation. In some cases, this skin condition will go away on its own in a few days. But it may return. Treatment options include:    Warm compress. Putting a warm, wet washcloth on the inflamed skin may help. Don't reuse the same washcloth, because that may spread infection.  Medicine. Many skin (topical) and oral medicines are available. Antibiotics are used for bacterial infections. Antifungal medicines are best for fungal infections.  Good hygiene. Keeping the skin clean can help. Use a clean razor when shaving. Prevent ingrown hairs after shaving. This can reduce folliculitis in the beard area. Avoid any substances that bother your skin.    When to call your healthcare provider  Call your healthcare provider right away if you have any of these:    Fever of 100.4 F (38 C) or higher, or as directed by your healthcare provider  Pain that gets worse  Symptoms that don t get better, or get worse  New symptoms    Vee Palmer RN    Additional Information    Caller has medication question only, adult not sick, and triager answers question    Protocols used:  MEDICATION QUESTION CALL-A-AH

## 2020-09-20 ENCOUNTER — NURSE TRIAGE (OUTPATIENT)
Dept: NURSING | Facility: CLINIC | Age: 40
End: 2020-09-20

## 2020-09-20 ENCOUNTER — VIRTUAL VISIT (OUTPATIENT)
Dept: URGENT CARE | Facility: CLINIC | Age: 40
End: 2020-09-20
Payer: COMMERCIAL

## 2020-09-20 DIAGNOSIS — L73.9 FOLLICULITIS: Primary | ICD-10-CM

## 2020-09-20 PROCEDURE — 99213 OFFICE O/P EST LOW 20 MIN: CPT | Mod: TEL | Performed by: FAMILY MEDICINE

## 2020-09-20 RX ORDER — MUPIROCIN 20 MG/G
OINTMENT TOPICAL 3 TIMES DAILY
Qty: 180 G | Refills: 0 | Status: SHIPPED | OUTPATIENT
Start: 2020-09-20 | End: 2020-09-28

## 2020-09-20 NOTE — TELEPHONE ENCOUNTER
Monet was diagnosed with pink eye and also has bumps under arm pits and has spred and was started on doxycycline and Monet states that she has anxiety and has not been able to take medication yet.  Had covid test and was negative.  Denies fever cough and shortness of breath.  Monet is requesting to see if a cream can be prescribed for rash instead of doxycycline.    COVID 19 Nurse Triage Plan/Patient Instructions    Please be aware that novel coronavirus (COVID-19) may be circulating in the community. If you develop symptoms such as fever, cough, or SOB or if you have concerns about the presence of another infection including coronavirus (COVID-19), please contact your health care provider or visit [email or web address].     Disposition/Instructions    Virtual Visit with provider recommended. Reference Visit Selection Guide.    Thank you for taking steps to prevent the spread of this virus.  o Limit your contact with others.  o Wear a simple mask to cover your cough.  o Wash your hands well and often.    Resources    [facility]: About COVID-19: [email or web address]    CDC: What to Do If You're Sick: www.cdc.gov/coronavirus/2019-ncov/about/steps-when-sick.html    CDC: Ending Home Isolation: www.cdc.gov/coronavirus/2019-ncov/hcp/disposition-in-home-patients.html     CDC: Caring for Someone: www.cdc.gov/coronavirus/2019-ncov/if-you-are-sick/care-for-someone.html     [facility]: Interim Guidance for Hospital Discharge to Home: www.health.[facility].[place]/diseases/coronavirus/hcp/hospdischarge.pdf    [facility] clinical trials (COVID-19 research studies): [email or web address].[email or web address] [place]/umn-clinical-trials     Below are the COVID-19 hotlines at the [place] Department of Health ([facility]). Interpreters are available.   o For health questions: Call 490-784-8800 or 1-296.549.9886 (7 a.m. to 7 p.m.)  o For questions about schools and childcare: Call 010-430-5294 or 1-900.603.3123 (7 a.m. to 7 p.m.)                     "     Reason for Disposition    [1] Request for URGENT new prescription or refill of \"essential\" medication (i.e., likelihood of harm to patient if not taken) AND [2] triager unable to fill per unit policy    Additional Information    Negative: MORE THAN A DOUBLE DOSE of a prescription or over-the-counter (OTC) drug    Negative: [1] DOUBLE DOSE (an extra dose or lesser amount) of over-the-counter (OTC) drug AND [2] any symptoms (e.g., dizziness, nausea, pain, sleepiness)    Negative: [1] DOUBLE DOSE (an extra dose or lesser amount) of prescription drug AND [2] any symptoms (e.g., dizziness, nausea, pain, sleepiness)    Negative: Took another person's prescription drug    Negative: [1] DOUBLE DOSE (an extra dose or lesser amount) of prescription drug AND [2] NO symptoms (Exception: a double dose of antibiotics)    Negative: Diabetes drug error or overdose (e.g., insulin or extra dose)    Protocols used: MEDICATION QUESTION CALL-A-AH      "

## 2020-09-20 NOTE — PROGRESS NOTES
"Monet You is a 40 year old female who is being evaluated via a billable telephone visit.      The patient has been notified of following at scheduling:    Telephone visits are billed at different rates depending on your insurance coverage. During this emergency period, for some insurers they may be billed the same as an in-person visit.  Please reach out to your insurance provider with any questions.\"    Patient has given verbal consent for Telephone visit?  Yes    Subjective     Monet You is a 40 year old female who presents via phone visit today for the following health issues: medication concern - difficulty taking medication prescribed.     HUE Healy has been prescribed doxycycline for folliculitis 2 days ago.  Folliculitis at the time was located in both armpits. Now also has a little bit on back and chest.   She thought the medication would be ok and did pick it up, but she has an oral aversion and has been feeling very anxious and has not been able to start taking the medications as planned. She is wondering what her options might be to either learn more about the doxycycline medication or to be prescribed an alternative.   She has no fever or other systemic symptoms, but her rash is spreading which is also concerning.     Does have a history of psoriatic type skin rash, but this is different.       Review of Systems   See above       Objective          Vitals:  No vitals were obtained today due to virtual visit.    healthy, alert and no distress  PSYCH: Alert and oriented times 3; coherent speech, normal   rate and volume, able to articulate logical thoughts, able   to abstract reason, no tangential thoughts, no hallucinations   or delusions  Her affect is normal  RESP: No cough, no audible wheezing, able to talk in full sentences  Remainder of exam unable to be completed due to telephone visits            Assessment/Plan:    Assessment & Plan     Folliculitis: Given her " folliculitis without systemic symptoms, I think it's very reasonable to try a topical ointment which is also considered first line treatment for folliculitis rather than trying to use pills that are causing her unnecessary distress. She can do mupirocin three times a day and can also use a low-concentration bleach bath daily (Half of a cup of household bleach in a full tub for 10 min daily). If she is seeing no improvement in 3 days or if she is getting worse, I do recommend doing the doxycycline. We discussed that doxycycline is safe and effective (though unfortunately knowing that is not a terribly helpful thing for an oral aversion so she will also discuss strategies for taking medications in the future with her therapist).   - mupirocin (BACTROBAN) 2 % external ointment; Apply topically 3 times daily     Allie Albrecht MD  Virtual Urgent Care  [facility] VIRTUAL URGENT CARE    Phone call duration:  8 minutes

## 2020-09-20 NOTE — PATIENT INSTRUCTIONS
Bleach Bath INstructions:        If properly diluted and used as directed, a bleach bath is safe for children and adults. For best results:    Add 1/4 cup (about 59 milliliters) to 1/2 cup (about 118 milliliters) of bleach to a 40-gallon (about 151-liter) bathtub filled with warm water. Measures are for a [place] standard-sized tub filled to the overflow drainage holes. Use household bleach and read the product label. In the [place], bleach products may contain 6 percent to 8.25 percent sodium hypochlorite, the Environmental Protection Agency says. If the concentration of sodium hypochlorite is at the higher end of that range, use less than a 1/2 cup of bleach.    Soak from the neck down or just the affected areas of skin for about 10 minutes.    Rinse if your skin doesn't tolerate the bleach bath well. Gently pat dry with a towel.    Immediately apply moisturizer generously.    Don't routinely take a bleach bath more than three times a week (ok to do daily for the first several days as we discussed).    You may experience dry skin if you use too much bleach or take bleach baths too often. If your skin is cracked or very dry, any bath -- including a bleach bath -- may be painful.

## 2020-09-25 ENCOUNTER — OFFICE VISIT (OUTPATIENT)
Dept: FAMILY MEDICINE | Facility: CLINIC | Age: 40
End: 2020-09-25
Payer: COMMERCIAL

## 2020-09-25 VITALS
SYSTOLIC BLOOD PRESSURE: 122 MMHG | WEIGHT: 121 LBS | DIASTOLIC BLOOD PRESSURE: 81 MMHG | OXYGEN SATURATION: 99 % | BODY MASS INDEX: 20.66 KG/M2 | HEIGHT: 64 IN | HEART RATE: 94 BPM

## 2020-09-25 DIAGNOSIS — L30.9 DERMATITIS: Primary | ICD-10-CM

## 2020-09-25 PROCEDURE — 99213 OFFICE O/P EST LOW 20 MIN: CPT | Performed by: PREVENTIVE MEDICINE

## 2020-09-25 RX ORDER — TRIAMCINOLONE ACETONIDE 5 MG/G
OINTMENT TOPICAL
Qty: 30 G | Refills: 0 | Status: SHIPPED | OUTPATIENT
Start: 2020-09-25 | End: 2020-11-27

## 2020-09-25 ASSESSMENT — PAIN SCALES - GENERAL: PAINLEVEL: NO PAIN (0)

## 2020-09-25 ASSESSMENT — MIFFLIN-ST. JEOR: SCORE: 1193.47

## 2020-09-25 NOTE — PROGRESS NOTES
"Subjective     Monet You is a 40 year old female who presents to clinic today for the following health issues:    HPI       Rash  Onset/Duration: a few weeks  Description  Location: under arms and hips  Character: burning, red  Itching: moderate  Intensity:  moderate  Progression of Symptoms:  improving and same  Accompanying signs and symptoms:   Fever: no  Body aches or joint pain: no  Sore throat symptoms: no  Recent cold symptoms: no  History:           Previous episodes of similar rash: yes  New exposures:  None  Recent travel: no  Exposure to similar rash: no  Precipitating or alleviating factors: none  Therapies tried and outcome: Bactroban 2%   Started in the armpit and thought it was razor burn  Got pink eye and took drops for that  Does itch and burn  No drainage  No past history   Bactroban helped some but has not gotten rid of the rash    Virtual visit done 9/20/2020 and started on medication for folliculitis   Has also been prescribed Doxycycline for folliculitis but did not start the medication as was anxious about taking an oral medication.  Very reluctant to take any sort of oral medication due to side effect with a previous medication, hesitant to even take Ibuprofen over the counter.       Review of Systems   Constitutional, HEENT, cardiovascular, pulmonary, gi and gu systems are negative, except as otherwise noted.      Objective    /81 (BP Location: Left arm, Patient Position: Chair, Cuff Size: Adult Regular)   Pulse 94   Ht 1.617 m (5' 3.66\")   Wt 54.9 kg (121 lb)   SpO2 99%   BMI 20.99 kg/m    Body mass index is 20.99 kg/m .  Physical Exam   GENERAL APPEARANCE: healthy, alert and no distress  EYES: Eyes grossly normal to inspection and conjunctivae and sclerae normal  RESP: lungs clear to auscultation - no rales, rhonchi or wheezes  CV: regular rates and rhythm, normal S1 S2, no S3 or S4 and no murmur, click or rub  MS: extremities normal- no gross deformities noted and " peripheral pulses normal  SKIN: erythematous papules noted in bilateral axillae and a few lesions on the outer hips, no drainage, no blisters, no pus points.   NEURO: Normal strength and tone, mentation intact and speech normal  PSYCH: mentation appears anxious      No results found for this or any previous visit (from the past 24 hour(s)).        Assessment & Plan     Monet was seen today for derm problem.    Diagnoses and all orders for this visit:    Dermatitis  -     triamcinolone (KENALOG) 0.5 % external ointment; Apply to the affected priti 2 times a day for a maximum of 2 weeks.  -     DERMATOLOGY ADULT REFERRAL; Future  -does not want to take oral medication   -if not better in 2 weeks then please follow up with Dermatology for further evaluation     Will get Flu vaccine at work       Return in about 2 weeks (around 10/9/2020), or if symptoms worsen or fail to improve.    Alma Ladd MD MPH    [facility]

## 2020-09-25 NOTE — PATIENT INSTRUCTIONS
At [facility], we strive to deliver an exceptional experience to you, every time we see you. If you receive a survey, please complete it as we do value your feedback.  If you have MyChart, you can expect to receive results automatically within 24 hours of their completion.  Your provider will send a note interpreting your results as well.   If you do not have MyChart, you should receive your results in about a week by mail.    Your care team:                            Family Medicine Internal Medicine   MD Bruno Guerrero MD Shantel Branch-Fleming, MD Srinivasa Vaka, MD Katya Georgiev PA-C Megan Hill, APRN CNP    Juvenal Chance, MD Pediatrics   Esequiel Joseph, PANeyC  June Diane, CNP MD Dejah Smart APRN CNP   MD Patricia Waggoner MD Deborah Mielke, MD Malinda Jimenez, APRN CNP  Sophy Casper, PANeyC  Bianka Quiros, CNP  MD Alpa Mcgrath MD Angela Wermerskirchen, MD      Clinic hours: Monday - Thursday 7 am-7 pm; Fridays 7 am-5 pm.   Urgent care: Monday - Friday 11 am-9 pm; Saturday and Sunday 9 am-5 pm.    Clinic: [phone]       [facility]: Monday - Thursday 8 am - 7 pm; Friday 8 am - 6 pm  [facility]: [phone]     Use [email or web address] for 24/7 diagnosis and treatment of dozens of conditions.

## 2020-09-28 ENCOUNTER — VIRTUAL VISIT (OUTPATIENT)
Dept: FAMILY MEDICINE | Facility: CLINIC | Age: 40
End: 2020-09-28
Payer: COMMERCIAL

## 2020-09-28 ENCOUNTER — MYC MEDICAL ADVICE (OUTPATIENT)
Dept: FAMILY MEDICINE | Facility: CLINIC | Age: 40
End: 2020-09-28

## 2020-09-28 DIAGNOSIS — H10.32 ACUTE BACTERIAL CONJUNCTIVITIS OF LEFT EYE: ICD-10-CM

## 2020-09-28 DIAGNOSIS — H10.12 ALLERGIC CONJUNCTIVITIS, LEFT: Primary | ICD-10-CM

## 2020-09-28 DIAGNOSIS — F41.1 GAD (GENERALIZED ANXIETY DISORDER): ICD-10-CM

## 2020-09-28 DIAGNOSIS — R21 RASH: Primary | ICD-10-CM

## 2020-09-28 PROCEDURE — 99214 OFFICE O/P EST MOD 30 MIN: CPT | Mod: TEL | Performed by: NURSE PRACTITIONER

## 2020-09-28 NOTE — PATIENT INSTRUCTIONS
Patient Education     Buspirone Hydrochloride Oral tablet  What is this medicine?  BUSPIRONE (bylorelei hardin) is used to treat anxiety disorders.  This medicine may be used for other purposes; ask your health care provider or pharmacist if you have questions.  What should I tell my health care provider before I take this medicine?  They need to know if you have any of these conditions:    kidney or liver disease    an unusual or allergic reaction to buspirone, other medicines, foods, dyes, or preservatives    pregnant or trying to get pregnant    breast-feeding  How should I use this medicine?  Take this medicine by mouth with a glass of water. Follow the directions on the prescription label. You may take this medicine with or without food. To ensure that this medicine always works the same way for you, you should take it either always with or always without food. Take your doses at regular intervals. Do not take your medicine more often than directed. Do not stop taking except on the advice of your doctor or health care professional.  Talk to your pediatrician regarding the use of this medicine in children. Special care may be needed.  Overdosage: If you think you have taken too much of this medicine contact a poison control center or emergency room at once.  NOTE: This medicine is only for you. Do not share this medicine with others.  What if I miss a dose?  If you miss a dose, take it as soon as you can. If it is almost time for your next dose, take only that dose. Do not take double or extra doses.  What may interact with this medicine?  Do not take this medicine with any of the following medications:    linezolid    MAOIs like Carbex, Eldepryl, Marplan, Nardil, and Parnate    methylene blue    procarbazine  This medicine may also interact with the following medications:    diazepam    digoxin    diltiazem    erythromycin    grapefruit juice    haloperidol    medicines for mental depression or mood  problems    medicines for seizures like carbamazepine, phenobarbital and phenytoin    nefazodone    other medications for anxiety    rifampin    ritonavir    some antifungal medicines like itraconazole, ketoconazole, and voriconazole    verapamil    warfarin  This list may not describe all possible interactions. Give your health care provider a list of all the medicines, herbs, non-prescription drugs, or dietary supplements you use. Also tell them if you smoke, drink alcohol, or use illegal drugs. Some items may interact with your medicine.  What should I watch for while using this medicine?  Visit your doctor or health care professional for regular checks on your progress. It may take 1 to 2 weeks before your anxiety gets better.  You may get drowsy or dizzy. Do not drive, use machinery, or do anything that needs mental alertness until you know how this drug affects you. Do not stand or sit up quickly, especially if you are an older patient. This reduces the risk of dizzy or fainting spells. Alcohol can make you more drowsy and dizzy. Avoid alcoholic drinks.  What side effects may I notice from receiving this medicine?  Side effects that you should report to your doctor or health care professional as soon as possible:    blurred vision or other vision changes    chest pain    confusion    difficulty breathing    feelings of hostility or anger    muscle aches and pains    numbness or tingling in hands or feet    ringing in the ears    skin rash and itching    vomiting    weakness  Side effects that usually do not require medical attention (report to your doctor or health care professional if they continue or are bothersome):    disturbed dreams, nightmares    headache    nausea    restlessness or nervousness    sore throat and nasal congestion    stomach upset  This list may not describe all possible side effects. Call your doctor for medical advice about side effects. You may report side effects to FDA at  1-800-FDA-1088.  Where should I keep my medicine?  Keep out of the reach of children.  Store at room temperature below 30 degrees C (86 degrees F). Protect from light. Keep container tightly closed. Throw away any unused medicine after the expiration date.  NOTE:This sheet is a summary. It may not cover all possible information. If you have questions about this medicine, talk to your doctor, pharmacist, or health care provider. Copyright  2016 Gold Standard

## 2020-09-28 NOTE — PROGRESS NOTES
"Monet You is a 40 year old female who is being evaluated via a billable telephone visit.      The patient has been notified of following:     \"This telephone visit will be conducted via a call between you and your physician/provider. We have found that certain health care needs can be provided without the need for a physical exam.  This service lets us provide the care you need with a short phone conversation.  If a prescription is necessary we can send it directly to your pharmacy.  If lab work is needed we can place an order for that and you can then stop by our lab to have the test done at a later time.    Telephone visits are billed at different rates depending on your insurance coverage. During this emergency period, for some insurers they may be billed the same as an in-person visit.  Please reach out to your insurance provider with any questions.    If during the course of the call the physician/provider feels a telephone visit is not appropriate, you will not be charged for this service.\"    Patient has given verbal consent for Telephone visit?  Yes    What phone number would you like to be contacted at? [phone]    How would you like to obtain your AVS? MyChart    Subjective     Monet You is a 40 year old female who presents via phone visit today for the following health issues:    HPI      Chief Complaint   Patient presents with     Eye Problem     see telephone visit from today      Phone call visit completed due to COVID-19 outbreak.      Continues to have a rash. Was told that it looked more like contact dermatitis. Used triamcinolone cream on Sat and got very anxious after used it so has not used it recently. The more stressed out is, the rash is worse. Wondering if stress induced rash.      Today left eye was crusty and white and milky this AM. Was very red. See uploaded pic. No vision changes. Does itch. Vision gets blurred when is full of drainage. No pain to eye or " behind eye. No fevers or chills. Took 4 days of previous drops and then got a rash to abdomen and thought could be a possible reaction from eyedrops so stopped using eyedrops.  Did not have any rash around eye.  Has been referred to dermatology but has not made appointment.     Does not take anything for anxiety. Has been on prozac in the past and made her have thoughts of wanting her harm her self and was in a really dark place.  Very concerned about starting a different oral medication for anxiety.  Previous reaction to Prozac has increased anxiety and has made it difficult for her to take any oral medications for any reason.  Also with extreme anxiety has not been able to eat.  Has concerns that may have allergic reaction to foods that is eating.  Has been losing weight-others have noticed weight loss.  Did start speaking with a therapist, had 2 sessions and next session was scheduled 1+ month out.  Does have employee assistance program available through employer.  Plans to reach out to them. Did have a bad 2019-with a lot of losses.   Thinks that is what really started to trigger anxiety.  Has anxiety for years but has always been able to control it.    Review of Systems   Eyes: Positive for discharge (left), redness (left) and itching (left).   Skin: Positive for rash.   Psychiatric/Behavioral: The patient is nervous/anxious.           Objective          Vitals:  No vitals were obtained today due to virtual visit.    healthy, alert and crying  PSYCH: Alert and oriented times 3; coherent speech, normal   rate and volume, able to articulate logical thoughts, able   to abstract reason, no tangential thoughts, no hallucinations   or delusions  Her affect is anxious, tearful and fearful  RESP: No cough, no audible wheezing, able to talk in full sentences  Remainder of exam unable to be completed due to telephone visits        Assessment/Plan:    Assessment & Plan     Rash  Encouraged to follow-up with  dermatology.  Encouraged to stop all other over-the-counter treatments including tea tree oil and other lotions.    Acute bacterial conjunctivitis of left eye  Previously given refill on Polytrim drops.  Encouraged to fill drops and take full course.    HILL (generalized anxiety disorder)  Majority of visit spent discussing anxiety.  Encouraged to continue speaking with counselor/therapist.  Encouraged to reach out to employee assistance program.  Would recommend starting on daily medication such as BuSpar.  Information given about BuSpar.  Encouraged to notify if would like to start and will send prescription.           No follow-ups on file.    HIRA Mcge[facility]    Phone call duration:  27 minutes

## 2020-09-28 NOTE — TELEPHONE ENCOUNTER
I actually just filled an alternate antibiotic for this pt.  I never had a visit with her.  If she is having problems with an eye that did not resolve with initial treatment I would recommend she be seen in clinic for eval this week.  Please help her to schedule    Cynthia Scott, DO

## 2020-09-29 ASSESSMENT — ENCOUNTER SYMPTOMS
NERVOUS/ANXIOUS: 1
EYE DISCHARGE: 1
EYE ITCHING: 1
EYE REDNESS: 1

## 2020-09-30 RX ORDER — TOBRAMYCIN AND DEXAMETHASONE 3; 1 MG/ML; MG/ML
SUSPENSION/ DROPS OPHTHALMIC
Qty: 1 BOTTLE | Refills: 1 | Status: SHIPPED | OUTPATIENT
Start: 2020-09-30 | End: 2020-10-06

## 2020-10-03 ENCOUNTER — OFFICE VISIT (OUTPATIENT)
Dept: URGENT CARE | Facility: URGENT CARE | Age: 40
End: 2020-10-03
Payer: COMMERCIAL

## 2020-10-03 VITALS
HEART RATE: 91 BPM | WEIGHT: 125 LBS | DIASTOLIC BLOOD PRESSURE: 83 MMHG | HEIGHT: 62 IN | BODY MASS INDEX: 23 KG/M2 | TEMPERATURE: 98.4 F | OXYGEN SATURATION: 99 % | SYSTOLIC BLOOD PRESSURE: 118 MMHG

## 2020-10-03 DIAGNOSIS — B37.2 CANDIDIASIS OF SKIN: Primary | ICD-10-CM

## 2020-10-03 PROCEDURE — 99213 OFFICE O/P EST LOW 20 MIN: CPT | Performed by: FAMILY MEDICINE

## 2020-10-03 RX ORDER — FLUCONAZOLE 150 MG/1
150 TABLET ORAL DAILY
Qty: 3 TABLET | Refills: 0 | Status: SHIPPED | OUTPATIENT
Start: 2020-10-03 | End: 2020-10-06

## 2020-10-03 ASSESSMENT — MIFFLIN-ST. JEOR: SCORE: 1185.25

## 2020-10-03 NOTE — PROGRESS NOTES
SUBJECTIVE:  Monet You is a 40 year old female who presents to the clinic today for a rash.  Onset of rash was 2 week(s) ago.  Multiple treatments over the last two weeks.  Mostly mychart.  Also developed red slightly itchy eyes with some drainage.  Antibiotic eye drops times two rxs.  Rash is still present and worsening.  Location of the rash: back, chest, groin and axilla. Started lt axilla.   Quality/symptoms of rash: itching, burning and red   Symptoms are moderate and rash seems to be worsening.  Previous history of a similar rash? No  Recent exposure history: none known  Patient has HILL and very anxious the last year or so.  Apparently significant family losses last year.  Associated symptoms include: .    Past Medical History:   Diagnosis Date     Encounter for insertion of mirena IUD 06/27/2016     Menarche age    cycles q     X    d     Current Outpatient Medications   Medication Sig Dispense Refill     fluconazole (DIFLUCAN) 150 MG tablet Take 1 tablet (150 mg) by mouth daily for 3 days 3 tablet 0     levonorgestrel (MIRENA) 20 MCG/24HR IUD 1 each by Intrauterine route once.       Multiple Vitamin (MULTIVITAMIN OR) Take  by mouth daily.       tobramycin-dexamethasone (TOBRADEX) 0.3-0.1 % ophthalmic suspension Apply 1 drop in left eye three times per day. Use for 2 days after the redness is gone. Typically 5-7 days total. 1 Bottle 1     triamcinolone (KENALOG) 0.025 % external ointment Apply topically 2 times daily x5-7 days 30 g 0     triamcinolone (KENALOG) 0.5 % external ointment Apply to the affected priti 2 times a day for a maximum of 2 weeks. 30 g 0     trimethoprim-polymyxin b (POLYTRIM) 79988-6.1 UNIT/ML-% ophthalmic solution Apply 1 drop in left eye three times per day. Use for 2 days after the redness is gone. Typically 5-7 days total. (Patient not taking: Reported on 9/28/2020) 1 Bottle 1     History   Smoking Status     Never Smoker   Smokeless Tobacco     Never Used  "      ROS:  Review of systems negative except as stated above.    EXAM:   /83   Pulse 91   Temp 98.4  F (36.9  C) (Tympanic)   Ht 1.575 m (5' 2\")   Wt 56.7 kg (125 lb)   SpO2 99%   BMI 22.86 kg/m    GENERAL: alert, no acute distress.  SKIN: Rash description:    Distribution: generalized  Location: abdomen, arm, upper, axilla, back, chest, groin and thigh    Color: red,  Lesion type: papular, isolated with   GENERAL APPEARANCE: alert, severe distress, cooperative, crying and extremely anxious  EYES: conjunctiva pink but no drainage  NECK: supple, non-tender to palpation, no adenopathy noted  RESP: lungs clear to auscultation - no rales, rhonchi or wheezes  CV: regular rates and rhythm, normal S1 S2, no murmur noted    ASSESSMENT:  Candidiasis very probable given origin and most involved areas.  HILL with exacerbation    PLAN:  Talked at length as patient fears medication will make anxiety worse.  Apparently was tried on Prozac years ago and her anxiety seemed to flare.  Diflucan 150 mg daily for three days.  Refill if improving but not resolved.  See Derm.   Advised daily exercise, reyes chi and see PCP for further eval/rx of anxiety.  Follow up with primary care provider if no improvement.  "

## 2020-10-06 ENCOUNTER — VIRTUAL VISIT (OUTPATIENT)
Dept: FAMILY MEDICINE | Facility: CLINIC | Age: 40
End: 2020-10-06
Payer: COMMERCIAL

## 2020-10-06 ENCOUNTER — TELEPHONE (OUTPATIENT)
Dept: FAMILY MEDICINE | Facility: CLINIC | Age: 40
End: 2020-10-06

## 2020-10-06 DIAGNOSIS — R21 RASH: Primary | ICD-10-CM

## 2020-10-06 DIAGNOSIS — F41.1 GAD (GENERALIZED ANXIETY DISORDER): ICD-10-CM

## 2020-10-06 PROCEDURE — 99213 OFFICE O/P EST LOW 20 MIN: CPT | Mod: 95 | Performed by: NURSE PRACTITIONER

## 2020-10-06 RX ORDER — CLONAZEPAM 0.5 MG/1
0.5 TABLET, ORALLY DISINTEGRATING ORAL 2 TIMES DAILY PRN
Qty: 14 TABLET | Refills: 0 | Status: SHIPPED | OUTPATIENT
Start: 2020-10-06 | End: 2020-12-17

## 2020-10-06 ASSESSMENT — ENCOUNTER SYMPTOMS
NERVOUS/ANXIOUS: 1
SLEEP DISTURBANCE: 1

## 2020-10-06 ASSESSMENT — PAIN SCALES - GENERAL: PAINLEVEL: NO PAIN (0)

## 2020-10-07 ENCOUNTER — TELEPHONE (OUTPATIENT)
Dept: FAMILY MEDICINE | Facility: CLINIC | Age: 40
End: 2020-10-07

## 2020-10-07 NOTE — TELEPHONE ENCOUNTER
Pt is calling to talk to nurse, RN did not answer phone  I advised that nurse would call her back.     Dunia Martinez, [place]

## 2020-10-07 NOTE — TELEPHONE ENCOUNTER
Please call patient today and provide her support.  Has severe anxiety.  Background: Does not like to take oral medications.  Does not matter if it is an antibiotic or if it is a Tylenol.  Previously had taken Prozac and gave her suicidal ideation so has severe anxiety about taking any type of medication.  Currently, needs to take oral doxycycline for folliculitis.  Has taken 1 dose and is struggling with anxiety.  Yesterday, was given prescription for clonazepam ODT has not been able to take it due to severe anxiety.  Please provide her support.      Franny MAURICE

## 2020-10-07 NOTE — TELEPHONE ENCOUNTER
"Called pt back as I said I would, was busy on phone with different pt when pt called back prior   We talked for over 30min, discussed importance to continuing  Counseling and changes needed to start down a healthier path way for her , she is a RN at [employer]   She is aware of [facility]  And does not want to go there and does not feel she need to at this time , we discussed that any ED would help her if need  She denies any thoughts of suicide or hurting self or others   She is just over whelmed with life situation , she has had anxiety since age 19  The \"bad\" situation happen in nursing school where she was given Prozac and had a bad side effect of \"dark thoughts\" per pt   She not feels she has an oral aversion to taking any oral medications --discussed   We spoke with using topical benadryl for itching and she agreed to try felt better with topical   We discussed importance of treating infection  She took doxy last night and will take again this AM   She is under a lot of stress  Not working due to anxiety has apply for FMLA   not working going to school  Feels she need to take care of anything   Discussed to speak with counselor about it   Pt was stressed and teary in beginning of conversation   At end states she felt much better   We talked she does not have to be \"super women\"  To all things      We talked about the Clonazepam as short term emergency usage to take the edge off  As need --  Not sure it she will try it,   Stressed she needs to take care of herself  Most important     Pt states she felt much better after we spoke  to f/u with  provider and counselor   May call back as need   Will seek ED if need   Spent 35 min on phone with patient    KULDEEP Frye  Clinic  RN/Ramy Crow    "

## 2020-10-07 NOTE — TELEPHONE ENCOUNTER
9.00 ML on pt cell number to call clinic back   KULDEEP Rees RN/Ramy Crow      9.10 pt called back but was not able to talk has to get her daughter off to school  We made a plan to speak again at 9:30   KULDEEP Rees  RN/Ramy Crow

## 2020-10-15 ENCOUNTER — VIRTUAL VISIT (OUTPATIENT)
Dept: PSYCHOLOGY | Facility: CLINIC | Age: 40
End: 2020-10-15
Payer: COMMERCIAL

## 2020-10-15 DIAGNOSIS — F41.1 GAD (GENERALIZED ANXIETY DISORDER): Primary | ICD-10-CM

## 2020-10-15 PROCEDURE — 90834 PSYTX W PT 45 MINUTES: CPT | Mod: TEL | Performed by: SOCIAL WORKER

## 2020-10-15 NOTE — PROGRESS NOTES
"                                             Progress Note    Patient Name: Monet You  Date: 10/15/2020         Service Type: Individual      Session Start Time: 0904  Session End Time: 0954     Session Length: 50    Session #: 2    Attendees: Client    Service Modality:  Phone Visit:    The patient has been notified of the following:      \"We have found that certain health care needs can be provided without the need for a face to face visit.  This service lets us provide the care you need with a phone conversation.       I will have full access to your [place] medical record during this entire phone call.   I will be taking notes for your medical record.      Since this is like an office visit, we will bill your insurance company for this service.       There are potential benefits and risks of telephone visits (e.g. limits to patient confidentiality) that differ from in-person visits.?  Confidentiality still applies for telephone services, and nobody will record the visit.  It is important to be in a quiet, private space that is free of distractions (including cell phone or other devices) during the visit.??      If during the course of the call I believe a telephone visit is not appropriate, you will not be charged for this service\"     Consent has been obtained for this service by care team member: Yes      Treatment Plan Last Reviewed: 9/18/2020 due 1/18/2021  PHQ-9 / HILL-7 : not assessed     DATA  Interactive Complexity: No  Crisis: No       Progress Since Last Session (Related to Symptoms / Goals / Homework):   Symptoms: Worsening per pt    Homework: Completed in session      Episode of Care Goals: No improvement - CONTEMPLATION (Considering change and yet undecided); Intervened by assessing the negative and positive thinking (ambivalence) about behavior change     Current / Ongoing Stressors and Concerns:   loss, trauma, stress      Treatment Objective(s) Addressed in This Session:   Patient " will demonstrate and report a level of anxiety that can be managed at a lower level of care.  Absence of persistent anxious mood and report of reduced frequency and intensity of worry and absence of persistent anxious mood to acceptable levels, no panic attacks, report subjective comfort with rumination for a period of 90 days, within 6 months as clinically observed and by patient self-report.  Patient will demonstrate/report improved family dynamics that can be safely managed in a lower LOC.  Client report of able to express feelings of grief and loss regarding death of family member and behavior indicating progression of grief process towards acceptance and coping with reality of loss with acknowledgement of permanent change within 6 months.     Intervention:   Therapist met with patient to review goals and interventions. Therapist utilized reflected listening as patient gave brief reflection of week. Patient processed high anxiety over the last two weeks and feeling better as she continues to manage it. Therapist supported patient as she processed and shared concerns with patient. Therapist suggested weekly therapy for patient to , hold patient accountable and assist with managing her anxiety. Patient presented with an anxious affect. Patient was engaged throughout session and open to feedback. Patient reported no safety concerns.           ASSESSMENT: Current Emotional / Mental Status (status of significant symptoms):   Risk status (Self / Other harm or suicidal ideation)   Patient denies current fears or concerns for personal safety.   Patient denies current or recent suicidal ideation or behaviors.   Patient denies current or recent homicidal ideation or behaviors.   Patient denies current or recent self injurious behavior or ideation.   Patient denies other safety concerns.   Patient reports there has been no change in risk factors since their last session.     Patient reports there has been no change in  protective factors since their last session.     Recommended that patient call 911 or go to the local ED should there be a change in any of these risk factors.     Appearance:   phone session    Eye Contact:   phone session    Psychomotor Behavior: phone session    Attitude:   Cooperative  Interested Friendly   Orientation:   All   Speech    Rate / Production: Emotional Talkative    Volume:  Normal    Mood:    Anxious  Sad    Affect:    Worrisome    Thought Content:  Clear    Thought Form:  Coherent    Insight:    Poor      Medication Review:   No current psychiatric medications prescribed     Medication Compliance:   NA     Changes in Health Issues:   Yes: not feeling well     Chemical Use Review:   Substance Use: Chemical use reviewed, no active concerns identified      Tobacco Use: No current tobacco use.      Diagnosis:  1. HILL (generalized anxiety disorder)        Collateral Reports Completed:   Not Applicable    PLAN: (Patient Tasks / Therapist Tasks / Other)  Manage anxiety in moment   grounding tech    Make list of identity     Invest in therapy weekly     Sudha Garay [facility] 10/15/2020                                                         ______________________________________________________________________    Treatment Plan    Patient's Name: Monet You  YOB: 1980    Date: 9/18/2020    DSM5 Diagnoses: 300.02 (F41.1) Generalized Anxiety Disorder  Psychosocial / Contextual Factors: loss, trauma, stress   WHODAS: 18    Referral / Collaboration:  Referral to another professional/service is not indicated at this time..    Anticipated number of session or this episode of care: 20      MeasurableTreatment Goal(s) related to diagnosis / functional impairment(s)  Goal 1: Patient will report absence of persistent anxiety mood and report of reduced frequency and intensity of worry and absence of persistent anxious mood to acceptable levels, no panic attacks, report subjective  comfort with rumination for a period of 90 days. Within 6 months as clinically observed and by patient self-report.    I will know I've met my goal when light and free and not worry about everything.      Objective #A (Patient Action)    Patient will demonstrate and report a level of anxiety that can be managed at a lower level of care.  Absence of persistent anxious mood and report of reduced frequency and intensity of worry and absence of persistent anxious mood to acceptable levels, no panic attacks, report subjective comfort with rumination for a period of 90 days, within 6 months as clinically observed and by patient self-report.  Status: New - Date: 9/18/2020     Intervention(s)  Therapist will provide individual therapy to identify triggers to anxiety, gain feedback on helpful coping tools and thought-reframing techniques, and identify preferred way of being. Tx to include discuss current stressors and interpersonal conflicts and how to cope with these, coaching, diagnostic testing , referral for medication as indicated, use prescribed medication, cognitive restructuring, interpersonal,   family therapy, supportive therapy services.    Goal 2: Patient will able to express feelings of grief and loss regarding death of family member  and behavior indicating progression of grief process towards acceptance and coping with reality of loss with acknowledgement of permanent change  within 6 months    I will know I've met my goal when I won't worry about my own health and death.      Objective #A (Patient Action)    Status: New - Date: 9/18/2020     Patient will demonstrate/report improved family dynamics that can be safely managed in a lower LOC.  Client report of able to express feelings of grief and loss regarding death of family member and behavior indicating progression of grief process towards acceptance and coping with reality of loss with acknowledgement of permanent change within 6  months.    Intervention(s)  Therapist will provide individual therapy to process through grief and loss and to gain effective coping skills. Tx to discuss current stressors and interpersonal conflicts and how to cope with these, coaching, diagnostic testing, referral for medication as indicated, use prescribed medication, cognitive restructuring, interpersonal family therapy, supportive therapy services.      Patient has reviewed and agreed to the above plan.      Sudha Garay, [facility]  September 18, 2020

## 2020-11-12 ENCOUNTER — VIRTUAL VISIT (OUTPATIENT)
Dept: PSYCHOLOGY | Facility: CLINIC | Age: 40
End: 2020-11-12
Payer: COMMERCIAL

## 2020-11-12 DIAGNOSIS — F41.1 GAD (GENERALIZED ANXIETY DISORDER): Primary | ICD-10-CM

## 2020-11-12 PROCEDURE — 90834 PSYTX W PT 45 MINUTES: CPT | Mod: GT | Performed by: SOCIAL WORKER

## 2020-11-12 ASSESSMENT — ANXIETY QUESTIONNAIRES
2. NOT BEING ABLE TO STOP OR CONTROL WORRYING: SEVERAL DAYS
4. TROUBLE RELAXING: NOT AT ALL
6. BECOMING EASILY ANNOYED OR IRRITABLE: SEVERAL DAYS
GAD7 TOTAL SCORE: 4
7. FEELING AFRAID AS IF SOMETHING AWFUL MIGHT HAPPEN: SEVERAL DAYS
1. FEELING NERVOUS, ANXIOUS, OR ON EDGE: NOT AT ALL
3. WORRYING TOO MUCH ABOUT DIFFERENT THINGS: SEVERAL DAYS
5. BEING SO RESTLESS THAT IT IS HARD TO SIT STILL: NOT AT ALL

## 2020-11-12 ASSESSMENT — PATIENT HEALTH QUESTIONNAIRE - PHQ9: SUM OF ALL RESPONSES TO PHQ QUESTIONS 1-9: 4

## 2020-11-12 NOTE — PROGRESS NOTES
"                                             Progress Note    Patient Name: Monet You  Date: 11/12/2020         Service Type: Individual      Session Start Time: 1007  Session End Time: 1057     Session Length: 50    Session #: 3    Attendees: Client    Service Modality:  Phone Visit:    The patient has been notified of the following:      \"We have found that certain health care needs can be provided without the need for a face to face visit.  This service lets us provide the care you need with a phone conversation.       I will have full access to your [facility] medical record during this entire phone call.   I will be taking notes for your medical record.      Since this is like an office visit, we will bill your insurance company for this service.       There are potential benefits and risks of telephone visits (e.g. limits to patient confidentiality) that differ from in-person visits.?  Confidentiality still applies for telephone services, and nobody will record the visit.  It is important to be in a quiet, private space that is free of distractions (including cell phone or other devices) during the visit.??      If during the course of the call I believe a telephone visit is not appropriate, you will not be charged for this service\"     Consent has been obtained for this service by care team member: Yes      Treatment Plan Last Reviewed: 9/18/2020 due 1/18/2021  PHQ-9 / HILL-7 : 4/4    DATA  Interactive Complexity: No  Crisis: No       Progress Since Last Session (Related to Symptoms / Goals / Homework):   Symptoms: Improving decrease PHQ-9 HILL-7    Homework: Achieved / completed to satisfaction      Episode of Care Goals: Minimal progress - PREPARATION (Decided to change - considering how); Intervened by negotiating a change plan and determining options / strategies for behavior change, identifying triggers, exploring social supports, and working towards setting a date to begin behavior " change     Current / Ongoing Stressors and Concerns:   loss, trauma, stress      Treatment Objective(s) Addressed in This Session:   Patient will demonstrate and report a level of anxiety that can be managed at a lower level of care.  Absence of persistent anxious mood and report of reduced frequency and intensity of worry and absence of persistent anxious mood to acceptable levels, no panic attacks, report subjective comfort with rumination for a period of 90 days, within 6 months as clinically observed and by patient self-report.  Patient will demonstrate/report improved family dynamics that can be safely managed in a lower LOC.  Client report of able to express feelings of grief and loss regarding death of family member and behavior indicating progression of grief process towards acceptance and coping with reality of loss with acknowledgement of permanent change within 6 months.     Intervention:   Therapist met with patient to review goals and interventions. Therapist utilized reflected listening as patient gave brief reflection of week. Patient processed having a better week with managing her anxiety. Patient processed continued anxiety and fighting with logic and emotions. Therapist supported and validated patient. Therapist educated patient on the importance of balancing and managing through the uncomfortableness to fight the habit of anxiety provoking thoughts.  Patient presented with an anxious affect. Patient was engaged throughout session and open to feedback. Patient reported no safety concerns.           ASSESSMENT: Current Emotional / Mental Status (status of significant symptoms):   Risk status (Self / Other harm or suicidal ideation)   Patient denies current fears or concerns for personal safety.   Patient denies current or recent suicidal ideation or behaviors.   Patient denies current or recent homicidal ideation or behaviors.   Patient denies current or recent self injurious behavior or  "ideation.   Patient denies other safety concerns.   Patient reports there has been no change in risk factors since their last session.     Patient reports there has been no change in protective factors since their last session.     Recommended that patient call 911 or go to the local ED should there be a change in any of these risk factors.     Appearance:   phone session    Eye Contact:   phone session    Psychomotor Behavior: phone session    Attitude:   Cooperative  Interested Friendly   Orientation:   All   Speech    Rate / Production: Emotional Talkative    Volume:  Normal    Mood:    Anxious    Affect:    Worrisome    Thought Content:  Clear    Thought Form:  Coherent    Insight:    Poor      Medication Review:   No current psychiatric medications prescribed     Medication Compliance:   NA     Changes in Health Issues:   Yes: not feeling well     Chemical Use Review:   Substance Use: Chemical use reviewed, no active concerns identified      Tobacco Use: No current tobacco use.      Diagnosis:  1. HILL (generalized anxiety disorder)        Collateral Reports Completed:   Not Applicable    PLAN: (Patient Tasks / Therapist Tasks / Other)  Manage anxiety in moment   grounding tech    Follow through with \"and then what\" and balancing emotion with logic    Sudha Garay, [facility] 11/12/2020                                                         ______________________________________________________________________    Treatment Plan    Patient's Name: Monet You  YOB: 1980    Date: 9/18/2020    DSM5 Diagnoses: 300.02 (F41.1) Generalized Anxiety Disorder  Psychosocial / Contextual Factors: loss, trauma, stress   WHODAS: 18    Referral / Collaboration:  Referral to another professional/service is not indicated at this time..    Anticipated number of session or this episode of care: 20      MeasurableTreatment Goal(s) related to diagnosis / functional impairment(s)  Goal 1: Patient will " report absence of persistent anxiety mood and report of reduced frequency and intensity of worry and absence of persistent anxious mood to acceptable levels, no panic attacks, report subjective comfort with rumination for a period of 90 days. Within 6 months as clinically observed and by patient self-report.    I will know I've met my goal when light and free and not worry about everything.      Objective #A (Patient Action)    Patient will demonstrate and report a level of anxiety that can be managed at a lower level of care.  Absence of persistent anxious mood and report of reduced frequency and intensity of worry and absence of persistent anxious mood to acceptable levels, no panic attacks, report subjective comfort with rumination for a period of 90 days, within 6 months as clinically observed and by patient self-report.  Status: New - Date: 9/18/2020     Intervention(s)  Therapist will provide individual therapy to identify triggers to anxiety, gain feedback on helpful coping tools and thought-reframing techniques, and identify preferred way of being. Tx to include discuss current stressors and interpersonal conflicts and how to cope with these, coaching, diagnostic testing , referral for medication as indicated, use prescribed medication, cognitive restructuring, interpersonal,   family therapy, supportive therapy services.    Goal 2: Patient will able to express feelings of grief and loss regarding death of family member  and behavior indicating progression of grief process towards acceptance and coping with reality of loss with acknowledgement of permanent change  within 6 months    I will know I've met my goal when I won't worry about my own health and death.      Objective #A (Patient Action)    Status: New - Date: 9/18/2020     Patient will demonstrate/report improved family dynamics that can be safely managed in a lower LOC.  Client report of able to express feelings of grief and loss regarding death of  family member and behavior indicating progression of grief process towards acceptance and coping with reality of loss with acknowledgement of permanent change within 6 months.    Intervention(s)  Therapist will provide individual therapy to process through grief and loss and to gain effective coping skills. Tx to discuss current stressors and interpersonal conflicts and how to cope with these, coaching, diagnostic testing, referral for medication as indicated, use prescribed medication, cognitive restructuring, interpersonal family therapy, supportive therapy services.      Patient has reviewed and agreed to the above plan.      Sudha Garay, [facility]  September 18, 2020

## 2020-11-13 ASSESSMENT — ANXIETY QUESTIONNAIRES: GAD7 TOTAL SCORE: 4

## 2020-11-17 ENCOUNTER — VIRTUAL VISIT (OUTPATIENT)
Dept: PSYCHOLOGY | Facility: CLINIC | Age: 40
End: 2020-11-17
Payer: COMMERCIAL

## 2020-11-17 DIAGNOSIS — F41.1 GAD (GENERALIZED ANXIETY DISORDER): Primary | ICD-10-CM

## 2020-11-17 PROCEDURE — 90834 PSYTX W PT 45 MINUTES: CPT | Mod: GT | Performed by: SOCIAL WORKER

## 2020-11-17 ASSESSMENT — ANXIETY QUESTIONNAIRES
5. BEING SO RESTLESS THAT IT IS HARD TO SIT STILL: NOT AT ALL
1. FEELING NERVOUS, ANXIOUS, OR ON EDGE: SEVERAL DAYS
6. BECOMING EASILY ANNOYED OR IRRITABLE: SEVERAL DAYS
2. NOT BEING ABLE TO STOP OR CONTROL WORRYING: SEVERAL DAYS
GAD7 TOTAL SCORE: 7
4. TROUBLE RELAXING: SEVERAL DAYS
7. FEELING AFRAID AS IF SOMETHING AWFUL MIGHT HAPPEN: SEVERAL DAYS
3. WORRYING TOO MUCH ABOUT DIFFERENT THINGS: MORE THAN HALF THE DAYS

## 2020-11-17 ASSESSMENT — PATIENT HEALTH QUESTIONNAIRE - PHQ9: SUM OF ALL RESPONSES TO PHQ QUESTIONS 1-9: 4

## 2020-11-17 NOTE — PROGRESS NOTES
"                                             Progress Note    Patient Name: Monet You  Date: 11/17/2020         Service Type: Individual      Session Start Time: 1034  Session End Time: 1124     Session Length: 50    Session #: 4    Attendees: Client    Service Modality:  Phone Visit:    The patient has been notified of the following:      \"We have found that certain health care needs can be provided without the need for a face to face visit.  This service lets us provide the care you need with a phone conversation.       I will have full access to your [place] medical record during this entire phone call.   I will be taking notes for your medical record.      Since this is like an office visit, we will bill your insurance company for this service.       There are potential benefits and risks of telephone visits (e.g. limits to patient confidentiality) that differ from in-person visits.?  Confidentiality still applies for telephone services, and nobody will record the visit.  It is important to be in a quiet, private space that is free of distractions (including cell phone or other devices) during the visit.??      If during the course of the call I believe a telephone visit is not appropriate, you will not be charged for this service\"     Consent has been obtained for this service by care team member: Yes      Treatment Plan Last Reviewed: 9/18/2020 due 1/18/2021  PHQ-9 / HILL-7 : 4/7    DATA  Interactive Complexity: No  Crisis: No       Progress Since Last Session (Related to Symptoms / Goals / Homework):   Symptoms: Worsening increase HILL-7    Homework: Achieved / completed to satisfaction      Episode of Care Goals: Minimal progress - PREPARATION (Decided to change - considering how); Intervened by negotiating a change plan and determining options / strategies for behavior change, identifying triggers, exploring social supports, and working towards setting a date to begin behavior " change     Current / Ongoing Stressors and Concerns:   loss, trauma, stress      Treatment Objective(s) Addressed in This Session:   Patient will demonstrate and report a level of anxiety that can be managed at a lower level of care.  Absence of persistent anxious mood and report of reduced frequency and intensity of worry and absence of persistent anxious mood to acceptable levels, no panic attacks, report subjective comfort with rumination for a period of 90 days, within 6 months as clinically observed and by patient self-report.  Patient will demonstrate/report improved family dynamics that can be safely managed in a lower LOC.  Client report of able to express feelings of grief and loss regarding death of family member and behavior indicating progression of grief process towards acceptance and coping with reality of loss with acknowledgement of permanent change within 6 months.     Intervention:   Therapist met with patient to review goals and interventions. Therapist utilized reflected listening as patient gave brief reflection of week. Patient processed COVID-19 situation at work and individual/marriage. Therapist supported patient as she processed. Therapist encouraged patient to work on removing work hat when home. Patient processed her struggling and wants. Therapist encouraged patient to speak to her spouse about her emotions and for her communicate mutual goals/expectations in marriage Patient presented with an anxious affect. Patient was engaged throughout session and open to feedback. Patient reported no safety concerns.           ASSESSMENT: Current Emotional / Mental Status (status of significant symptoms):   Risk status (Self / Other harm or suicidal ideation)   Patient denies current fears or concerns for personal safety.   Patient denies current or recent suicidal ideation or behaviors.   Patient denies current or recent homicidal ideation or behaviors.   Patient denies current or recent self  injurious behavior or ideation.   Patient denies other safety concerns.   Patient reports there has been no change in risk factors since their last session.     Patient reports there has been no change in protective factors since their last session.     Recommended that patient call 911 or go to the local ED should there be a change in any of these risk factors.     Appearance:   phone session    Eye Contact:   phone session    Psychomotor Behavior: phone session    Attitude:   Cooperative  Interested Friendly   Orientation:   All   Speech    Rate / Production: Emotional Talkative    Volume:  Normal    Mood:    Anxious    Affect:    Worrisome    Thought Content:  Clear    Thought Form:  Coherent    Insight:    Fair       Medication Review:   No current psychiatric medications prescribed     Medication Compliance:   NA     Changes in Health Issues:   None reported     Chemical Use Review:   Substance Use: Chemical use reviewed, no active concerns identified      Tobacco Use: No current tobacco use.      Diagnosis:  1. 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    Anticipated number of session or this episode of care: 20      MeasurableTreatment Goal(s) related to diagnosis / functional impairment(s)  Goal 1: Patient will  report absence of persistent anxiety mood and report of reduced frequency and intensity of worry and absence of persistent anxious mood to acceptable levels, no panic attacks, report subjective comfort with rumination for a period of 90 days. Within 6 months as clinically observed and by patient self-report.    I will know I've met my goal when light and free and not worry about everything.      Objective #A (Patient Action)    Patient will demonstrate and report a level of anxiety that can be managed at a lower level of care.  Absence of persistent anxious mood and report of reduced frequency and intensity of worry and absence of persistent anxious mood to acceptable levels, no panic attacks, report subjective comfort with rumination for a period of 90 days, within 6 months as clinically observed and by patient self-report.  Status: New - Date: 9/18/2020     Intervention(s)  Therapist will provide individual therapy to identify triggers to anxiety, gain feedback on helpful coping tools and thought-reframing techniques, and identify preferred way of being. Tx to include discuss current stressors and interpersonal conflicts and how to cope with these, coaching, diagnostic testing , referral for medication as indicated, use prescribed medication, cognitive restructuring, interpersonal,   family therapy, supportive therapy services.    Goal 2: Patient will able to express feelings of grief and loss regarding death of family member  and behavior indicating progression of grief process towards acceptance and coping with reality of loss with acknowledgement of permanent change  within 6 months    I will know I've met my goal when I won't worry about my own health and death.      Objective #A (Patient Action)    Status: New - Date: 9/18/2020     Patient will demonstrate/report improved family dynamics that can be safely managed in a lower LOC.  Client report of able to express feelings of grief and loss regarding death of  family member and behavior indicating progression of grief process towards acceptance and coping with reality of loss with acknowledgement of permanent change within 6 months.    Intervention(s)  Therapist will provide individual therapy to process through grief and loss and to gain effective coping skills. Tx to discuss current stressors and interpersonal conflicts and how to cope with these, coaching, diagnostic testing, referral for medication as indicated, use prescribed medication, cognitive restructuring, interpersonal family therapy, supportive therapy services.      Patient has reviewed and agreed to the above plan.      Sudha Garay, Health system  September 18, 2020

## 2020-11-18 ASSESSMENT — ANXIETY QUESTIONNAIRES: GAD7 TOTAL SCORE: 7

## 2020-11-27 ENCOUNTER — VIRTUAL VISIT (OUTPATIENT)
Dept: FAMILY MEDICINE | Facility: CLINIC | Age: 40
End: 2020-11-27
Payer: COMMERCIAL

## 2020-11-27 ENCOUNTER — MYC MEDICAL ADVICE (OUTPATIENT)
Dept: FAMILY MEDICINE | Facility: CLINIC | Age: 40
End: 2020-11-27

## 2020-11-27 DIAGNOSIS — L20.9 ATOPIC DERMATITIS, UNSPECIFIED TYPE: ICD-10-CM

## 2020-11-27 DIAGNOSIS — Z09 FOLLOW-UP EXAM: ICD-10-CM

## 2020-11-27 DIAGNOSIS — R63.39 FOOD AVERSION: ICD-10-CM

## 2020-11-27 DIAGNOSIS — F41.9 ANXIETY: ICD-10-CM

## 2020-11-27 DIAGNOSIS — R53.83 FATIGUE, UNSPECIFIED TYPE: ICD-10-CM

## 2020-11-27 DIAGNOSIS — R63.4 UNINTENTIONAL WEIGHT LOSS: ICD-10-CM

## 2020-11-27 DIAGNOSIS — R61 NIGHT SWEATS: ICD-10-CM

## 2020-11-27 DIAGNOSIS — R21 RASH AND NONSPECIFIC SKIN ERUPTION: Primary | ICD-10-CM

## 2020-11-27 LAB
ALBUMIN SERPL-MCNC: 4.3 G/DL (ref 3.4–5)
ALP SERPL-CCNC: 72 U/L (ref 40–150)
ALT SERPL W P-5'-P-CCNC: 23 U/L (ref 0–50)
ANION GAP SERPL CALCULATED.3IONS-SCNC: 7 MMOL/L (ref 3–14)
AST SERPL W P-5'-P-CCNC: 11 U/L (ref 0–45)
BASOPHILS # BLD AUTO: 0 10E9/L (ref 0–0.2)
BASOPHILS NFR BLD AUTO: 0.3 %
BILIRUB SERPL-MCNC: 0.8 MG/DL (ref 0.2–1.3)
BUN SERPL-MCNC: 10 MG/DL (ref 7–30)
CALCIUM SERPL-MCNC: 9.1 MG/DL (ref 8.5–10.1)
CHLORIDE SERPL-SCNC: 105 MMOL/L (ref 94–109)
CO2 SERPL-SCNC: 28 MMOL/L (ref 20–32)
CREAT SERPL-MCNC: 0.69 MG/DL (ref 0.52–1.04)
CRP SERPL-MCNC: <2.9 MG/L (ref 0–8)
DIFFERENTIAL METHOD BLD: NORMAL
EOSINOPHIL # BLD AUTO: 0.1 10E9/L (ref 0–0.7)
EOSINOPHIL NFR BLD AUTO: 1.7 %
ERYTHROCYTE [DISTWIDTH] IN BLOOD BY AUTOMATED COUNT: 12.6 % (ref 10–15)
ERYTHROCYTE [SEDIMENTATION RATE] IN BLOOD BY WESTERGREN METHOD: 7 MM/H (ref 0–20)
FERRITIN SERPL-MCNC: 117 NG/ML (ref 12–150)
GFR SERPL CREATININE-BSD FRML MDRD: >90 ML/MIN/{1.73_M2}
GLUCOSE SERPL-MCNC: 89 MG/DL (ref 70–99)
HCT VFR BLD AUTO: 43.3 % (ref 35–47)
HGB BLD-MCNC: 14.5 G/DL (ref 11.7–15.7)
IRON SATN MFR SERPL: 43 % (ref 15–46)
IRON SERPL-MCNC: 142 UG/DL (ref 35–180)
LYMPHOCYTES # BLD AUTO: 1.8 10E9/L (ref 0.8–5.3)
LYMPHOCYTES NFR BLD AUTO: 27.2 %
MCH RBC QN AUTO: 32.1 PG (ref 26.5–33)
MCHC RBC AUTO-ENTMCNC: 33.5 G/DL (ref 31.5–36.5)
MCV RBC AUTO: 96 FL (ref 78–100)
MONOCYTES # BLD AUTO: 0.5 10E9/L (ref 0–1.3)
MONOCYTES NFR BLD AUTO: 6.9 %
NEUTROPHILS # BLD AUTO: 4.3 10E9/L (ref 1.6–8.3)
NEUTROPHILS NFR BLD AUTO: 63.9 %
PLATELET # BLD AUTO: 246 10E9/L (ref 150–450)
POTASSIUM SERPL-SCNC: 4.2 MMOL/L (ref 3.4–5.3)
PROT SERPL-MCNC: 7.8 G/DL (ref 6.8–8.8)
RBC # BLD AUTO: 4.52 10E12/L (ref 3.8–5.2)
SODIUM SERPL-SCNC: 140 MMOL/L (ref 133–144)
TIBC SERPL-MCNC: 328 UG/DL (ref 240–430)
TSH SERPL DL<=0.005 MIU/L-ACNC: 2.66 MU/L (ref 0.4–4)
WBC # BLD AUTO: 6.7 10E9/L (ref 4–11)

## 2020-11-27 PROCEDURE — 80050 GENERAL HEALTH PANEL: CPT | Performed by: NURSE PRACTITIONER

## 2020-11-27 PROCEDURE — 82306 VITAMIN D 25 HYDROXY: CPT | Performed by: NURSE PRACTITIONER

## 2020-11-27 PROCEDURE — 86038 ANTINUCLEAR ANTIBODIES: CPT | Performed by: NURSE PRACTITIONER

## 2020-11-27 PROCEDURE — 85652 RBC SED RATE AUTOMATED: CPT | Performed by: NURSE PRACTITIONER

## 2020-11-27 PROCEDURE — 82728 ASSAY OF FERRITIN: CPT | Performed by: NURSE PRACTITIONER

## 2020-11-27 PROCEDURE — 36415 COLL VENOUS BLD VENIPUNCTURE: CPT | Performed by: NURSE PRACTITIONER

## 2020-11-27 PROCEDURE — 83540 ASSAY OF IRON: CPT | Performed by: NURSE PRACTITIONER

## 2020-11-27 PROCEDURE — 86039 ANTINUCLEAR ANTIBODIES (ANA): CPT | Performed by: NURSE PRACTITIONER

## 2020-11-27 PROCEDURE — 99214 OFFICE O/P EST MOD 30 MIN: CPT | Mod: 95 | Performed by: NURSE PRACTITIONER

## 2020-11-27 PROCEDURE — 83550 IRON BINDING TEST: CPT | Performed by: NURSE PRACTITIONER

## 2020-11-27 PROCEDURE — 86140 C-REACTIVE PROTEIN: CPT | Performed by: NURSE PRACTITIONER

## 2020-11-27 RX ORDER — DOXYCYCLINE 100 MG/1
100 CAPSULE ORAL 2 TIMES DAILY
Qty: 28 CAPSULE | Refills: 1 | Status: SHIPPED | OUTPATIENT
Start: 2020-11-27 | End: 2020-12-25

## 2020-11-27 NOTE — PATIENT INSTRUCTIONS
Patient Education     What Is Atopic Dermatitis?  Atopic dermatitis (eczema) causes chronic skin irritation. This condition can affect people of all ages. It often runs in families (is genetic). It may also be linked to allergies such as hay fever and sometimes asthma. Patches of skin become dry, red, itchy, and scaly. In people with abnormally dry skin it's often called xerosis. Sometimes atopic dermatitis is only on the hands or feet. It often improves when the skin is well hydrated. It gets worse when the skin is dry. You can help control symptoms by practicing good self-care. Stay away from anything that causes flare-ups such as sunburn or vigorous scratching.   Where do you have symptoms?  Atopic dermatitis symptoms can appear anywhere on the body. But in most cases, they vary based on the person s age. In babies, irritation is often seen on the scalp, cheeks, chin, near the mouth, and under the eyelids. In children ages 2 through 10, skin folds such as the backs of the knees or in the arm crease are most often affected. In children 11 and older and in adults, symptoms can affect many areas.   What triggers symptoms?  Symptoms flare because of many things. These include skin dryness, scratching, stress, harsh soaps, and irritants such as dust or wool. Try to stay away from anything that causes flare-ups.   Recognizing what causes flare-ups  To figure out what causes atopic dermatitis to flare, keep a list of things that seem to affect your skin. Start by filling in the spaces below. Then keep writing them down in a notebook or diary. The things that affect each person vary. So keep your own list and try to stay away from your triggers.     [employer] last reviewed this educational content on 8/1/2019 2000-2020 [employer]. [address] [place] [address], [place] [address]. All rights reserved. This information is not intended as a substitute for professional medical care. Always follow your healthcare  professional's instructions.           Patient Education     Managing Atopic Dermatitis (Eczema)     After bathing, gently pat your skin dry (don t rub). Apply moisturizer while your skin is still damp.   To manage your symptoms and help reduce the severity and frequency, try these self-care tips:   Caring for your skin    Use a gentle, fragrance-free cleanser (or soap substitute cleanser) for bathing. Rinse well. Pat skin dry.    Take warm, not hot, baths or showers. Try to limit them to no more that 10 to 15 minutes.     Use moisturizer liberally right after you bathe, while your skin is still damp.    Try not to scratch because it will cause more damage to your skin.     Topical, over-the-counter hydrocortisone cream may help control mild symptoms.     Controlling your environment    Stay out of extreme heat or cold.    Stay out of very humid or very dry air.    If your home or office air is very dry, use a humidifier.    Stay away from allergens such as dust that may be in bedding, carpets, plush toys, or rugs.    Know that pet hair and dander can cause flare-ups.    Seeking medical treatment  Another way to keep symptoms under control is to seek medical treatment. Talk with your healthcare provider about the type of treatment that may work best for you. Your provider may prescribe treatments such as:     Treatments to put on the skin daily    Medicines taken by mouth (oral medicines) such as antihistamines, antibiotics, or corticosteroids    In severe cases, you may need shots (injections) to control the symptoms. You may even need antibiotics if skin infections occur.  Treatments don t work the same way for every person. So if your symptoms continue or get worse, ask your healthcare provider about other treatments.   Making lifestyle choices    Manage the stress in your life.    Wear loose-fitting cotton clothing that does not bind or rub your skin.    Don't wear wool or other scratchy fabrics.    Use  fragrance-free products.    Next step  Now that you know more about atopic dermatitis, the next step is up to you. Follow your healthcare provider s treatment plan and your self-care routine. This will help bring atopic dermatitis under control. If your symptoms persist, be sure to let your health care provider know.   [employer] last reviewed this educational content on 8/1/2019 2000-2020 [employer]. [address] [place] [address], [place] [address]. All rights reserved. This information is not intended as a substitute for professional medical care. Always follow your healthcare professional's instructions.           Patient Education     Atopic Dermatitis (Adult)  Atopic dermatitis is a dry, itchy, red rash. It s also called eczema. The rash is chronic, or ongoing. It can come and go over time. The disease is often passed down in families. It causes a problem with the skin barrier that makes the skin more sensitive to the environment and other factors. The increased skin sensitivity causes an itch, which causes scratching. Scratching can worsen the itching or also break the skin. This can put the skin at risk of infection.   The condition is most common in people with asthma, hay fever, hives, or dry or sensitive skin. The rash may be caused by extreme heat or heavy sweating. Skin irritants can cause the rash to flare up. These can include wool or silk clothing, grease, oils, some medicines, and harsh soaps and detergents. Emotional stress can also be a trigger.   Treatment is done to relieve the itching and inflammation of the skin. This is often done with home care and over-the-counter treatments. Your healthcare provider may prescribe other treatments.   Home care  Follow these tips to care for your condition:    Keep the areas of rash clean by bathing at least every other day. Use lukewarm water to bathe. Don t use hot water, which can dry out the skin.    Don t use soaps with strong detergents. Use  mild soaps made for sensitive skin.    Apply a cream or ointment to damp skin right after bathing.    Avoid things that irritate your skin. Wear absorbent, soft fabrics next to the skin rather than rough or scratchy materials.    Use mild laundry soap free of scents and perfumes. Make sure to rinse all the soap out of your clothes.    Treat any skin infection as directed.    Use oral diphenhydramine to help reduce itching. This is an antihistamine you can buy at drug and grocery stores. It can make you sleepy, so use lower doses during the daytime. Be cautious of driving or operating machinery. Or you can use loratadine or other antihistamines that will not make you sleepy. Don't use diphenhydramine if you have glaucoma or have trouble urinating due to an enlarged prostate.  Follow-up care  See your healthcare provider, or as advised. If your symptoms don t get better or if they get worse in the next 7 days, make an appointment with your healthcare provider.   When to seek medical advice  Call your healthcare provider right away  if any of these occur:    Increasing area of redness or pain in the skin    Yellow crusts or wet drainage from the rash    Fever of 100.4 F (38 C) or higher, or as directed by your healthcare provider  [employer] last reviewed this educational content on 8/1/2019 2000-2020 [employer]. [address] [place] [address], [place] [address]. All rights reserved. This information is not intended as a substitute for professional medical care. Always follow your healthcare professional's instructions.

## 2020-11-27 NOTE — PROGRESS NOTES
"Monet You is a 40 year old female who is being evaluated via a billable video visit.      The patient has been notified of following:     \"This video visit will be conducted via a call between you and your physician/provider. We have found that certain health care needs can be provided without the need for an in-person physical exam.  This service lets us provide the care you need with a video conversation.  If a prescription is necessary we can send it directly to your pharmacy.  If lab work is needed we can place an order for that and you can then stop by our lab to have the test done at a later time.    Video visits are billed at different rates depending on your insurance coverage.  Please reach out to your insurance provider with any questions.    If during the course of the call the physician/provider feels a video visit is not appropriate, you will not be charged for this service.\"    Patient has given verbal consent for Video visit? Yes  How would you like to obtain your AVS? MyChart  If you are dropped from the video visit, the video invite should be resent to: Text to cell phone: [phone]  Will anyone else be joining your video visit? No    Subjective     Monet You is a 40 year old female who presents today via video visit for the following health issues:    HPI     Following up on: rash    Last visit this was discussed: 10/6/2020 with Justo    Progression of Symptoms:  improving and worsening    Accompanying Signs & Symptoms: Wednesday came back. Rash is on all over, under armpits, back, hips. Itching-mild. Low energy, fatigue for 1 week. Weight loss-112 lbs now. Dizziness. Covid test negative yesterday.    Taking Medication as prescribed: yes    Side Effects:  None    Medication Helping Symptoms:  Yes    She reports Rash coming back- had at the end of September- tried many things- topical steroids, topical antifungal, bactroban, oral antifungal, oral steroids, " doxycycline.  She has seen dermatology and an allergist and has tried a food elimination diet.  Doxycycline is the only thing that seemed to work.  Started in armpits- thought is was razor burn.  Spread to whole body- miserable, itchy. She starting using deodorant again and the rash returned.  She has since stopped using the deoderant again.   She reports whole body itching for years, wondering if this is r/t the rash.  She has not felt good the last few months.  Her anxiety has been high, and she has had food aversion due to this, but her anxiety has been good the last month and she is still losing weight.  She is eating healthily- 3 meals per day with snacks.  She has not been exercising because she has not been feeling well.  Low energy, constantly tired, sweaty when she wakes up.       Video Start Time: 1020    Review of Systems   Constitutional, HEENT, cardiovascular, pulmonary, GI, , musculoskeletal, neuro, skin, endocrine and psych systems are negative, except as otherwise noted.      Objective           Vitals:  No vitals were obtained today due to virtual visit.    Physical Exam     GENERAL: Healthy, alert and no distress  EYES: Eyes grossly normal to inspection.  No discharge or erythema, or obvious scleral/conjunctival abnormalities.  RESP: No audible wheeze, cough, or visible cyanosis.  No visible retractions or increased work of breathing.    SKIN: Visible skin clear. No significant rash, abnormal pigmentation or lesions.  NEURO: Cranial nerves grossly intact.  Mentation and speech appropriate for age.  PSYCH: Mentation appears normal, affect normal/bright, judgement and insight intact, normal speech and appearance well-groomed.      See orders- advised pt to make a lab only appointment- will check for autoimmune causes of rash and other causes of fatigue. Advised colonoscopy recommended for unintentional weight loss/ night sweats        Assessment & Plan     Monet was seen today for derm  problem.    Diagnoses and all orders for this visit:    Rash and nonspecific skin eruption  -     doxycycline hyclate (VIBRAMYCIN) 100 MG capsule; Take 1 capsule (100 mg) by mouth 2 times daily for 28 days    Atopic dermatitis, unspecified type  -     doxycycline hyclate (VIBRAMYCIN) 100 MG capsule; Take 1 capsule (100 mg) by mouth 2 times daily for 28 days    Unintentional weight loss  -     TSH with free T4 reflex  -     CBC with platelets and differential  -     Comprehensive metabolic panel  -     Iron and iron binding capacity  -     Ferritin  -     Vitamin D Deficiency  -     CRP, inflammation  -     ESR: Erythrocyte sedimentation rate  -     Anti Nuclear Sierra IgG by IFA with Reflex  -     GASTROENTEROLOGY ADULT REF PROCEDURE ONLY; Future    Anxiety  -     GASTROENTEROLOGY ADULT REF PROCEDURE ONLY; Future    Food aversion  -     GASTROENTEROLOGY ADULT REF PROCEDURE ONLY; Future    Fatigue, unspecified type  -     TSH with free T4 reflex  -     CBC with platelets and differential  -     Comprehensive metabolic panel  -     Iron and iron binding capacity  -     Ferritin  -     Vitamin D Deficiency  -     CRP, inflammation  -     ESR: Erythrocyte sedimentation rate  -     Anti Nuclear Sierra IgG by IFA with Reflex    Night sweats  -     TSH with free T4 reflex  -     CBC with platelets and differential  -     Comprehensive metabolic panel  -     Iron and iron binding capacity  -     Ferritin  -     Vitamin D Deficiency  -     CRP, inflammation  -     ESR: Erythrocyte sedimentation rate  -     Anti Nuclear Sierra IgG by IFA with Reflex  -     GASTROENTEROLOGY ADULT REF PROCEDURE ONLY; Future    Follow-up exam            See Patient Instructions: advised to schedule labs/ imaging.  Follow up if needed for worsening symptoms.     Return in about 4 weeks (around 12/25/2020), or if symptoms worsen or fail to improve.    ELIDA Lynn  [facility]      Video-Visit Details    Type of service:  Video  Visit    Video End Time:10:45 AM    Originating Location (pt. Location): Home    Distant Location (provider location):  [facility]     Platform used for Video Visit: Elaine

## 2020-11-28 ENCOUNTER — MYC MEDICAL ADVICE (OUTPATIENT)
Dept: FAMILY MEDICINE | Facility: CLINIC | Age: 40
End: 2020-11-28

## 2020-11-30 LAB
ANA PAT SER IF-IMP: ABNORMAL
ANA SER QL IF: POSITIVE
ANA TITR SER IF: ABNORMAL {TITER}
DEPRECATED CALCIDIOL+CALCIFEROL SERPL-MC: 36 UG/L (ref 20–75)

## 2020-12-03 ENCOUNTER — THERAPY VISIT (OUTPATIENT)
Dept: PHYSICAL THERAPY | Facility: CLINIC | Age: 40
End: 2020-12-03
Payer: COMMERCIAL

## 2020-12-03 ENCOUNTER — VIRTUAL VISIT (OUTPATIENT)
Dept: PSYCHOLOGY | Facility: CLINIC | Age: 40
End: 2020-12-03
Payer: COMMERCIAL

## 2020-12-03 DIAGNOSIS — F41.1 GAD (GENERALIZED ANXIETY DISORDER): Primary | ICD-10-CM

## 2020-12-03 DIAGNOSIS — M54.41 BILATERAL LOW BACK PAIN WITH RIGHT-SIDED SCIATICA: ICD-10-CM

## 2020-12-03 PROCEDURE — 97164 PT RE-EVAL EST PLAN CARE: CPT | Mod: GP | Performed by: PHYSICAL THERAPIST

## 2020-12-03 PROCEDURE — 90834 PSYTX W PT 45 MINUTES: CPT | Mod: GT | Performed by: SOCIAL WORKER

## 2020-12-03 PROCEDURE — 97110 THERAPEUTIC EXERCISES: CPT | Mod: GP | Performed by: PHYSICAL THERAPIST

## 2020-12-03 PROCEDURE — 97140 MANUAL THERAPY 1/> REGIONS: CPT | Mod: GP | Performed by: PHYSICAL THERAPIST

## 2020-12-03 ASSESSMENT — ANXIETY QUESTIONNAIRES
GAD7 TOTAL SCORE: 3
4. TROUBLE RELAXING: NOT AT ALL
7. FEELING AFRAID AS IF SOMETHING AWFUL MIGHT HAPPEN: NOT AT ALL
1. FEELING NERVOUS, ANXIOUS, OR ON EDGE: SEVERAL DAYS
3. WORRYING TOO MUCH ABOUT DIFFERENT THINGS: NOT AT ALL
2. NOT BEING ABLE TO STOP OR CONTROL WORRYING: SEVERAL DAYS
6. BECOMING EASILY ANNOYED OR IRRITABLE: SEVERAL DAYS
5. BEING SO RESTLESS THAT IT IS HARD TO SIT STILL: NOT AT ALL

## 2020-12-03 ASSESSMENT — PATIENT HEALTH QUESTIONNAIRE - PHQ9: SUM OF ALL RESPONSES TO PHQ QUESTIONS 1-9: 4

## 2020-12-03 NOTE — PROGRESS NOTES
PROGRESS  REPORT    Progress reporting period is from 9/3/20 to 12/3/20.       SUBJECTIVE  Subjective changes noted by patient:  Patient returns to therapy following 3 months away. She reports that her symptoms have been different over the past few weeks than what they were before. She reports she has been noticing pain to the distal thigh on the right side after sitting and will get tingling in bilateral lower legs after sitting for awhile. She reports she hasn't been going on her daily outdoor walks lately with the colder weather.. She reports consistency with REIL 1x/day in the morning and will occassionally do them throughout the day.    Current Pain level: 0/10(patient describes discomfort, not pain).      Initial Pain level: 7/10.   Changes in function:  None  Adverse reaction to treatment or activity: None    OBJECTIVE  Changes noted in objective findings: AROM lumbar flexion WNL, extension Min-Mod loss. Patient demonstrates with fair seated posture. Lower lumbar hypomobility remains     ASSESSMENT/PLAN  Updated problem list and treatment plan: Diagnosis 1:  Low back pain  Pain -  self management, education, directional preference exercise and home program  Decreased ROM/flexibility - manual therapy, therapeutic exercise and home program  Decreased joint mobility - manual therapy, therapeutic exercise and home program  Impaired muscle performance - neuro re-education and home program  Decreased function - therapeutic activities and home program  Impaired posture - neuro re-education and home program  STG/LTGs have been met or progress has been made towards goals:  Yes (See Goal flow sheet completed today.)  Assessment of Progress: The patient has had set backs in their progress.  The patient's condition has exacerbated.  Self Management Plans:  Patient has been instructed in a home treatment program.  Patient  has been instructed in self management of symptoms.  I have re-evaluated this patient and find that  the nature, scope, duration and intensity of the therapy is appropriate for the medical condition of the patient.  Monet continues to require the following intervention to meet STG and LTG's:  PT    Recommendations:  This patient would benefit from continued therapy.     Frequency:  1 X week, once daily  Duration:  for 4 weeks tapering to 2 X a month over 4 weeks        Please refer to the daily flowsheet for treatment today, total treatment time and time spent performing 1:1 timed codes.

## 2020-12-03 NOTE — PROGRESS NOTES
Progress Note    Patient Name: Monet You  Date: 12/3/2020         Service Type: Individual      Session Start Time: 1006  Session End Time: 1057     Session Length: 50    Session #: 5    Attendees: Client    Service Modality:  Video Yes, the patient's condition can be safely assessed and treated via synchronous audio and visual telemedicine encounter.      Reason for Video Visit: Services only offered telehealth    Location of Originating Site: Patient's home    Distant Site: Provider Remote Setting home office      Consent:  The patient/guardian has verbally consented to: the potential risks and benefits of telemedicine (video visit) versus in person care; bill my insurance or make self-payment for services provided; and responsibility for payment of non-covered services.      Mode of transmission-Balzo       Treatment Plan Last Reviewed: 9/18/2020 due 1/18/2021  PHQ-9 / HILL-7 : 4/3    DATA  Interactive Complexity: No  Crisis: No       Progress Since Last Session (Related to Symptoms / Goals / Homework):   Symptoms: Worsening increase HILL-7    Homework: Achieved / completed to satisfaction      Episode of Care Goals: Minimal progress - PREPARATION (Decided to change - considering how); Intervened by negotiating a change plan and determining options / strategies for behavior change, identifying triggers, exploring social supports, and working towards setting a date to begin behavior change     Current / Ongoing Stressors and Concerns:   loss, trauma, stress      Treatment Objective(s) Addressed in This Session:   Patient will demonstrate and report a level of anxiety that can be managed at a lower level of care.  Absence of persistent anxious mood and report of reduced frequency and intensity of worry and absence of persistent anxious mood to acceptable levels, no panic attacks, report subjective comfort with rumination for a period of 90 days, within 6  months as clinically observed and by patient self-report.  Patient will demonstrate/report improved family dynamics that can be safely managed in a lower LOC.  Client report of able to express feelings of grief and loss regarding death of family member and behavior indicating progression of grief process towards acceptance and coping with reality of loss with acknowledgement of permanent change within 6 months.     Intervention:   Therapist met with patient to review goals and interventions. Therapist utilized reflected listening as patient gave brief reflection of week. Patient processed feeling frustrated with her anxiety. Therapist supported patient as she processed and validated patient. Therapist coached patient through managing anxiety and then what's. Patient presented with an anxious affect. Patient was engaged throughout session and open to feedback. Patient reported no safety concerns.           ASSESSMENT: Current Emotional / Mental Status (status of significant symptoms):   Risk status (Self / Other harm or suicidal ideation)   Patient denies current fears or concerns for personal safety.   Patient denies current or recent suicidal ideation or behaviors.   Patient denies current or recent homicidal ideation or behaviors.   Patient denies current or recent self injurious behavior or ideation.   Patient denies other safety concerns.   Patient reports there has been no change in risk factors since their last session.     Patient reports there has been no change in protective factors since their last session.     Recommended that patient call 911 or go to the local ED should there be a change in any of these risk factors.     Appearance:   Appropriate    Eye Contact:   Fair    Psychomotor Behavior: Restless    Attitude:   Cooperative  Interested Friendly   Orientation:   All   Speech    Rate / Production: Emotional Talkative    Volume:  Normal    Mood:    Anxious    Affect:    Worrisome    Thought  Content:  Clear    Thought Form:  Coherent    Insight:    Fair       Medication Review:   No current psychiatric medications prescribed     Medication Compliance:   NA     Changes in Health Issues:   None reported     Chemical Use Review:   Substance Use: Chemical use reviewed, no active concerns identified      Tobacco Use: No current tobacco use.      Diagnosis:  1. HILL (generalized anxiety disorder)        Collateral Reports Completed:   Not Applicable    PLAN: (Patient Tasks / Therapist Tasks / Other)  And then what  Sudha Garay, [facility] 12/3/2020                                                         ______________________________________________________________________    Treatment Plan    Patient's Name: Monet You  YOB: 1980    Date: 9/18/2020    DSM5 Diagnoses: 300.02 (F41.1) Generalized Anxiety Disorder  Psychosocial / Contextual Factors: loss, trauma, stress   WHODAS: 18    Referral / Collaboration:  Referral to another professional/service is not indicated at this time..    Anticipated number of session or this episode of care: 20      MeasurableTreatment Goal(s) related to diagnosis / functional impairment(s)  Goal 1: Patient will report absence of persistent anxiety mood and report of reduced frequency and intensity of worry and absence of persistent anxious mood to acceptable levels, no panic attacks, report subjective comfort with rumination for a period of 90 days. Within 6 months as clinically observed and by patient self-report.    I will know I've met my goal when light and free and not worry about everything.      Objective #A (Patient Action)    Patient will demonstrate and report a level of anxiety that can be managed at a lower level of care.  Absence of persistent anxious mood and report of reduced frequency and intensity of worry and absence of persistent anxious mood to acceptable levels, no panic attacks, report subjective comfort with rumination for a  period of 90 days, within 6 months as clinically observed and by patient self-report.  Status: New - Date: 9/18/2020     Intervention(s)  Therapist will provide individual therapy to identify triggers to anxiety, gain feedback on helpful coping tools and thought-reframing techniques, and identify preferred way of being. Tx to include discuss current stressors and interpersonal conflicts and how to cope with these, coaching, diagnostic testing , referral for medication as indicated, use prescribed medication, cognitive restructuring, interpersonal,   family therapy, supportive therapy services.    Goal 2: Patient will able to express feelings of grief and loss regarding death of family member  and behavior indicating progression of grief process towards acceptance and coping with reality of loss with acknowledgement of permanent change  within 6 months    I will know I've met my goal when I won't worry about my own health and death.      Objective #A (Patient Action)    Status: New - Date: 9/18/2020     Patient will demonstrate/report improved family dynamics that can be safely managed in a lower LOC.  Client report of able to express feelings of grief and loss regarding death of family member and behavior indicating progression of grief process towards acceptance and coping with reality of loss with acknowledgement of permanent change within 6 months.    Intervention(s)  Therapist will provide individual therapy to process through grief and loss and to gain effective coping skills. Tx to discuss current stressors and interpersonal conflicts and how to cope with these, coaching, diagnostic testing, referral for medication as indicated, use prescribed medication, cognitive restructuring, interpersonal family therapy, supportive therapy services.      Patient has reviewed and agreed to the above plan.      Sudha Garay, [facility]  September 18, 2020

## 2020-12-04 ASSESSMENT — ANXIETY QUESTIONNAIRES: GAD7 TOTAL SCORE: 3

## 2020-12-07 NOTE — RESULT ENCOUNTER NOTE
Edgar Healy,    Thank you for your recent office visit.    Here are your recent results.  Your DOUGIE is positive, indicating that you could have an autoimmune issue causing your symptoms.  Please schedule with rheumatology at this time.     Feel free to contact me via [employer] or call the clinic at [phone].    Sincerely,    HIRA Tariq, FNP-BC

## 2020-12-10 ENCOUNTER — THERAPY VISIT (OUTPATIENT)
Dept: PHYSICAL THERAPY | Facility: CLINIC | Age: 40
End: 2020-12-10
Payer: COMMERCIAL

## 2020-12-10 DIAGNOSIS — M54.41 BILATERAL LOW BACK PAIN WITH RIGHT-SIDED SCIATICA: ICD-10-CM

## 2020-12-10 PROCEDURE — 97140 MANUAL THERAPY 1/> REGIONS: CPT | Mod: GP | Performed by: PHYSICAL THERAPIST

## 2020-12-10 PROCEDURE — 97110 THERAPEUTIC EXERCISES: CPT | Mod: GP | Performed by: PHYSICAL THERAPIST

## 2020-12-13 ENCOUNTER — MYC MEDICAL ADVICE (OUTPATIENT)
Dept: FAMILY MEDICINE | Facility: CLINIC | Age: 40
End: 2020-12-13

## 2020-12-14 NOTE — TELEPHONE ENCOUNTER
Routed to provider to please advise.    Tash BSN-RN  Triage Nurse  [facility]

## 2020-12-17 ENCOUNTER — THERAPY VISIT (OUTPATIENT)
Dept: PHYSICAL THERAPY | Facility: CLINIC | Age: 40
End: 2020-12-17
Payer: COMMERCIAL

## 2020-12-17 ENCOUNTER — VIRTUAL VISIT (OUTPATIENT)
Dept: PSYCHOLOGY | Facility: CLINIC | Age: 40
End: 2020-12-17
Payer: COMMERCIAL

## 2020-12-17 ENCOUNTER — VIRTUAL VISIT (OUTPATIENT)
Dept: FAMILY MEDICINE | Facility: CLINIC | Age: 40
End: 2020-12-17
Payer: COMMERCIAL

## 2020-12-17 DIAGNOSIS — L29.9 ITCHING: ICD-10-CM

## 2020-12-17 DIAGNOSIS — R21 RASH: ICD-10-CM

## 2020-12-17 DIAGNOSIS — L20.9 ATOPIC DERMATITIS, UNSPECIFIED TYPE: ICD-10-CM

## 2020-12-17 DIAGNOSIS — F41.1 GAD (GENERALIZED ANXIETY DISORDER): Primary | ICD-10-CM

## 2020-12-17 DIAGNOSIS — M54.41 BILATERAL LOW BACK PAIN WITH RIGHT-SIDED SCIATICA: ICD-10-CM

## 2020-12-17 DIAGNOSIS — Z11.52 ENCOUNTER FOR SCREENING LABORATORY TESTING FOR COVID-19 VIRUS: ICD-10-CM

## 2020-12-17 DIAGNOSIS — Z09 FOLLOW-UP EXAM: Primary | ICD-10-CM

## 2020-12-17 PROCEDURE — 86769 SARS-COV-2 COVID-19 ANTIBODY: CPT | Performed by: NURSE PRACTITIONER

## 2020-12-17 PROCEDURE — 86003 ALLG SPEC IGE CRUDE XTRC EA: CPT | Performed by: NURSE PRACTITIONER

## 2020-12-17 PROCEDURE — 99214 OFFICE O/P EST MOD 30 MIN: CPT | Mod: 95 | Performed by: NURSE PRACTITIONER

## 2020-12-17 PROCEDURE — 82785 ASSAY OF IGE: CPT | Performed by: NURSE PRACTITIONER

## 2020-12-17 PROCEDURE — 90834 PSYTX W PT 45 MINUTES: CPT | Mod: GT | Performed by: SOCIAL WORKER

## 2020-12-17 PROCEDURE — 97110 THERAPEUTIC EXERCISES: CPT | Mod: GP | Performed by: PHYSICAL THERAPIST

## 2020-12-17 PROCEDURE — 36415 COLL VENOUS BLD VENIPUNCTURE: CPT | Performed by: NURSE PRACTITIONER

## 2020-12-17 PROCEDURE — 97140 MANUAL THERAPY 1/> REGIONS: CPT | Mod: GP | Performed by: PHYSICAL THERAPIST

## 2020-12-17 PROCEDURE — 97112 NEUROMUSCULAR REEDUCATION: CPT | Mod: GP | Performed by: PHYSICAL THERAPIST

## 2020-12-17 ASSESSMENT — ANXIETY QUESTIONNAIRES
6. BECOMING EASILY ANNOYED OR IRRITABLE: SEVERAL DAYS
5. BEING SO RESTLESS THAT IT IS HARD TO SIT STILL: NOT AT ALL
4. TROUBLE RELAXING: NOT AT ALL
GAD7 TOTAL SCORE: 4
1. FEELING NERVOUS, ANXIOUS, OR ON EDGE: SEVERAL DAYS
7. FEELING AFRAID AS IF SOMETHING AWFUL MIGHT HAPPEN: SEVERAL DAYS
3. WORRYING TOO MUCH ABOUT DIFFERENT THINGS: NOT AT ALL
2. NOT BEING ABLE TO STOP OR CONTROL WORRYING: SEVERAL DAYS

## 2020-12-17 ASSESSMENT — PATIENT HEALTH QUESTIONNAIRE - PHQ9: SUM OF ALL RESPONSES TO PHQ QUESTIONS 1-9: 5

## 2020-12-17 NOTE — PROGRESS NOTES
"Monet You is a 40 year old female who is being evaluated via a billable video visit.      The patient has been notified of following:     \"This video visit will be conducted via a call between you and your physician/provider. We have found that certain health care needs can be provided without the need for an in-person physical exam.  This service lets us provide the care you need with a video conversation.  If a prescription is necessary we can send it directly to your pharmacy.  If lab work is needed we can place an order for that and you can then stop by our lab to have the test done at a later time.    Video visits are billed at different rates depending on your insurance coverage.  Please reach out to your insurance provider with any questions.    If during the course of the call the physician/provider feels a video visit is not appropriate, you will not be charged for this service.\"    Patient has given verbal consent for Video visit? Yes  How would you like to obtain your AVS? MyChart  If you are dropped from the video visit, the video invite should be resent to: Text to cell phone: [phone]  Will anyone else be joining your video visit? No    Subjective     Monet You is a 40 year old female who presents today via video visit for the following health issues:    HPI     Patient would like labs done for food allergies. Ongoing for years.        Video Start Time: 1038    Review of Systems   Constitutional, HEENT, cardiovascular, pulmonary, GI, , musculoskeletal, neuro, skin, endocrine and psych systems are negative, except as otherwise noted.      Objective           Vitals:  No vitals were obtained today due to virtual visit.    Physical Exam     GENERAL: Healthy, alert and no distress  EYES: Eyes grossly normal to inspection.  No discharge or erythema, or obvious scleral/conjunctival abnormalities.  RESP: No audible wheeze, cough, or visible cyanosis.  No visible retractions " or increased work of breathing.    SKIN: Visible skin clear. No significant rash, abnormal pigmentation or lesions.  NEURO: Cranial nerves grossly intact.  Mentation and speech appropriate for age.  PSYCH: Mentation appears normal, affect normal/bright, judgement and insight intact, normal speech and appearance well-groomed.      She would like food allergy testing for recurrent rash and itching, she would also like to have COVID antigen testing.         Assessment & Plan     Monet was seen today for lab request.    Diagnoses and all orders for this visit:    Follow-up exam    Rash  -     Allergy adult food panel; Future    Itching  -     Allergy adult food panel; Future    Encounter for screening laboratory testing for COVID-19 virus  -     COVID-19 Virus (Coronavirus) Antibody & Titer Reflex; Future    Atopic dermatitis, unspecified type            See Patient Instructions: advised MA will call to make a lab only visit. Follow up as needed for healthcare questions/ concerns.     Return if symptoms worsen or fail to improve.    ELIDA Lynn  [facility]      Video-Visit Details    Type of service:  Video Visit    Video End Time:10:47 AM    Originating Location (pt. Location): Home    Distant Location (provider location):  [facility]     Platform used for Video Visit: Elaine

## 2020-12-17 NOTE — PROGRESS NOTES
Progress Note    Patient Name: Monet You  Date: 12/17/2020         Service Type: Individual      Session Start Time: 1005  Session End Time: 1055     Session Length: 50    Session #: 6    Attendees: Client    Service Modality:  Video Yes, the patient's condition can be safely assessed and treated via synchronous audio and visual telemedicine encounter.      Reason for Video Visit: Services only offered telehealth    Location of Originating Site: Patient's home    Distant Site: Provider Remote Setting home office      Consent:  The patient/guardian has verbally consented to: the potential risks and benefits of telemedicine (video visit) versus in person care; bill my insurance or make self-payment for services provided; and responsibility for payment of non-covered services.      Mode of transmission-[employer]       Treatment Plan Last Reviewed: 9/18/2020 due 1/18/2021  PHQ-9 / HILL-7 : 5/4    DATA  Interactive Complexity: No  Crisis: No       Progress Since Last Session (Related to Symptoms / Goals / Homework):   Symptoms: Worsening increase HILL-7 PHQ-9    Homework: Achieved / completed to satisfaction      Episode of Care Goals: Minimal progress - PREPARATION (Decided to change - considering how); Intervened by negotiating a change plan and determining options / strategies for behavior change, identifying triggers, exploring social supports, and working towards setting a date to begin behavior change     Current / Ongoing Stressors and Concerns:   loss, trauma, stress      Treatment Objective(s) Addressed in This Session:   Patient will demonstrate and report a level of anxiety that can be managed at a lower level of care.  Absence of persistent anxious mood and report of reduced frequency and intensity of worry and absence of persistent anxious mood to acceptable levels, no panic attacks, report subjective comfort with rumination for a period of 90 days,  within 6 months as clinically observed and by patient self-report.  Patient will demonstrate/report improved family dynamics that can be safely managed in a lower LOC.  Client report of able to express feelings of grief and loss regarding death of family member and behavior indicating progression of grief process towards acceptance and coping with reality of loss with acknowledgement of permanent change within 6 months.     Intervention:   Therapist met with patient to review goals and interventions. Therapist utilized reflected listening as patient gave brief reflection of week. Patient processed work and loss of uncle. Therapist supported patient and utilized ACT modality with patient walking through patient timeline of loss and outcome. Therapist encouraged patient to continued to manage her anxiety and stress and reviewed effective coping skills. Patient presented with an anxious affect. Patient was engaged throughout session and open to feedback. Patient reported no safety concerns.           ASSESSMENT: Current Emotional / Mental Status (status of significant symptoms):   Risk status (Self / Other harm or suicidal ideation)   Patient denies current fears or concerns for personal safety.   Patient denies current or recent suicidal ideation or behaviors.   Patient denies current or recent homicidal ideation or behaviors.   Patient denies current or recent self injurious behavior or ideation.   Patient denies other safety concerns.   Patient reports there has been no change in risk factors since their last session.     Patient reports there has been no change in protective factors since their last session.     Recommended that patient call 911 or go to the local ED should there be a change in any of these risk factors.     Appearance:   Appropriate  video was dark   Eye Contact:   Fair  video was dark   Psychomotor Behavior: Restless  video was dark   Attitude:   Cooperative  Interested  Friendly   Orientation:   All   Speech    Rate / Production: Emotional Talkative    Volume:  Normal    Mood:    Anxious    Affect:    Worrisome    Thought Content:  Clear    Thought Form:  Coherent    Insight:    Fair       Medication Review:   No current psychiatric medications prescribed     Medication Compliance:   NA     Changes in Health Issues:   None reported     Chemical Use Review:   Substance Use: Chemical use reviewed, no active concerns identified      Tobacco Use: No current tobacco use.      Diagnosis:  1. HILL (generalized anxiety disorder)        Collateral Reports Completed:   Not Applicable    PLAN: (Patient Tasks / Therapist Tasks / Other)  Meditation yoga  Planning for aging    Sudha Garay, [facility] 12/17/2020                                                         ______________________________________________________________________    Treatment Plan    Patient's Name: Monet You  YOB: 1980    Date: 9/18/2020    DSM5 Diagnoses: 300.02 (F41.1) Generalized Anxiety Disorder  Psychosocial / Contextual Factors: loss, trauma, stress   WHODAS: 18    Referral / Collaboration:  Referral to another professional/service is not indicated at this time..    Anticipated number of session or this episode of care: 20      MeasurableTreatment Goal(s) related to diagnosis / functional impairment(s)  Goal 1: Patient will report absence of persistent anxiety mood and report of reduced frequency and intensity of worry and absence of persistent anxious mood to acceptable levels, no panic attacks, report subjective comfort with rumination for a period of 90 days. Within 6 months as clinically observed and by patient self-report.    I will know I've met my goal when light and free and not worry about everything.      Objective #A (Patient Action)    Patient will demonstrate and report a level of anxiety that can be managed at a lower level of care.  Absence of persistent anxious mood and  report of reduced frequency and intensity of worry and absence of persistent anxious mood to acceptable levels, no panic attacks, report subjective comfort with rumination for a period of 90 days, within 6 months as clinically observed and by patient self-report.  Status: New - Date: 9/18/2020     Intervention(s)  Therapist will provide individual therapy to identify triggers to anxiety, gain feedback on helpful coping tools and thought-reframing techniques, and identify preferred way of being. Tx to include discuss current stressors and interpersonal conflicts and how to cope with these, coaching, diagnostic testing , referral for medication as indicated, use prescribed medication, cognitive restructuring, interpersonal,   family therapy, supportive therapy services.    Goal 2: Patient will able to express feelings of grief and loss regarding death of family member  and behavior indicating progression of grief process towards acceptance and coping with reality of loss with acknowledgement of permanent change  within 6 months    I will know I've met my goal when I won't worry about my own health and death.      Objective #A (Patient Action)    Status: New - Date: 9/18/2020     Patient will demonstrate/report improved family dynamics that can be safely managed in a lower LOC.  Client report of able to express feelings of grief and loss regarding death of family member and behavior indicating progression of grief process towards acceptance and coping with reality of loss with acknowledgement of permanent change within 6 months.    Intervention(s)  Therapist will provide individual therapy to process through grief and loss and to gain effective coping skills. Tx to discuss current stressors and interpersonal conflicts and how to cope with these, coaching, diagnostic testing, referral for medication as indicated, use prescribed medication, cognitive restructuring, interpersonal family therapy, supportive therapy  services.      Patient has reviewed and agreed to the above plan.      Sudha Garay, [facility]  September 18, 2020

## 2020-12-18 ENCOUNTER — MYC MEDICAL ADVICE (OUTPATIENT)
Dept: FAMILY MEDICINE | Facility: CLINIC | Age: 40
End: 2020-12-18

## 2020-12-18 LAB
COVID-19 SPIKE RBD ABY TITER: NORMAL
COVID-19 SPIKE RBD ABY: NEGATIVE

## 2020-12-18 ASSESSMENT — ANXIETY QUESTIONNAIRES: GAD7 TOTAL SCORE: 4

## 2020-12-21 ENCOUNTER — MYC MEDICAL ADVICE (OUTPATIENT)
Dept: FAMILY MEDICINE | Facility: CLINIC | Age: 40
End: 2020-12-21

## 2020-12-21 NOTE — TELEPHONE ENCOUNTER
Routed to provider to please advise.    Tash BSN-RN  Triage Nurse  [facility]: [facility]

## 2020-12-21 NOTE — TELEPHONE ENCOUNTER
"See DOUGIE result note on 12/7/20:       Fartun Reid NP   12/7/2020 11:19 AM CST      Edgar Healy,     Thank you for your recent office visit.     Here are your recent results.  Your DOUGIE is positive, indicating that you could have an autoimmune issue causing your symptoms.  Please schedule with rheumatology at this time.      Feel free to contact me via [employer] or call the clinic at [phone].     Sincerely,     Fartun Reid, HIRA, [facility]-BC     I don't see rheumatology referral.    I called and spoke to patient, she says she wonders why it took so long to get a note back when the DOUGIE was done on 11/30?      Also, why is it flagged \"positive\" as the interpretation guide shows:    DOUGIE interpretation NEG^Negative PositiveAbnormal      Comment:                                    Reference range:   <1:40  NEGATIVE   1:40 - 1:80  BORDERLINE POSITIVE   >1:80 POSITIVE      Her value was 1:320 so is less than 1:40???      Routed to provider to address.    Allie Carty RN  [facility]        "

## 2020-12-23 LAB
ALMOND IGE QN: <0.1 KU(A)/L
CASHEW NUT IGE QN: <0.1 KU(A)/L
CODFISH IGE QN: <0.1 KU(A)/L
COW MILK IGE QN: <0.1 KU(A)/L
EGG WHITE IGE QN: <0.1 KU(A)/L
HAZELNUT IGE QN: <0.1 KU(A)/L
IGE SERPL-ACNC: 51 KIU/L (ref 0–114)
PEANUT IGE QN: <0.1 KU(A)/L
SALMON IGE QN: <0.1 KU(A)/L
SCALLOP IGE QN: <0.1 KU(A)/L
SESAME SEED IGE QN: <0.1 KU(A)/L
SHRIMP IGE QN: <0.1 KU(A)/L
SOYBEAN IGE QN: <0.1 KU(A)/L
TUNA IGE QN: <0.1 KU(A)/L
WALNUT IGE QN: <0.1 KU(A)/L
WHEAT IGE QN: <0.1 KU(A)/L

## 2020-12-23 NOTE — RESULT ENCOUNTER NOTE
Edgar Healy,    Thank you for your recent office visit.    Here are your recent results.  No food allergins identified by labs.     Feel free to contact me via [employer] or call the clinic at [phone].    Sincerely,    HIRA Tariq, FNP-BC

## 2020-12-28 ENCOUNTER — MYC MEDICAL ADVICE (OUTPATIENT)
Dept: FAMILY MEDICINE | Facility: CLINIC | Age: 40
End: 2020-12-28

## 2020-12-28 DIAGNOSIS — R76.8 ANA POSITIVE: Primary | ICD-10-CM

## 2020-12-28 DIAGNOSIS — M25.50 MULTIPLE JOINT PAIN: ICD-10-CM

## 2021-01-07 ENCOUNTER — THERAPY VISIT (OUTPATIENT)
Dept: PHYSICAL THERAPY | Facility: CLINIC | Age: 41
End: 2021-01-07
Payer: COMMERCIAL

## 2021-01-07 DIAGNOSIS — M54.41 BILATERAL LOW BACK PAIN WITH RIGHT-SIDED SCIATICA: ICD-10-CM

## 2021-01-07 PROCEDURE — 97530 THERAPEUTIC ACTIVITIES: CPT | Mod: GP | Performed by: PHYSICAL THERAPIST

## 2021-01-07 PROCEDURE — 97140 MANUAL THERAPY 1/> REGIONS: CPT | Mod: GP | Performed by: PHYSICAL THERAPIST

## 2021-01-07 PROCEDURE — 97110 THERAPEUTIC EXERCISES: CPT | Mod: GP | Performed by: PHYSICAL THERAPIST

## 2021-01-07 NOTE — PROGRESS NOTES
PROGRESS  REPORT    Progress reporting period is from 12/3/20 to 1/7/21.       SUBJECTIVE  Subjective changes noted by patient:  Patient reports her back has been feeling really good the past 3 weeks and hasn't been noticing the pain in the right leg; she reports she has been able to sit for her whole shift at work; she does report some end range pain with pressups still, but this seems to be improving. Patient feels comfortable continuing with HEP moving forward.    Current Pain level: 0/10.     Initial Pain level: 7/10.   Changes in function:  Yes (See Goal flowsheet attached for changes in current functional level)  Adverse reaction to treatment or activity: None    OBJECTIVE  Changes noted in objective findings:  AROM lumbar flexion WNL, ext WNL (end range tightness). Patient demonstrates good sitting posture     ASSESSMENT/PLAN  Updated problem list and treatment plan: Diagnosis 1:  Low back pain   Decreased strength - home program  Impaired muscle performance - home program  STG/LTGs have been met or progress has been made towards goals:  Yes (See Goal flow sheet completed today.)  Assessment of Progress: The patient's condition is improving.  The patient has met all of their long term goals.  Self Management Plans:  Patient has been instructed in a home treatment program.  Patient is independent in a home treatment program.  Patient  has been instructed in self management of symptoms.  Patient is independent in self management of symptoms.  I have re-evaluated this patient and find that the nature, scope, duration and intensity of the therapy is appropriate for the medical condition of the patient.  Monet continues to require the following intervention to meet STG and LTG's:  Continuation with HEP    Recommendations:  Patient is encouraged to call PT if any questions/concerns arise regarding HEP or if she experiences any exacerbation of symptoms in the near future. Will follow-up in 3-4 weeks if further  therapy is indicated by change in symptoms. Discharge from PT at that time.      Please refer to the daily flowsheet for treatment today, total treatment time and time spent performing 1:1 timed codes.

## 2021-01-14 ENCOUNTER — HEALTH MAINTENANCE LETTER (OUTPATIENT)
Age: 41
End: 2021-01-14

## 2021-01-14 ENCOUNTER — THERAPY VISIT (OUTPATIENT)
Dept: CHIROPRACTIC MEDICINE | Facility: CLINIC | Age: 41
End: 2021-01-14
Payer: COMMERCIAL

## 2021-01-14 DIAGNOSIS — M99.03 SEGMENTAL DYSFUNCTION OF LUMBAR REGION: ICD-10-CM

## 2021-01-14 DIAGNOSIS — M99.02 THORACIC SEGMENT DYSFUNCTION: ICD-10-CM

## 2021-01-14 DIAGNOSIS — M62.838 SPASM OF MUSCLE: ICD-10-CM

## 2021-01-14 DIAGNOSIS — M99.05 SEGMENTAL DYSFUNCTION OF PELVIC REGION: Primary | ICD-10-CM

## 2021-01-14 DIAGNOSIS — M54.2 CERVICALGIA: ICD-10-CM

## 2021-01-14 DIAGNOSIS — M99.01 CERVICAL SEGMENT DYSFUNCTION: ICD-10-CM

## 2021-01-14 DIAGNOSIS — M54.50 CHRONIC RIGHT-SIDED LOW BACK PAIN WITHOUT SCIATICA: ICD-10-CM

## 2021-01-14 DIAGNOSIS — G89.29 CHRONIC RIGHT-SIDED LOW BACK PAIN WITHOUT SCIATICA: ICD-10-CM

## 2021-01-14 PROCEDURE — 99204 OFFICE O/P NEW MOD 45 MIN: CPT | Mod: 25 | Performed by: CHIROPRACTOR

## 2021-01-14 PROCEDURE — 98941 CHIROPRACT MANJ 3-4 REGIONS: CPT | Mod: AT | Performed by: CHIROPRACTOR
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
## 2021-01-21 ENCOUNTER — THERAPY VISIT (OUTPATIENT)
Dept: CHIROPRACTIC MEDICINE | Facility: CLINIC | Age: 41
End: 2021-01-21
Payer: COMMERCIAL

## 2021-01-21 DIAGNOSIS — M99.01 CERVICAL SEGMENT DYSFUNCTION: ICD-10-CM

## 2021-01-21 DIAGNOSIS — M99.02 THORACIC SEGMENT DYSFUNCTION: ICD-10-CM

## 2021-01-21 DIAGNOSIS — M99.03 SEGMENTAL DYSFUNCTION OF LUMBAR REGION: ICD-10-CM

## 2021-01-21 DIAGNOSIS — M62.838 SPASM OF MUSCLE: ICD-10-CM

## 2021-01-21 DIAGNOSIS — M99.05 SEGMENTAL DYSFUNCTION OF PELVIC REGION: Primary | ICD-10-CM

## 2021-01-21 DIAGNOSIS — G89.29 CHRONIC BILATERAL LOW BACK PAIN WITHOUT SCIATICA: ICD-10-CM

## 2021-01-21 DIAGNOSIS — M54.50 CHRONIC BILATERAL LOW BACK PAIN WITHOUT SCIATICA: ICD-10-CM

## 2021-01-21 DIAGNOSIS — M54.2 CERVICALGIA: ICD-10-CM

## 2021-01-21 PROCEDURE — 98941 CHIROPRACT MANJ 3-4 REGIONS: CPT | Mod: AT | Performed by: CHIROPRACTOR

## 2021-01-21 NOTE — PROGRESS NOTES
Visit #:  2 of 8, based on treatment plan    Subjective:  Monet You is a 40 year old female who is seen in f/u up for:        Segmental dysfunction of pelvic region  Chronic bilateral low back pain without sciatica  Segmental dysfunction of lumbar region  Spasm of muscle  Thoracic segment dysfunction  Cervicalgia  Cervical segment dysfunction.     Since last visit on 1/14/2021,  Monet You reports the following changes: Pain immediately after last treatment: 4/10 and their pain level today 2/10.  Monet reports that she is feeling better just a little stiff in her low back and neck region. Overall she is feeling improved.    Area of chief complaint:  Cervical and Lumbar :  Symptoms are graded at 2/10. The quality is described as stiff, achey, dull.  Motion has increased, but is still not normal. Patient feels that they are improved due to a reduction in symptoms.        Objective:  The following was observed:    P: palpatory tendernessPiriformis and Traps     A: static palpation demonstrates intersegmental asymmetry , cervical, thoracic, lumbar, pelvis    R: motion palpation notes restricted motion, C6 , C7 , T1 , T2 , T6 , T7 , T8 , L4 , L5  and PSIS Right     T: The following soft tissue hypotonicities were observed:  Piriformis: right, ache, dull pain and stiff, referred pain: no  Traps: bilateral, ache, dull pain and stiff, referred pain: no      Assessment:    Segmental spinal dysfunction/restrictions found at:  C6   C7   T1   T2   T5  T6  T7  L4  L5  PSIS Right    Diagnoses:      1. Segmental dysfunction of pelvic region    2. Chronic bilateral low back pain without sciatica    3. Segmental dysfunction of lumbar region    4. Spasm of muscle    5. Thoracic segment dysfunction    6. Cervicalgia    7. Cervical segment dysfunction        Patient's condition:  Patient had restrictions pre-manipulation    Treatment effectiveness:  Post manipulation there is better intersegmental movement  and Patient claims to feel looser post manipulation      Procedures:  CMT:  05534 Chiropractic manipulative treatment 3-4 regions performed   Cervical: Diversified, C6, C7 , Prone  Thoracic: Diversified, T1, T2, T5, T6, T7, Prone  Lumbar: Diversified, L4, L5, Side posture  Pelvis: Drop Table, PSIS Right , Prone    Modalities:  05937: MSTM:  To Piriformis and Traps  for 5 min    Therapeutic procedures:  None      Prognosis: Good    Progress towards Goals: Patient is making progress towards the goal     Response to Treatment:   Reduction in symptoms as reported by patient      Recommendations:    Instructions:  stretch as instructed     Follow-up:   Return to care in one week

## 2021-01-28 ENCOUNTER — THERAPY VISIT (OUTPATIENT)
Dept: CHIROPRACTIC MEDICINE | Facility: CLINIC | Age: 41
End: 2021-01-28
Payer: COMMERCIAL

## 2021-01-28 DIAGNOSIS — G89.29 CHRONIC BILATERAL LOW BACK PAIN WITHOUT SCIATICA: ICD-10-CM

## 2021-01-28 DIAGNOSIS — M54.50 CHRONIC BILATERAL LOW BACK PAIN WITHOUT SCIATICA: ICD-10-CM

## 2021-01-28 DIAGNOSIS — M99.02 THORACIC SEGMENT DYSFUNCTION: ICD-10-CM

## 2021-01-28 DIAGNOSIS — M99.03 SEGMENTAL DYSFUNCTION OF LUMBAR REGION: ICD-10-CM

## 2021-01-28 DIAGNOSIS — M99.01 CERVICAL SEGMENT DYSFUNCTION: ICD-10-CM

## 2021-01-28 DIAGNOSIS — M54.2 CERVICALGIA: ICD-10-CM

## 2021-01-28 DIAGNOSIS — M99.05 SEGMENTAL DYSFUNCTION OF PELVIC REGION: Primary | ICD-10-CM

## 2021-01-28 DIAGNOSIS — M62.838 SPASM OF MUSCLE: ICD-10-CM

## 2021-01-28 PROCEDURE — 98941 CHIROPRACT MANJ 3-4 REGIONS: CPT | Mod: AT | Performed by: CHIROPRACTOR

## 2021-01-28 NOTE — PROGRESS NOTES
Visit #:  3 of 8, based on treatment plan    Subjective:  Monet You is a 40 year old female who is seen in f/u up for:        Segmental dysfunction of pelvic region  Chronic bilateral low back pain without sciatica  Segmental dysfunction of lumbar region  Spasm of muscle  Thoracic segment dysfunction  Cervicalgia  Cervical segment dysfunction.     Since last visit on 1/21/2021,  Monet You reports the following changes: Pain immediately after last treatment: 4/10 and their pain level today 4/10.  Monet reports that she was feeling better for about a week and then she started to have some stiffness in her low back and neck region, yesterday.  She also woke up this morning and felt like she was getting a headache.  Despite this overall she is feeling improved.    Area of chief complaint:  Cervical and Lumbar :  Symptoms are graded at 4/10. The quality is described as stiff, achey, dull.  Motion has increased, but is still not normal. Patient feels that they are improved due to a reduction in symptoms.        Objective:  The following was observed:    P: palpatory tendernessPiriformis and Traps     A: static palpation demonstrates intersegmental asymmetry , cervical, thoracic, lumbar, pelvis    R: motion palpation notes restricted motion, C1, C6 , C7 , T1 , T2 , T6 , T7 , T8 , L4 , L5  and PSIS Right     T: The following soft tissue hypotonicities were observed:  Piriformis: right, ache, dull pain and stiff, referred pain: no  Traps: bilateral, ache, dull pain and stiff, referred pain: no      Assessment:    Segmental spinal dysfunction/restrictions found at:  C1  C6   C7   T1   T2   T5  T6  T7  L4  L5  PSIS Right    Diagnoses:      1. Segmental dysfunction of pelvic region    2. Chronic bilateral low back pain without sciatica    3. Segmental dysfunction of lumbar region    4. Spasm of muscle    5. Thoracic segment dysfunction    6. Cervicalgia    7. Cervical segment dysfunction         Patient's condition:  Patient had restrictions pre-manipulation    Treatment effectiveness:  Post manipulation there is better intersegmental movement and Patient claims to feel looser post manipulation      Procedures:  CMT:  66269 Chiropractic manipulative treatment 3-4 regions performed   Cervical: Diversified/activator,C1 C6, C7 , Prone  Thoracic: Diversified, T1, T2, T5, T6, T7, Prone  Lumbar: Diversified, L4, L5, Side posture  Pelvis: Drop Table, PSIS Right , Prone    Modalities:  71985: MSTM:  To Piriformis and Traps  for 5 min    Therapeutic procedures:  None      Prognosis: Good    Progress towards Goals: Patient is making progress towards the goal     Response to Treatment:   Reduction in symptoms as reported by patient      Recommendations:    Instructions:  stretch as instructed     Follow-up:   Return to care in one week

## 2021-02-04 ENCOUNTER — THERAPY VISIT (OUTPATIENT)
Dept: CHIROPRACTIC MEDICINE | Facility: CLINIC | Age: 41
End: 2021-02-04
Payer: COMMERCIAL

## 2021-02-04 DIAGNOSIS — M54.2 CERVICALGIA: ICD-10-CM

## 2021-02-04 DIAGNOSIS — M99.02 THORACIC SEGMENT DYSFUNCTION: ICD-10-CM

## 2021-02-04 DIAGNOSIS — M54.50 BILATERAL LOW BACK PAIN WITHOUT SCIATICA, UNSPECIFIED CHRONICITY: ICD-10-CM

## 2021-02-04 DIAGNOSIS — M99.01 CERVICAL SEGMENT DYSFUNCTION: ICD-10-CM

## 2021-02-04 DIAGNOSIS — M62.838 SPASM OF MUSCLE: ICD-10-CM

## 2021-02-04 DIAGNOSIS — M99.03 SEGMENTAL DYSFUNCTION OF LUMBAR REGION: ICD-10-CM

## 2021-02-04 DIAGNOSIS — M99.05 SEGMENTAL DYSFUNCTION OF PELVIC REGION: Primary | ICD-10-CM

## 2021-02-04 PROCEDURE — 98941 CHIROPRACT MANJ 3-4 REGIONS: CPT | Mod: AT | Performed by: CHIROPRACTOR

## 2021-02-04 NOTE — PROGRESS NOTES
Visit #:  4 of 8, based on treatment plan    Subjective:  Monet Yuo is a 40 year old female who is seen in f/u up for:        Segmental dysfunction of pelvic region  Bilateral low back pain without sciatica, unspecified chronicity  Segmental dysfunction of lumbar region  Spasm of muscle  Thoracic segment dysfunction  Cervicalgia  Cervical segment dysfunction.     Since last visit on 1/28/2021,  Monet You reports the following changes: Pain immediately after last treatment: 4/10 and their pain level today 4/10.  Monet reports that she was feeling better, her low back is not as tender  But she does report that she is developing a headache today.    Area of chief complaint:  Cervical and Lumbar :  Symptoms are graded at 4/10. The quality is described as stiff, achey, dull.  Motion has increased, but is still not normal. Patient feels that they are improved due to a reduction in symptoms.        Objective:  The following was observed:    P: palpatory tendernessPiriformis and Traps     A: static palpation demonstrates intersegmental asymmetry , cervical, thoracic, lumbar, pelvis    R: motion palpation notes restricted motion, C1, C6 , C7 , T1 , T2 , T6 , T7 , T8 , L4 , L5  and PSIS Right     T: The following soft tissue hypotonicities were observed:  Piriformis: right, ache, dull pain and stiff, referred pain: no  Traps: bilateral, ache, dull pain and stiff, referred pain: no      Assessment:    Segmental spinal dysfunction/restrictions found at:  C1  C6   C7   T1   T2   T5  T6  T7  L4  L5  PSIS Right    Diagnoses:      1. Segmental dysfunction of pelvic region    2. Bilateral low back pain without sciatica, unspecified chronicity    3. Segmental dysfunction of lumbar region    4. Spasm of muscle    5. Thoracic segment dysfunction    6. Cervicalgia    7. Cervical segment dysfunction        Patient's condition:  Patient had restrictions pre-manipulation    Treatment effectiveness:  Post  manipulation there is better intersegmental movement and Patient claims to feel looser post manipulation      Procedures:  CMT:  88475 Chiropractic manipulative treatment 3-4 regions performed   Cervical: Diversified/activator,C1 C6, C7 , Prone  Thoracic: Diversified, T1, T2, T5, T6, T7, Prone  Lumbar: Diversified, L4, L5, Side posture  Pelvis: Drop Table, PSIS Right , Prone    Modalities:  05537: MSTM:  To Piriformis and Traps  for 5 min    Therapeutic procedures:  None      Prognosis: Good    Progress towards Goals: Patient is making progress towards the goal     Response to Treatment:   Reduction in symptoms as reported by patient      Recommendations:    Instructions:  stretch as instructed     Follow-up:   Return to care in one week

## 2021-02-08 NOTE — PROGRESS NOTES
RHEUMATOLOGY INITIAL VIDEO CONSULT NOTE    Chief Complaint:  polyarthralgia    Reason for consult: Fartun Reid from  has requested consultation for this patient for +DOUGIE and polyarthralgia.    Video visit may not be as thorough as an in-person visit and physical exam limitations may pose a challenge in formulating an accurate diagnosis.     History of Present Illness:    Monet You is a 40 year old female with pmhx anxiety is referred to rheumatology clinic for polyarthralgia and +DOUGIE. Pt reports pain in her R knee around a year ago while going up and down the stairs. Over the last few months, she has noticed some hip pain, wrist pain which is mild. For several years, she has had full body itching of unknown etiology. It started after her first pregnancy. She gets intermittent rashes or hives with hit but not always. She reports some weight loss and intermittent chest pains. She was evaluated in the ED last year with negative troponins and EKG. Her pain is under her L breast and sometimes it shoots up. It comes once a day, sometimes more. Feels like a sharp, shooting pain that is random and lasts a few seconds. Pain is not worse with breathing/deep breaths or positional. She has also noticed some fatigue throughout the day. She has been getting sore throats which never turns into anything. She works as a RN and notices at work, perhaps with mask wearing. She reports having increased anxiety. She has seen dermatology and allergist for her pruritis but a cause for her pruritis has not been identified. She has tried eliminating certain foods from her diet, currently off gluten, dairy, egg and soy. She has felt significant improvement in her pruritis since going on this diet. She has not had a biopsy of her skin issues - she has had bumps, sometimes hives and sometimes full body rash. She does notice her skin issues are worse with her anxiety. Last time she got some bumps on her skin, she was started  on doxycycline which resolved the rash. She associates that rash with eggs. She reports having sensitive skin. She gets hives in certain places in the cold. Does not necessarily have photosensitivity. Her joint pain is better with her restricted diet. No joint swelling. Denies AM stiffness. She does yoga in the AM. She does PT exercises as well for sciatica. Take ibuprofen and tylenol PRN, has not anything for pain in the last couple months.    Denies fevers, chills, +weight loss (~10-15 lbs over 3-5 months) in the setting of dietary restrictions and working out regularly, denies night sweats, vision changes, eye pain/redness, dry eyes, dry mouth, oral/nasal ulcers, hair loss/thinning, sinusitis, hearing loss, +intermittent fleeting rashes, denies raynaud's, cough, SOB, pleurisy, chest pain, edema, heartburn, difficulty swallowing, abdominal pain, +intermittent diarrhea with certain foods, denies hematochezia, melena, dysuria, recurrent genital ulcers, back pain, enthesitis, dactylitis, numbness, weakness, tingling. No hx of IBD. No hx of blood clots. +Reports 3, 1st trimester miscarriages.     Pertinent labs and imaging: (per chart review in [facility] and care everywhere)    Labs:   11/27/2020: +DOUGIE 1:320 speckled, unremarkable CBC w diff, CMP, ESR, CRP, TSH    Past Medical History:    Past Medical History:   Diagnosis Date     Encounter for insertion of mirena IUD 06/27/2016     Menarche age    cycles q     X    d     Past Surgical History:   Past Surgical History:   Procedure Laterality Date     D & C  5/06    miscarriage     Family History:   Denies family hx of SLE, Sjogren's syndrome, scleroderma, PsA, ankylosing spondylitis, psoriasis, vasculitis, gout, pseudogout.    Mother: RA    Alcohol use: has not had any alcohol since Summer 2020  Tobacco use: never  Occupational hx: works as RN  Sexual history: , currently sexually active  Contraception use: IUD    Allergies   Allergen Reactions     Ceclor [Cefaclor]  Hives     Parabens Itching     Reglan [Metoclopramide Hcl]      restless      Immunization History   Administered Date(s) Administered     FLU 6-35 months 10/20/2011, 01/07/2013     Influenza (IIV3) PF 10/20/2011, 01/07/2013     Influenza Vaccine IM > 6 months Valent IIV4 10/25/2017, 11/20/2017, 11/09/2018, 12/02/2019     TDAP Vaccine (Boostrix) 05/18/2012          Medications:  Current Outpatient Medications   Medication Sig Dispense Refill     levonorgestrel (MIRENA) 20 MCG/24HR IUD 1 each by Intrauterine route once.       Multiple Vitamin (MULTIVITAMIN OR) Take  by mouth daily.         PHYSICAL EXAMINATION: (limited as pt was evaluated via video visit)  Vital signs: (not taken due to evaluation via video visit)    Skin: no facial rashes  Pulm: non-labored respirations, no conversational dyspnea  MSK: no hand joint swelling, able to make a fist b/l, wrist ROM intact b/l, ROM in shoulder intact b/l      Labs:      I have reviewed all pertinent investigations including labs, including outside records if relevant    DOUGIE  Recent Labs   Lab Test 11/27/20 1403   SUSHIL Positive*   ANAP1 SPECKLED   ANAT1 1:320     CBC  Recent Labs   Lab Test 11/27/20  1403 03/03/20  1601 03/16/17  1055   WBC 6.7 8.6 7.8   RBC 4.52 4.40 4.45   HGB 14.5 14.1 14.1   HCT 43.3 42.3 42.0   MCV 96 96 94   RDW 12.6 11.9 12.4    282 271   MCH 32.1 32.0 31.7   MCHC 33.5 33.3 33.6   NEUTROPHIL 63.9 65.9 63.1   LYMPH 27.2 25.9 27.8   MONOCYTE 6.9 6.2 7.7   EOSINOPHIL 1.7 1.1 1.3   BASOPHIL 0.3 0.5 0.1   ANEU 4.3 5.7 5.0   ALYM 1.8 2.2 2.2   AMIRA 0.5 0.5 0.6   AEOS 0.1 0.1 0.1   ABAS 0.0 0.0 0.0     CMP  Recent Labs   Lab Test 11/27/20  1403 03/09/20  1232 03/03/20  1601 03/16/17  1055 05/26/16  0849     --  141 138  --    POTASSIUM 4.2  --  3.9 3.9  --    CHLORIDE 105  --  112* 103  --    CO2 28  --  25 27  --    ANIONGAP 7  --  4 8  --    GLC 89  --  110* 83 92   BUN 10  --  13 16  --    CR 0.69  --  0.71 0.68  --    GFRESTIMATED >90   --  >90 >90  Non African American GFR Calc    --    GFRESTBLACK >90  --  >90 >90  African American GFR Calc    --    DANIELLE 9.1  --  8.8 8.9  --    BILITOTAL 0.8 0.6  --  0.5  --    ALBUMIN 4.3 4.1  --  4.1  --    PROTTOTAL 7.8 7.8  --  7.9  --    ALKPHOS 72 72  --  88  --    AST 11 10  --  11  --    ALT 23 22  --  23  --      Iron Studies  Recent Labs   Lab Test 11/27/20  1403 03/09/20  1232   MARY 117 90   IRON 142  --      --    IRONSAT 43  --      Calcium/VitaminD  Recent Labs   Lab Test 11/27/20  1403 03/09/20  1232 03/03/20  1601 03/16/17  1055   DANIELLE 9.1  --  8.8 8.9   VITDT 36 45  --   --      ESR/CRP  Recent Labs   Lab Test 11/27/20  1403 03/09/20  1232   SED 7 6   CRP <2.9 <2.9     TSH/T4  Recent Labs   Lab Test 11/27/20  1403 03/09/20  1232 03/16/17  1055   TSH 2.66 2.49 2.97     Lipid Panel  Recent Labs   Lab Test 07/15/20  0944 05/26/16  0849   CHOL 166 153   TRIG 62 72   HDL 60 45*   LDL 94 94   NHDL 106 108     UA  Recent Labs   Lab Test 12/10/19  1227   COLOR Yellow   APPEARANCE Clear   URINEGLC Negative   URINEBILI Negative   SG 1.015   URINEPH 6.5   PROTEIN Negative   UROBILINOGEN 0.2   NITRITE Negative   UBLD Negative   LEUKEST Negative       Imaging:      I have reviewed all pertinent investigations including imaging, including outside records if relevant    CXR (3/3/01247  Findings:   2 views of the chest are obtained demonstrating unremarkable soft  tissues and no acute bony abnormalities. Cardiomediastinal borders are  clear. No enlargement of the cardiac silhouette. No appreciable  pneumothorax or pleural effusions. No focal airspace opacities.                                                                      Impression: No acute cardiac or pulmonary abnormalities.     Assessment / Plan:    Monet You is a 40 year old female with pmhx anxiety is referred to rheumatology clinic for polyarthralgia and +DOUGIE. Any prior notes, outside records, laboratory results, and imaging  studies were reviewed if relevant. Pertinent work-up thus far includes +DOUGIE 1:320 speckled, unremarkable CBC w diff, CMP, ESR, CRP, TSH. Her arthralgia do not sound inflammatory in nature, no AM stiffness, has not had joint swelling. She has multitude of other symptoms such as generalized pruritis, intermittent rashes of various types, fatigue, diarrhea with some weight loss and anxiety. No sicca symptoms, pleurisy, raynaud's, photosensitivity, mucocutaneous ulcers, hair loss, or cytopenias. We discussed that a positive DOUGIE can be seen in a variety of different diseases and syndrome.  The anti-nuclear antibody is a screening test for auto-immune disease.  Depending on the level or titer, it may be seen in autoimmune diseases such as SLE, sjogren's, scleroderma but could also be seen in auto-immune hepatitis, hypothyroidism and various other disease.  A positive DOUGIE does not give one a conclusive diagnosis of SLE as it may be a false positive and may not be significant as well.  Further w/u for auto-immune disease can be considered based on clinical symptoms and other laboratory findings.      Given the positive titer, non-specific symptoms, hx of miscarriages, we discussed obtaining further evaluation for her positive DOUGIE. She is advised to call dermatology if she has any rashes. She has found improvement in her pruritis, arthralgia and diarrhea with elimination of certain types of food. I would recommend that she follows up with PCP for evaluation of diarrhea and weight loss and possible celiac disease.    1) +DOUGIE 1:320, polyarthralgia  -check RF, CCP, ELENI, dsDNA, c3, c4, UA, UPC, APL abs, SHANELL, SPEP  -check CXR to evaluate for any pleural effusions. Her sharp/shooting momentary pain under L breast is not consistent with pleurisy. Recommend evaluation by PCP if persists  -vitamin D level 36 on 11/27/2020    2) Diarrhea and weight loss  -recommend follow-up with PCP    Ms. You verbalized agreement with and  understanding of the rational for the diagnosis and treatment plan.  All questions were answered to best of my ability and the patient's satisfaction. Ms. You was advised to contact the clinic with any questions that may arise after the clinic visit.      RTC pending test results    Zuri Welch MD

## 2021-02-09 ENCOUNTER — VIRTUAL VISIT (OUTPATIENT)
Dept: RHEUMATOLOGY | Facility: CLINIC | Age: 41
End: 2021-02-09
Attending: NURSE PRACTITIONER
Payer: COMMERCIAL

## 2021-02-09 DIAGNOSIS — R76.8 ANA POSITIVE: ICD-10-CM

## 2021-02-09 DIAGNOSIS — M25.50 MULTIPLE JOINT PAIN: ICD-10-CM

## 2021-02-09 PROCEDURE — 99204 OFFICE O/P NEW MOD 45 MIN: CPT | Mod: 95 | Performed by: STUDENT IN AN ORGANIZED HEALTH CARE EDUCATION/TRAINING PROGRAM

## 2021-02-09 NOTE — PATIENT INSTRUCTIONS
-Please make an appointment for lab and x-rays at any [facility] location near you  -I will be in touch once all test results are back  -Follow-up pending test results

## 2021-02-09 NOTE — PROGRESS NOTES
Monet is a 40 year old who is being evaluated via a billable video visit.      How would you like to obtain your AVS? CloudSharehart  If the video visit is dropped, the invitation should be resent by: Text to cell phone: [phone]  Will anyone else be joining your video visit? No      Video Start Time: 12:15  Video-Visit Details    Type of service:  Video Visit    Video End Time:1:10    Originating Location (pt. Location): Home    Distant Location (provider location):  [facility] [place]     Platform used for Video Visit: ShantellWell

## 2021-02-15 ENCOUNTER — ANCILLARY PROCEDURE (OUTPATIENT)
Dept: GENERAL RADIOLOGY | Facility: CLINIC | Age: 41
End: 2021-02-15
Attending: STUDENT IN AN ORGANIZED HEALTH CARE EDUCATION/TRAINING PROGRAM
Payer: COMMERCIAL

## 2021-02-15 DIAGNOSIS — R76.8 ANA POSITIVE: ICD-10-CM

## 2021-02-15 PROCEDURE — 71046 X-RAY EXAM CHEST 2 VIEWS: CPT | Performed by: RADIOLOGY

## 2021-02-16 DIAGNOSIS — R76.8 ANA POSITIVE: ICD-10-CM

## 2021-02-16 DIAGNOSIS — M25.50 MULTIPLE JOINT PAIN: ICD-10-CM

## 2021-02-16 LAB
ALBUMIN UR-MCNC: NEGATIVE MG/DL
APPEARANCE UR: CLEAR
BACTERIA #/AREA URNS HPF: ABNORMAL /HPF
BILIRUB UR QL STRIP: NEGATIVE
COLOR UR AUTO: ABNORMAL
CREAT UR-MCNC: 14 MG/DL
DAT POLY-SP REAG RBC QL: NORMAL
GLUCOSE UR STRIP-MCNC: NEGATIVE MG/DL
HGB UR QL STRIP: NEGATIVE
KETONES UR STRIP-MCNC: NEGATIVE MG/DL
LEUKOCYTE ESTERASE UR QL STRIP: ABNORMAL
NITRATE UR QL: NEGATIVE
PH UR STRIP: 7 PH (ref 5–7)
PROT UR-MCNC: <0.05 G/L
PROT/CREAT 24H UR: NORMAL G/G CR (ref 0–0.2)
RBC #/AREA URNS AUTO: 2 /HPF (ref 0–2)
SOURCE: ABNORMAL
SP GR UR STRIP: 1 (ref 1–1.03)
SQUAMOUS #/AREA URNS AUTO: 2 /HPF (ref 0–1)
UROBILINOGEN UR STRIP-MCNC: NORMAL MG/DL (ref 0–2)
WBC #/AREA URNS AUTO: 3 /HPF (ref 0–5)

## 2021-02-16 PROCEDURE — 36415 COLL VENOUS BLD VENIPUNCTURE: CPT | Performed by: STUDENT IN AN ORGANIZED HEALTH CARE EDUCATION/TRAINING PROGRAM

## 2021-02-16 PROCEDURE — 86235 NUCLEAR ANTIGEN ANTIBODY: CPT | Performed by: STUDENT IN AN ORGANIZED HEALTH CARE EDUCATION/TRAINING PROGRAM

## 2021-02-16 PROCEDURE — 86225 DNA ANTIBODY NATIVE: CPT | Performed by: STUDENT IN AN ORGANIZED HEALTH CARE EDUCATION/TRAINING PROGRAM

## 2021-02-16 PROCEDURE — 86200 CCP ANTIBODY: CPT | Performed by: STUDENT IN AN ORGANIZED HEALTH CARE EDUCATION/TRAINING PROGRAM

## 2021-02-16 PROCEDURE — 81001 URINALYSIS AUTO W/SCOPE: CPT | Performed by: STUDENT IN AN ORGANIZED HEALTH CARE EDUCATION/TRAINING PROGRAM

## 2021-02-16 PROCEDURE — 86147 CARDIOLIPIN ANTIBODY EA IG: CPT | Performed by: STUDENT IN AN ORGANIZED HEALTH CARE EDUCATION/TRAINING PROGRAM

## 2021-02-16 PROCEDURE — 86160 COMPLEMENT ANTIGEN: CPT | Performed by: STUDENT IN AN ORGANIZED HEALTH CARE EDUCATION/TRAINING PROGRAM

## 2021-02-16 PROCEDURE — 85613 RUSSELL VIPER VENOM DILUTED: CPT | Performed by: STUDENT IN AN ORGANIZED HEALTH CARE EDUCATION/TRAINING PROGRAM

## 2021-02-16 PROCEDURE — 86431 RHEUMATOID FACTOR QUANT: CPT | Performed by: STUDENT IN AN ORGANIZED HEALTH CARE EDUCATION/TRAINING PROGRAM

## 2021-02-16 PROCEDURE — 84156 ASSAY OF PROTEIN URINE: CPT | Performed by: STUDENT IN AN ORGANIZED HEALTH CARE EDUCATION/TRAINING PROGRAM

## 2021-02-16 PROCEDURE — 85730 THROMBOPLASTIN TIME PARTIAL: CPT | Performed by: STUDENT IN AN ORGANIZED HEALTH CARE EDUCATION/TRAINING PROGRAM

## 2021-02-16 PROCEDURE — 84165 PROTEIN E-PHORESIS SERUM: CPT | Mod: 26 | Performed by: PATHOLOGY

## 2021-02-16 PROCEDURE — 999N001036 HC STATISTIC TOTAL PROTEIN: Performed by: STUDENT IN AN ORGANIZED HEALTH CARE EDUCATION/TRAINING PROGRAM

## 2021-02-16 PROCEDURE — 86146 BETA-2 GLYCOPROTEIN ANTIBODY: CPT | Performed by: STUDENT IN AN ORGANIZED HEALTH CARE EDUCATION/TRAINING PROGRAM

## 2021-02-16 PROCEDURE — 999N001035 HC STATISTIC THROMBIN TIME NC: Performed by: STUDENT IN AN ORGANIZED HEALTH CARE EDUCATION/TRAINING PROGRAM

## 2021-02-16 PROCEDURE — 999N001023 HC STATISTIC INR NC: Performed by: STUDENT IN AN ORGANIZED HEALTH CARE EDUCATION/TRAINING PROGRAM

## 2021-02-16 PROCEDURE — 86880 COOMBS TEST DIRECT: CPT | Performed by: STUDENT IN AN ORGANIZED HEALTH CARE EDUCATION/TRAINING PROGRAM

## 2021-02-16 PROCEDURE — 84165 PROTEIN E-PHORESIS SERUM: CPT | Mod: TC | Performed by: STUDENT IN AN ORGANIZED HEALTH CARE EDUCATION/TRAINING PROGRAM

## 2021-02-17 LAB
B2 GLYCOPROT1 IGG SERPL IA-ACNC: <0.6 U/ML
B2 GLYCOPROT1 IGM SERPL IA-ACNC: <2.9 U/ML
C4 SERPL-MCNC: 15 MG/DL (ref 13–39)
CCP AB SER IA-ACNC: 2 U/ML
CENTROMERE IGG SER-ACNC: <0.2 AI (ref 0–0.9)
DSDNA AB SER-ACNC: 1 IU/ML
ENA RNP IGG SER IA-ACNC: <0.2 AI (ref 0–0.9)
ENA SM IGG SER-ACNC: <0.2 AI (ref 0–0.9)
ENA SS-A IGG SER IA-ACNC: <0.2 AI (ref 0–0.9)
ENA SS-B IGG SER IA-ACNC: <0.2 AI (ref 0–0.9)
LA PPP-IMP: NEGATIVE
RHEUMATOID FACT SER NEPH-ACNC: <7 IU/ML (ref 0–20)

## 2021-02-18 LAB
ALBUMIN SERPL ELPH-MCNC: 4.7 G/DL (ref 3.7–5.1)
ALPHA1 GLOB SERPL ELPH-MCNC: 0.3 G/DL (ref 0.2–0.4)
ALPHA2 GLOB SERPL ELPH-MCNC: 0.6 G/DL (ref 0.5–0.9)
B-GLOBULIN SERPL ELPH-MCNC: 0.9 G/DL (ref 0.6–1)
C3 SERPL-MCNC: 113 MG/DL (ref 81–157)
CARDIOLIPIN ANTIBODY IGG: <1.6 GPL-U/ML (ref 0–19.9)
CARDIOLIPIN ANTIBODY IGM: <0.2 MPL-U/ML (ref 0–19.9)
GAMMA GLOB SERPL ELPH-MCNC: 1.3 G/DL (ref 0.7–1.6)
M PROTEIN SERPL ELPH-MCNC: 0 G/DL
PROT PATTERN SERPL ELPH-IMP: NORMAL

## 2021-02-19 NOTE — RESULT ENCOUNTER NOTE
Dear Monet,     Your labs returned unremarkable. No antibodies to suggest lupus, Sjogren's syndrome, rheumatoid arthritis or scleroderma. Urine without any abnormal protein. Suspicion for an underlying connective tissue is low based on these negative results. You can follow-up with me as needed.    Please let us know if you have any questions or concerns.    Regards,  Zuri Welch MD

## 2021-02-19 NOTE — RESULT ENCOUNTER NOTE
Dear Monet,     Your chest x-ray was negative. No fluid seen.    Please let us know if you have any questions or concerns.    Regards,  Zuri Welch MD

## 2021-02-25 ENCOUNTER — TRANSFERRED RECORDS (OUTPATIENT)
Dept: HEALTH INFORMATION MANAGEMENT | Facility: CLINIC | Age: 41
End: 2021-02-25

## 2021-02-25 ENCOUNTER — ANCILLARY PROCEDURE (OUTPATIENT)
Dept: MAMMOGRAPHY | Facility: CLINIC | Age: 41
End: 2021-02-25
Payer: COMMERCIAL

## 2021-02-25 DIAGNOSIS — Z12.31 VISIT FOR SCREENING MAMMOGRAM: ICD-10-CM

## 2021-02-25 PROCEDURE — 77067 SCR MAMMO BI INCL CAD: CPT | Mod: TC | Performed by: RADIOLOGY

## 2021-03-03 ENCOUNTER — TELEPHONE (OUTPATIENT)
Dept: FAMILY MEDICINE | Facility: CLINIC | Age: 41
End: 2021-03-03

## 2021-03-03 NOTE — TELEPHONE ENCOUNTER
Signature needed on plan of care for physical Therapy. Form placed in Fartun's in box for signature in absence of Bianka Villaseñor.

## 2021-03-25 ENCOUNTER — VIRTUAL VISIT (OUTPATIENT)
Dept: FAMILY MEDICINE | Facility: CLINIC | Age: 41
End: 2021-03-25
Payer: COMMERCIAL

## 2021-03-25 DIAGNOSIS — H60.392 OTHER INFECTIVE ACUTE OTITIS EXTERNA OF LEFT EAR: Primary | ICD-10-CM

## 2021-03-25 PROCEDURE — 99213 OFFICE O/P EST LOW 20 MIN: CPT | Mod: 95 | Performed by: PHYSICIAN ASSISTANT

## 2021-03-25 RX ORDER — CIPROFLOXACIN AND DEXAMETHASONE 3; 1 MG/ML; MG/ML
4 SUSPENSION/ DROPS AURICULAR (OTIC) 2 TIMES DAILY
Qty: 2.8 ML | Refills: 0 | Status: SHIPPED | OUTPATIENT
Start: 2021-03-25 | End: 2021-04-01

## 2021-03-25 NOTE — PROGRESS NOTES
Monet is a 40 year old who is being evaluated via a billable video visit.      How would you like to obtain your AVS? MyChart  If the video visit is dropped, the invitation should be resent by: Text to cell phone: [phone]  Will anyone else be joining your video visit? No      Video Start Time: 7:28am      Assessment & Plan     1. Other infective acute otitis externa of left ear        Start Ciprodex ear drops BID x7 days. Supportive therapy also discussed. Follow up if symptoms fail to improve or worsen.               Return in about 1 week (around 4/1/2021), or if symptoms worsen or fail to improve.    JUANY Gatica [facility] DANDY Healy is a 40 year old who presents for the following health issues     HPI     Acute Illness  Acute illness concerns: ear pain  Onset/Duration: x1 night  Symptoms:  Fever: no  Chills/Sweats: no  Headache (location?): no  Sinus Pressure: YES  Conjunctivitis:  no  Ear Pain: YES: left  Rhinorrhea: YES  Congestion: YES  Sore Throat: no  Cough: YES  Wheeze: no  Decreased Appetite: no  Nausea: no  Vomiting: no  Diarrhea: no  Dysuria/Freq.: no  Dysuria or Hematuria: no  Fatigue/Achiness: no  Sick/Strep Exposure: no  Therapies tried and outcome: None    No Covid exposure  Nurse at [employer]   Was getting eternal ear infections due to itching her eczema  Ear canal feels swollen, tenderness of Tragus, hurt to sleep on that ear      Review of Systems   Constitutional, HEENT, cardiovascular, pulmonary, gi and gu systems are negative, except as otherwise noted.      Objective           Vitals:  No vitals were obtained today due to virtual visit.    Physical Exam   GENERAL: alert and no distress  EYES: Eyes grossly normal to inspection.  No discharge or erythema, or obvious scleral/conjunctival abnormalities.  RESP: No audible wheeze, cough, or visible cyanosis.  No visible retractions or increased work of breathing.    SKIN: Visible skin clear.  No significant rash, abnormal pigmentation or lesions.  NEURO: Cranial nerves grossly intact.  Mentation and speech appropriate for age.  PSYCH: Mentation appears normal, affect normal/bright, judgement and insight intact, normal speech and appearance well-groomed.          Video-Visit Details    Type of service:  Video Visit    Video End Time: 7:34am, 6 mins    Originating Location (pt. Location): Home    [facility]     Platform used for Video Visit: Elaine

## 2021-03-27 ENCOUNTER — NURSE TRIAGE (OUTPATIENT)
Dept: NURSING | Facility: CLINIC | Age: 41
End: 2021-03-27

## 2021-03-27 NOTE — TELEPHONE ENCOUNTER
Pt is calling.    On Ciprodex drops for an ear infection. Was seen on 03/25/2021 and started on the antibiotic ear drops. Has taken 4 doses now. She feels like it is getting worse. Swollen behind her left ear. She stated that she feels like it is getting worse since starting the drops.  No fever. Positive for COVID-19. Denies fever or discharge from her ear. No redness seen.   She stated that she hasn't taken anything for the pain.   Care advice reviewed. I encouraged her to take Ibuprofen every 6 hours as needed for the pain. May alternate with Tylenol if not getting relief.  Cold pack or cold wash cloth to the area.   I advised her to go to urgent care when open this morning for evaluation. She verbalized understanding.    COVID 19 Nurse Triage Plan/Patient Instructions    Please be aware that novel coronavirus (COVID-19) may be circulating in the community. If you develop symptoms such as fever, cough, or SOB or if you have concerns about the presence of another infection including coronavirus (COVID-19), please contact your health care provider or visit [email or web address].     Disposition/Instructions    In-Person Visit with provider recommended. Reference Visit Selection Guide.    Thank you for taking steps to prevent the spread of this virus.  o Limit your contact with others.  o Wear a simple mask to cover your cough.  o Wash your hands well and often.    Resources    [facility]: About COVID-19: [email or web address]    CDC: What to Do If You're Sick: www.cdc.gov/coronavirus/2019-ncov/about/steps-when-sick.html    CDC: Ending Home Isolation: www.cdc.gov/coronavirus/2019-ncov/hcp/disposition-in-home-patients.html     CDC: Caring for Someone: www.cdc.gov/coronavirus/2019-ncov/if-you-are-sick/care-for-someone.html     [facility]: Interim Guidance for Hospital Discharge to Home: www.health.[facility].[place].us/diseases/coronavirus/hcp/hospdischarge.pdf    [facility] clinical trials  (COVID-19 research studies): [email or web address].[email or web address]-clinical-trials     Below are the COVID-19 hotlines at the [place] Department of Health ([facility]). Interpreters are available.   o For health questions: Call 554-136-4730 or 1-489.156.8779 (7 a.m. to 7 p.m.)  o For questions about schools and childcare: Call 404-721-5928 or 1-367.773.7471 (7 a.m. to 7 p.m.)     Additional Information    Negative: Moving the earlobe or touching the ear clearly increases the pain    Negative: Foreign body struck in the ear (e.g., bug, piece of cotton)    Negative: Followed an ear injury    Negative: [1] Recently diagnosed with otitis media AND [2] currently on oral antibiotics    Negative: [1] Stiff neck (unable to touch chin to chest) AND [2] fever    Negative: [1] Bony area of skull behind the ear is pink or swollen AND [2] fever    Negative: Fever > 104 F (40 C)    Negative: Patient sounds very sick or weak to the triager    Negative: [1] SEVERE pain AND [2] not improved 2 hours after taking analgesic medication (e.g., ibuprofen or acetaminophen)    Negative: Walking is very unsteady    Negative: Sudden onset of ear pain after long - thin object was inserted into the ear canal (e.g., pencil, Q-tip)    Negative: Diabetes mellitus or weak immune system (e.g., HIV positive, cancer chemo, splenectomy, organ transplant, chronic steroids)    Negative: New blurred vision or vision changes    Earache  (Exceptions: brief ear pain of < 60 minutes duration, earache occurring during air travel    Negative: White, yellow, or green discharge    Negative: Bloody discharge or unexplained bleeding from ear canal    Protocols used: EARACHE-A-SHAUNA Nelson RN  [facility] Nurse Advisor  3/27/2021 at 3:28 AM

## 2021-04-05 ENCOUNTER — TELEPHONE (OUTPATIENT)
Dept: FAMILY MEDICINE | Facility: CLINIC | Age: 41
End: 2021-04-05

## 2021-05-13 DIAGNOSIS — Z01.84 ANTIBODY RESPONSE EXAMINATION: ICD-10-CM

## 2021-05-13 PROCEDURE — 86769 SARS-COV-2 COVID-19 ANTIBODY: CPT | Performed by: PHYSICIAN ASSISTANT

## 2021-05-13 PROCEDURE — 36415 COLL VENOUS BLD VENIPUNCTURE: CPT | Performed by: PHYSICIAN ASSISTANT

## 2021-05-13 PROCEDURE — 86769 SARS-COV-2 COVID-19 ANTIBODY: CPT | Mod: 59 | Performed by: PHYSICIAN ASSISTANT

## 2021-05-14 ENCOUNTER — MYC MEDICAL ADVICE (OUTPATIENT)
Dept: FAMILY MEDICINE | Facility: CLINIC | Age: 41
End: 2021-05-14

## 2021-05-14 LAB
SARS-COV-2 AB PNL SERPL IA: POSITIVE
SARS-COV-2 IGG SERPL IA-ACNC: NORMAL

## 2021-05-20 ENCOUNTER — TELEPHONE (OUTPATIENT)
Dept: NURSING | Facility: CLINIC | Age: 41
End: 2021-05-20

## 2021-05-20 NOTE — TELEPHONE ENCOUNTER
Request for 2nd COVID-19 vaccination due to 1st dose being received patient has not received 1st dose of Pfizer/Moderna- (STOP) Do not place order. Advise patient to make a 1st dose appointment through [facility]     Connected to scheduling.    Stacy Valdez RN/[facility] Nurse Advisor

## 2021-06-07 NOTE — PROGRESS NOTES
SUBJECTIVE: Here for curbside evaluation for COVID-19 referred through [facility]. Confirmed [facility] bad with name and date of birth for specimen collection.   Patient reports no new symptoms.     OBJECTIVE:   In no apparent distress  Eyes appear normal  Mucous membranes moist  Non diaphoretic   No increased work of breathing   Mental status appears normal/affect normal       1. Cough      Over the counter meds and isolation discussed until results in from [facility] team.  Brief education provided. Time of visit was 15 minutes more than half in coordination of care and counseling regarding COVID and self-isolation.      HIRA Hernandez, CNP

## 2021-06-08 ENCOUNTER — OFFICE VISIT (OUTPATIENT)
Dept: FAMILY MEDICINE | Facility: CLINIC | Age: 41
End: 2021-06-08
Payer: COMMERCIAL

## 2021-06-08 VITALS
BODY MASS INDEX: 21.16 KG/M2 | RESPIRATION RATE: 16 BRPM | HEART RATE: 85 BPM | WEIGHT: 119.4 LBS | DIASTOLIC BLOOD PRESSURE: 84 MMHG | HEIGHT: 63 IN | SYSTOLIC BLOOD PRESSURE: 115 MMHG | TEMPERATURE: 98 F

## 2021-06-08 DIAGNOSIS — Z00.01 ENCOUNTER FOR ROUTINE ADULT MEDICAL EXAM WITH ABNORMAL FINDINGS: Primary | ICD-10-CM

## 2021-06-08 DIAGNOSIS — M79.644 THUMB PAIN, RIGHT: ICD-10-CM

## 2021-06-08 DIAGNOSIS — Z30.433 ENCOUNTER FOR REMOVAL AND REINSERTION OF INTRAUTERINE CONTRACEPTIVE DEVICE (IUD): ICD-10-CM

## 2021-06-08 DIAGNOSIS — R07.9 CHEST PAIN, UNSPECIFIED TYPE: ICD-10-CM

## 2021-06-08 PROCEDURE — 99214 OFFICE O/P EST MOD 30 MIN: CPT | Mod: 25 | Performed by: PHYSICIAN ASSISTANT

## 2021-06-08 PROCEDURE — 99396 PREV VISIT EST AGE 40-64: CPT | Performed by: PHYSICIAN ASSISTANT

## 2021-06-08 PROCEDURE — 87624 HPV HI-RISK TYP POOLED RSLT: CPT | Performed by: PHYSICIAN ASSISTANT

## 2021-06-08 PROCEDURE — G0145 SCR C/V CYTO,THINLAYER,RESCR: HCPCS | Performed by: PHYSICIAN ASSISTANT

## 2021-06-08 ASSESSMENT — MIFFLIN-ST. JEOR: SCORE: 1163.71

## 2021-06-08 NOTE — PROGRESS NOTES
SUBJECTIVE:   CC: Monet You is an 41 year old woman who presents for preventive health visit.       Patient has been advised of split billing requirements and indicates understanding: Yes  Healthy Habits:    Do you get at least three servings of calcium containing foods daily (dairy, green leafy vegetables, etc.)? yes    Amount of exercise or daily activities, outside of work: 7 day(s) per week    Problems taking medications regularly No    Medication side effects: No    Have you had an eye exam in the past two years? yes    Do you see a dentist twice per year? yes    Do you have sleep apnea, excessive snoring or daytime drowsiness?no     The 10-year ASCVD risk score (Gasper VELA Jr., et al., 2013) is: 0.3%    Values used to calculate the score:      Age: 41 years      Sex: Female      Is Non- : No      Diabetic: No      Tobacco smoker: No      Systolic Blood Pressure: 115 mmHg      Is BP treated: No      HDL Cholesterol: 60 mg/dL      Total Cholesterol: 166 mg/dL     PROBLEMS TO ADD ON...  Mole check    Itching all over body-on going  Cut out numerous things - itching got a lot better, resolved  Seems to be something food related    Due for IUD removal- unsure    Chest pain on/off x 1yr  Twinges that last for seconds  Underneath left breast    Right thumb soreness x6 months   Would like a referral to physical therapy     check vericos veins   Spider veins thighs   No pain  -------------------------------------    Today's PHQ-2 Score:   PHQ-2 ( 1999 Pfizer) 6/8/2021 2/9/2021   Q1: Little interest or pleasure in doing things 0 0   Q2: Feeling down, depressed or hopeless 0 0   PHQ-2 Score 0 0       Abuse: Current or Past(Physical, Sexual or Emotional)- No  Do you feel safe in your environment? Yes    Have you ever done Advance Care Planning? (For example, a Health Directive, POLST, or a discussion with a medical provider or your loved ones about your wishes): No, advance care  planning information given to patient to review.  Patient declined advance care planning discussion at this time.    Social History     Tobacco Use     Smoking status: Never Smoker     Smokeless tobacco: Never Used   Substance Use Topics     Alcohol use: Yes     Alcohol/week: 0.0 standard drinks     Comment: Socially     If you drink alcohol do you typically have >3 drinks per day or >7 drinks per week? No                     Reviewed orders with patient.  Reviewed health maintenance and updated orders accordingly - Yes  Labs reviewed in EPIC    FSH-7: No flowsheet data found.    Mammogram Screening - Offered annual screening and updated Health Maintenance for mutual plan based on risk factor consideration    Pertinent mammograms are reviewed under the imaging tab.    Pertinent mammograms are reviewed under the imaging tab.  History of abnormal Pap smear: NO - age 30-65 PAP every 5 years with negative HPV co-testing recommended  PAP / HPV Latest Ref Rng & Units 6/27/2016 8/14/2013 8/10/2010   PAP - NIL NIL NIL   HPV 16 DNA NEG Negative - -   HPV 18 DNA NEG Negative - -   OTHER HR HPV NEG Negative - -   HPVSUR RESULT - - Negative  Reference range: Negative  (Note)  INTERPRETIVE INFORMATION: HPV High Risk, Hybrid Capture,  SurePath    The performance characteristics of HPV testing using the  SurePath sample medium were determined by [employer]  in a validation study. The FDA has not approved the  SurePath sample medium for HPV testing. Specimens collected  in SurePath sample medium may produce false-negative  results under certain conditions, e.g., when specimens  exceed stability requirements. For HPV results using an  FDA-approved test, laboratories should collect and  transport specimens according to the instructions of  FDA-approved kits (e.g., ThinPrep medium or HPV Digene  collection kit).    This test detects the high-risk HPV genotypes 16, 18, 31,  33, 35, 39, 45, 51, 52, 56, 58, 59, and 68 associated  "with  cervical cancer and its precursor lesions. However,  cross-reactions with other genotypes may occur. Results  should be correlated with cytologic and histologic  findings. Sensitivity may be affected by cellularity of  specimen.    This test is intended for medical purposes only and is not  valid for the evaluation of suspected sexual abuse or for  other forensic purposes.    HPV testing should not be used for screening or management  of atypical squamous cells of undetermined significance  (ASCUS) in women under age 21.    Test developed and characteristics determined by [employer]. See Compliance Statement B: [employer]  Performed by [employer] [facility],  [address] [place] [address] [phone]  [email or web address], Behzad Orona MD, Lab. Director -     Reviewed and updated as needed this visit by clinical staff  Tobacco  Allergies               Reviewed and updated as needed this visit by Provider                Past Medical History:   Diagnosis Date     Encounter for insertion of mirena IUD 06/27/2016     Menarche age    cycles q     X    d        ROS:  Other than what is noted in the HPI and PMH a complete review of systems is otherwise negative including: Constitutional, HEENT, endocrine, cardiovascular, respiratory, GI/, musculoskeletal, neuro, and psychiatric.     OBJECTIVE:   /84   Pulse 85   Temp 98  F (36.7  C) (Tympanic)   Resp 16   Ht 1.589 m (5' 2.56\")   Wt 54.2 kg (119 lb 6.4 oz)   BMI 21.45 kg/m    EXAM:  GENERAL: healthy, alert and no distress  EYES: Eyes grossly normal to inspection, PERRL and conjunctivae and sclerae normal  HENT: ear canals and TM's normal, nose and mouth without ulcers or lesions  NECK: no adenopathy, no asymmetry, masses, or scars and thyroid normal to palpation  RESP: lungs clear to auscultation - no rales, rhonchi or wheezes  BREAST: normal without masses, tenderness or nipple discharge and no palpable axillary masses or adenopathy  CV: " "regular rates and rhythm, normal S1 S2, no S3 or S4, no murmur, click or rub and spider veins of thighs  ABDOMEN: soft, nontender, no hepatosplenomegaly, no masses and bowel sounds normal   (female): normal female external genitalia, normal urethral meatus, vaginal mucosa pink, moist, well rugated, and normal cervix/adnexa/uterus without masses or discharge  MS: no gross musculoskeletal defects noted, no edema  SKIN: no suspicious lesions or rashes  NEURO: Normal strength and tone, mentation intact and speech normal  PSYCH: mentation appears normal, affect normal/bright      ASSESSMENT/PLAN:       ICD-10-CM    1. Encounter for routine adult medical exam with abnormal findings  Z00.01 Pap imaged thin layer screen with HPV - recommended age 30 - 65 years (select HPV order below)     HPV High Risk Types DNA Cervical   2. Encounter for removal and reinsertion of intrauterine contraceptive device (IUD)  Z30.433 OB/GYN REFERRAL   3. Thumb pain, right  M79.644 [facility] REFERRAL   4. Chest pain, unspecified type  R07.9 Echocardiogram Exercise Stress       1) Screenings discussed    2) Referral to GYN for IUD removal/reinsertion.    3) Referral to physical therapy per patient's request.    4) Will obtain a stress echo for further evaluation.     No further work up for itching or small varicosities at this time.       Patient has been advised of split billing requirements and indicates understanding: Yes  COUNSELING:   Reviewed preventive health counseling, as reflected in patient instructions    Estimated body mass index is 21.45 kg/m  as calculated from the following:    Height as of this encounter: 1.589 m (5' 2.56\").    Weight as of this encounter: 54.2 kg (119 lb 6.4 oz).    She reports that she has never smoked. She has never used smokeless tobacco.      Counseling Resources:  ATP IV Guidelines  Pooled Cohorts Equation Calculator  Breast Cancer Risk Calculator  BRCA-Related Cancer Risk Assessment: FHS-7 " Tool  FRAX Risk Assessment  ICSI Preventive Guidelines  Dietary Guidelines for Americans, 2010  USDA's MyPlate  ASA Prophylaxis  Lung CA Screening    JUANY Gatica [facility] DANDY

## 2021-06-10 LAB
COPATH REPORT: NORMAL
PAP: NORMAL

## 2021-06-14 LAB
FINAL DIAGNOSIS: NORMAL
HPV HR 12 DNA CVX QL NAA+PROBE: NEGATIVE
HPV16 DNA SPEC QL NAA+PROBE: NEGATIVE
HPV18 DNA SPEC QL NAA+PROBE: NEGATIVE
SPECIMEN DESCRIPTION: NORMAL
SPECIMEN SOURCE CVX/VAG CYTO: NORMAL

## 2021-07-08 PROBLEM — M54.41 BILATERAL LOW BACK PAIN WITH RIGHT-SIDED SCIATICA: Status: RESOLVED | Noted: 2020-07-24 | Resolved: 2021-07-08

## 2021-09-10 ENCOUNTER — MYC MEDICAL ADVICE (OUTPATIENT)
Dept: FAMILY MEDICINE | Facility: CLINIC | Age: 41
End: 2021-09-10

## 2021-09-10 NOTE — TELEPHONE ENCOUNTER
Routed to PCP and TCs.      Tash RN BSN  Triage Nurse  [facility]: [facility]

## 2021-10-23 ENCOUNTER — HEALTH MAINTENANCE LETTER (OUTPATIENT)
Age: 41
End: 2021-10-23

## 2022-06-04 ENCOUNTER — HEALTH MAINTENANCE LETTER (OUTPATIENT)
Age: 42
End: 2022-06-04

## 2022-07-30 ENCOUNTER — HEALTH MAINTENANCE LETTER (OUTPATIENT)
Age: 42
End: 2022-07-30

## 2022-08-21 NOTE — Clinical Note
Dear Mr. Pradhan The patient was seen in Allergy Clinic for a new patient visit.  Please see the office visit note for more details. Thank you!!!   Up times two sit to stand machine with GB. Pt is unsteady.   Incontinent, briefed.

## 2022-10-10 ENCOUNTER — HEALTH MAINTENANCE LETTER (OUTPATIENT)
Age: 42
End: 2022-10-10

## 2022-10-13 ENCOUNTER — MYC MEDICAL ADVICE (OUTPATIENT)
Dept: FAMILY MEDICINE | Facility: CLINIC | Age: 42
End: 2022-10-13

## 2022-10-13 DIAGNOSIS — L40.9 PSORIASIS: ICD-10-CM

## 2022-10-17 RX ORDER — TRIAMCINOLONE ACETONIDE 0.25 MG/G
OINTMENT TOPICAL 2 TIMES DAILY
Qty: 30 G | Refills: 0 | Status: SHIPPED | OUTPATIENT
Start: 2022-10-17

## 2023-02-02 ENCOUNTER — ANCILLARY PROCEDURE (OUTPATIENT)
Dept: MAMMOGRAPHY | Facility: CLINIC | Age: 43
End: 2023-02-02
Attending: PHYSICIAN ASSISTANT
Payer: COMMERCIAL

## 2023-02-02 DIAGNOSIS — Z12.31 VISIT FOR SCREENING MAMMOGRAM: ICD-10-CM

## 2023-02-02 PROCEDURE — 77067 SCR MAMMO BI INCL CAD: CPT | Mod: TC | Performed by: RADIOLOGY

## 2023-02-28 ENCOUNTER — ANCILLARY PROCEDURE (OUTPATIENT)
Dept: GENERAL RADIOLOGY | Facility: CLINIC | Age: 43
End: 2023-02-28
Attending: PHYSICIAN ASSISTANT
Payer: COMMERCIAL

## 2023-02-28 ENCOUNTER — OFFICE VISIT (OUTPATIENT)
Dept: FAMILY MEDICINE | Facility: CLINIC | Age: 43
End: 2023-02-28
Payer: COMMERCIAL

## 2023-02-28 VITALS
OXYGEN SATURATION: 98 % | WEIGHT: 144 LBS | DIASTOLIC BLOOD PRESSURE: 82 MMHG | HEART RATE: 76 BPM | TEMPERATURE: 98.2 F | BODY MASS INDEX: 24.59 KG/M2 | RESPIRATION RATE: 16 BRPM | HEIGHT: 64 IN | SYSTOLIC BLOOD PRESSURE: 124 MMHG

## 2023-02-28 DIAGNOSIS — R07.9 CHEST PAIN, UNSPECIFIED TYPE: ICD-10-CM

## 2023-02-28 DIAGNOSIS — R07.9 CHEST PAIN, UNSPECIFIED TYPE: Primary | ICD-10-CM

## 2023-02-28 PROCEDURE — 93000 ELECTROCARDIOGRAM COMPLETE: CPT | Performed by: PHYSICIAN ASSISTANT

## 2023-02-28 PROCEDURE — 71046 X-RAY EXAM CHEST 2 VIEWS: CPT | Mod: TC | Performed by: RADIOLOGY

## 2023-02-28 PROCEDURE — 99214 OFFICE O/P EST MOD 30 MIN: CPT | Performed by: PHYSICIAN ASSISTANT

## 2023-02-28 ASSESSMENT — PAIN SCALES - GENERAL: PAINLEVEL: NO PAIN (0)

## 2023-02-28 ASSESSMENT — ANXIETY QUESTIONNAIRES
GAD7 TOTAL SCORE: 0
6. BECOMING EASILY ANNOYED OR IRRITABLE: NOT AT ALL
IF YOU CHECKED OFF ANY PROBLEMS ON THIS QUESTIONNAIRE, HOW DIFFICULT HAVE THESE PROBLEMS MADE IT FOR YOU TO DO YOUR WORK, TAKE CARE OF THINGS AT HOME, OR GET ALONG WITH OTHER PEOPLE: NOT DIFFICULT AT ALL
4. TROUBLE RELAXING: NOT AT ALL
7. FEELING AFRAID AS IF SOMETHING AWFUL MIGHT HAPPEN: NOT AT ALL
GAD7 TOTAL SCORE: 0
8. IF YOU CHECKED OFF ANY PROBLEMS, HOW DIFFICULT HAVE THESE MADE IT FOR YOU TO DO YOUR WORK, TAKE CARE OF THINGS AT HOME, OR GET ALONG WITH OTHER PEOPLE?: NOT DIFFICULT AT ALL
5. BEING SO RESTLESS THAT IT IS HARD TO SIT STILL: NOT AT ALL
2. NOT BEING ABLE TO STOP OR CONTROL WORRYING: NOT AT ALL
1. FEELING NERVOUS, ANXIOUS, OR ON EDGE: NOT AT ALL
7. FEELING AFRAID AS IF SOMETHING AWFUL MIGHT HAPPEN: NOT AT ALL
3. WORRYING TOO MUCH ABOUT DIFFERENT THINGS: NOT AT ALL

## 2023-02-28 NOTE — NURSING NOTE
"Chief Complaint   Patient presents with     Breast Pain     Left      Chest Pain       Initial /82   Pulse 76   Temp 98.2  F (36.8  C) (Tympanic)   Resp 16   Ht 1.619 m (5' 3.75\")   Wt 65.3 kg (144 lb)   SpO2 98%   BMI 24.91 kg/m   Estimated body mass index is 24.91 kg/m  as calculated from the following:    Height as of this encounter: 1.619 m (5' 3.75\").    Weight as of this encounter: 65.3 kg (144 lb).  Medication Reconciliation: complete    KULDEEP Regalado MA    "

## 2023-02-28 NOTE — PROGRESS NOTES
"  Assessment & Plan     Chest pain, unspecified type  Mammogram normal.   CXR and EKG also reviewed and no concerns.   Discussed anxiety as a contributing factor.   Musculoskeletal chest wall pain also may be contributing since she has recently started more lifting in her exercise.   No prescriptions needed.   - XR Chest 2 Views; Future  - EKG 12-lead complete w/read - Clinics                 Return in about 1 month (around 3/28/2023) for as needed if symptoms worsen or persist, with primary provider.    Kristen M. Kehr, PA-C [facility]    Chastity Healy is a 42 year old, presenting for the following health issues:  Breast Pain (Left ) and Chest Pain      HPI     Left breast and chest pain for many weeks.   She had a mammogram which was negative / normal.   Pain is on the left. Some increase in discomfort when she is hungry. She is exercising and lifting more. Does not recall an injury. Discomfort is not worse with exertion.           Review of Systems   Constitutional, HEENT, cardiovascular, pulmonary, GI, , musculoskeletal, neuro, skin, endocrine and psych systems are negative, except as otherwise noted.      Objective    /82   Pulse 76   Temp 98.2  F (36.8  C) (Tympanic)   Resp 16   Ht 1.619 m (5' 3.75\")   Wt 65.3 kg (144 lb)   SpO2 98%   BMI 24.91 kg/m    Body mass index is 24.91 kg/m .  Physical Exam   GENERAL: healthy, alert and no distress  RESP: lungs clear to auscultation - no rales, rhonchi or wheezes  BREAST: normal without masses, tenderness or nipple discharge and no palpable axillary masses or adenopathy  CV: regular rate and rhythm, normal S1 S2, no S3 or S4, no murmur, click or rub, no peripheral edema and peripheral pulses strong  ABDOMEN: soft, nontender, no hepatosplenomegaly, no masses and bowel sounds normal  MS: no gross musculoskeletal defects noted, no edema  SKIN: no suspicious lesions or rashes  NEURO: Normal strength and tone, mentation intact " and speech normal  PSYCH: mentation appears normal, affect normal/bright    EKG: sinus rhythm, nonspecific changes.   CXR: normal.

## 2023-08-19 ENCOUNTER — HEALTH MAINTENANCE LETTER (OUTPATIENT)
Age: 43
End: 2023-08-19

## 2023-12-27 ENCOUNTER — MYC MEDICAL ADVICE (OUTPATIENT)
Dept: OBGYN | Facility: CLINIC | Age: 43
End: 2023-12-27
Payer: COMMERCIAL

## 2024-05-25 ENCOUNTER — HEALTH MAINTENANCE LETTER (OUTPATIENT)
Age: 44
End: 2024-05-25

## 2024-08-15 ENCOUNTER — ANCILLARY PROCEDURE (OUTPATIENT)
Dept: MAMMOGRAPHY | Facility: CLINIC | Age: 44
End: 2024-08-15
Attending: PHYSICIAN ASSISTANT
Payer: COMMERCIAL

## 2024-08-15 DIAGNOSIS — Z12.31 VISIT FOR SCREENING MAMMOGRAM: ICD-10-CM

## 2024-08-15 PROCEDURE — 77067 SCR MAMMO BI INCL CAD: CPT | Mod: TC | Performed by: RADIOLOGY

## 2024-08-15 PROCEDURE — 77063 BREAST TOMOSYNTHESIS BI: CPT | Mod: TC | Performed by: RADIOLOGY

## 2024-08-19 PROBLEM — R63.39 FOOD AVERSION: Status: RESOLVED | Noted: 2020-11-27 | Resolved: 2024-08-19

## 2024-08-19 NOTE — PROGRESS NOTES
SUBJECTIVE:    Is a pregnancy test required: No.  Was a consent obtained?  Yes    Subjective: Monet You is a 44 year old  presents for IUD and desires Mirena type IUD.  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   Last pap 2021, due 2026  Monet reports weekly urinary incontinence, she leaks if her bladder is full and then has stress or urge incontinence, interested in a referral to postpartum pelvic floor PT    Patient has been given the opportunity to ask questions about all forms of birth control, including all options appropriate for Monet You. Discussed that no method of birth control, except abstinence is 100% effective against pregnancy or sexually transmitted infection.     Monet You understands she may have the IUD removed at any time. IUD should be removed by a health care provider and the current IUD will be removed today.    The entire removal and insertion procedure was reviewed with the patient, including care after placement.    Today's PHQ-2 Score:       2023     9:41 AM   PHQ-2 (  Pfizer)   Q1: Little interest or pleasure in doing things 0   Q2: Feeling down, depressed or hopeless 0   PHQ-2 Score 0   Q1: Little interest or pleasure in doing things Not at all   Q2: Feeling down, depressed or hopeless Not at all   PHQ-2 Score 0       PROCEDURE:      A speculum exam was performed and the cervix was visualized. The IUD string was visualized. Using ring forceps, the string  was grasped and the IUD removed intact.    Under sterile technique, cervix was visualized with speculum and prepped with Betadine solution swab x 3. Tenaculum was placed for stability. The uterus was gently straightened and sounded to 9.0 cm. IUD prepared for  placement, and IUD inserted according to 's instructions without difficulty or significant ressitance, and deployed at the fundus. The strings were visualized and trimmed to 2.0 cm from the external os. Tenaculum was removed and hemostasis noted. Speculum removed.  Patient tolerated procedure well.    EBL: minimal    Complications: none      POST PROCEDURE:    Given 's handouts, including when to have IUD removed, list of danger s/sx, side effects and follow up recommended. Encouraged condom use for prevention of STD. Advised to call for any fever, for prolonged or severe pain or bleeding, abnormal vaginal dischage, or unable to palpate strings. She was advised to use pain medications (ibuprofen) as needed for mild to moderate pain. Advised to follow-up in clinic in 4-6 weeks for IUD string check if unable to find strings or as directed by provider.     Given Mirena, Kyleena and Nexplanon information to share with her two teenage daughters.  Referred to pelvic floor PT for incontinence  Xochitl Bustamante CNM

## 2024-08-22 ENCOUNTER — OFFICE VISIT (OUTPATIENT)
Dept: OBGYN | Facility: CLINIC | Age: 44
End: 2024-08-22
Attending: ADVANCED PRACTICE MIDWIFE
Payer: COMMERCIAL

## 2024-08-22 VITALS
SYSTOLIC BLOOD PRESSURE: 130 MMHG | HEART RATE: 83 BPM | DIASTOLIC BLOOD PRESSURE: 88 MMHG | HEIGHT: 64 IN | BODY MASS INDEX: 24.91 KG/M2

## 2024-08-22 DIAGNOSIS — Z30.433 ENCOUNTER FOR REMOVAL AND REINSERTION OF INTRAUTERINE CONTRACEPTIVE DEVICE: Primary | ICD-10-CM

## 2024-08-22 DIAGNOSIS — N39.46 MIXED STRESS AND URGE URINARY INCONTINENCE: ICD-10-CM

## 2024-08-22 PROCEDURE — 99202 OFFICE O/P NEW SF 15 MIN: CPT | Mod: 25 | Performed by: ADVANCED PRACTICE MIDWIFE

## 2024-08-22 PROCEDURE — 250N000011 HC RX IP 250 OP 636: Performed by: ADVANCED PRACTICE MIDWIFE

## 2024-08-22 PROCEDURE — 58300 INSERT INTRAUTERINE DEVICE: CPT | Performed by: ADVANCED PRACTICE MIDWIFE

## 2024-08-22 PROCEDURE — 58301 REMOVE INTRAUTERINE DEVICE: CPT | Performed by: ADVANCED PRACTICE MIDWIFE

## 2024-08-22 RX ADMIN — LEVONORGESTREL 1 EACH: 52 INTRAUTERINE DEVICE INTRAUTERINE at 12:45

## 2024-08-22 NOTE — PATIENT INSTRUCTIONS
Thank you for trusting us with your care!   Please be aware, if you are on Mychart, you may see your results prior to your providers review. If labs are abnormal, we will call or message you on [employer] with a follow up plan.    If you need to contact us for questions about:  Symptoms, Scheduling & Medical Questions; Non-urgent (2-3 day response) VIP Parking message, Urgent (needing response today) [phone] (if after 3:30pm next day response)   Prescriptions: Please call your Pharmacy   Billing: [facility] [phone] or [facility]:[phone]

## 2024-08-22 NOTE — LETTER
2024       RE: Monet You  [id number] [place] [address]     Dear Colleague,    Thank you for referring your patient, Monet You, to the [facility] [place] at [facility]. Please see a copy of my visit note below.    SUBJECTIVE:    Is a pregnancy test required: No.  Was a consent obtained?  Yes    Subjective: Monet You is a 44 year old  presents for IUD and desires Mirena type IUD.  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   Last pap 2021, due 2026  Monet reports weekly urinary incontinence, she leaks if her bladder is full and then has stress or urge incontinence, interested in a referral to postpartum pelvic floor PT    Patient has been given the opportunity to ask questions about all forms of birth control, including all options appropriate for Monet You. Discussed that no method of birth control, except abstinence is 100% effective against pregnancy or sexually transmitted infection.     Monet You understands she may have the IUD removed at any time. IUD should be removed by a health care provider and the current IUD will be removed today.    The entire removal and insertion procedure was reviewed with the patient, including care after placement.    Today's PHQ-2 Score:       2023     9:41 AM   PHQ-2 (  [employer])   Q1: Little interest or pleasure in doing things 0   Q2: Feeling down, depressed or hopeless 0   PHQ-2 Score 0   Q1: Little interest or pleasure in doing things Not at all   Q2: Feeling down, depressed or hopeless Not at all   PHQ-2 Score 0       PROCEDURE:      A speculum exam was performed and the cervix was visualized.  The IUD string was visualized. Using ring forceps, the string  was grasped and the IUD removed intact.    Under sterile technique, cervix was visualized with speculum and prepped with Betadine solution swab x 3. Tenaculum was placed for stability. The uterus was gently straightened and sounded to 9.0 cm. IUD prepared for placement, and IUD inserted according to 's instructions without difficulty or significant ressitance, and deployed at the fundus. The strings were visualized and trimmed to 2.0 cm from the external os. Tenaculum was removed and hemostasis noted. Speculum removed.  Patient tolerated procedure well.    EBL: minimal    Complications: none      POST PROCEDURE:    Given 's handouts, including when to have IUD removed, list of danger s/sx, side effects and follow up recommended. Encouraged condom use for prevention of STD. Advised to call for any fever, for prolonged or severe pain or bleeding, abnormal vaginal dischage, or unable to palpate strings. She was advised to use pain medications (ibuprofen) as needed for mild to moderate pain. Advised to follow-up in clinic in 4-6 weeks for IUD string check if unable to find strings or as directed by provider.     Given Mirena, Kyleena and Nexplanon information to share with her two teenage daughters.  Referred to pelvic floor PT for incontinence  Xochitl Bustamante CNM      Again, thank you for allowing me to participate in the care of your patient.      Sincerely,    Xochitl Bustamante CNM

## 2024-09-06 ENCOUNTER — OFFICE VISIT (OUTPATIENT)
Dept: OBGYN | Facility: CLINIC | Age: 44
End: 2024-09-06
Attending: MIDWIFE
Payer: COMMERCIAL

## 2024-09-06 VITALS
HEIGHT: 63 IN | WEIGHT: 150.9 LBS | HEART RATE: 71 BPM | SYSTOLIC BLOOD PRESSURE: 125 MMHG | DIASTOLIC BLOOD PRESSURE: 85 MMHG | BODY MASS INDEX: 26.74 KG/M2

## 2024-09-06 DIAGNOSIS — Z12.11 COLON CANCER SCREENING: ICD-10-CM

## 2024-09-06 DIAGNOSIS — Z00.00 ENCOUNTER FOR PREVENTIVE HEALTH EXAMINATION: Primary | ICD-10-CM

## 2024-09-06 DIAGNOSIS — D22.9 ATYPICAL NEVI: ICD-10-CM

## 2024-09-06 PROCEDURE — 99212 OFFICE O/P EST SF 10 MIN: CPT | Performed by: ADVANCED PRACTICE MIDWIFE

## 2024-09-06 PROCEDURE — 99396 PREV VISIT EST AGE 40-64: CPT | Performed by: ADVANCED PRACTICE MIDWIFE

## 2024-09-06 ASSESSMENT — ANXIETY QUESTIONNAIRES
GAD7 TOTAL SCORE: 0
7. FEELING AFRAID AS IF SOMETHING AWFUL MIGHT HAPPEN: NOT AT ALL
GAD7 TOTAL SCORE: 0
8. IF YOU CHECKED OFF ANY PROBLEMS, HOW DIFFICULT HAVE THESE MADE IT FOR YOU TO DO YOUR WORK, TAKE CARE OF THINGS AT HOME, OR GET ALONG WITH OTHER PEOPLE?: NOT DIFFICULT AT ALL
GAD7 TOTAL SCORE: 0

## 2024-09-06 NOTE — PROGRESS NOTES
Progress Note    SUBJECTIVE:  Monet You is an 44 year old, , who requests an Annual Preventive Exam.   She is a former patient to the [facility].   LMP bled for a few days after placement, Had some spotting this week. Typically doesn't bleed with the Mirena and is hoping she won't have much bleeding going forward.    Monet has occasional hot flashes and night sweats and mood irritability. She has noticed this is improved in the last month    Currently sexually active with one male partner, Juan M. They are monogamous, declines STD screening today  Currently using Mirena for birth control. Recently had her Mirena replaced 24.     Monet reports weekly urinary incontinence, she leaks if her bladder is full and then has stress or urge incontinence, referred to pelvic floor PT     The patient reports that there is not domestic violence in her life.      Denies abnormal vaginal discharge, itching, irritation, odor, dyspareunia, or dysuria.    Exercise: yoga, walking  Stress reduction: yoga, sleeping, bible studies, walking, connecting with loved ones. 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   Monet feels like she is is crisis mode and is holding it together but feels like she will breakdown. She has seen a therapist through [facility] whose name is Sudha. She would like to see her again if she is available.       Last pap 2021, due 2026   Last HgbA1C ?  CMP: 3/2020 glucose 110  Last lipids: () recommended q 3-5 years  Cholesterol   Date Value Ref Range Status   07/15/2020 166 <200 mg/dL Final  "  05/26/2016 153 <200 mg/dL Final     HDL Cholesterol   Date Value Ref Range Status   07/15/2020 60 >49 mg/dL Final   05/26/2016 45 (L) >49 mg/dL Final     LDL Cholesterol Calculated   Date Value Ref Range Status   07/15/2020 94 <100 mg/dL Final     Comment:     Desirable:       <100 mg/dl   05/26/2016 94 <100 mg/dL Final     Comment:     Desirable:       <100 mg/dl     Triglycerides   Date Value Ref Range Status   07/15/2020 62 <150 mg/dL Final     Comment:     Fasting specimen   05/26/2016 72 <150 mg/dL Final     Comment:     Fasting specimen     No results found for: \"CHOLHDLRATIO\"    Menstrual History:      7/2/2007    10:30 AM 11/18/2009     3:15 PM 12/1/2011     2:45 PM   Menstrual History   LAST MENSTRUAL PERIOD 6/18/2007 10/31/2009 11/9/2011       Last    Lab Results   Component Value Date    PAP NIL 06/08/2021     History of abnormal Pap smear: No - age 30-64 HPV with reflex Pap every 5 years recommended    Last   Lab Results   Component Value Date    HPV16 Negative 06/08/2021     Last   Lab Results   Component Value Date    HPV18 Negative 06/08/2021     Last   Lab Results   Component Value Date    HRHPV Negative 06/08/2021       Mammogram current: yes and not applicable  Last Mammogram:   MA Screening Digital Bilateral    Result Date: 2/2/2023  Narrative: BILATERAL FULL FIELD DIGITAL SCREENING MAMMOGRAM Performed on: 2/2/23 Compared to: 02/25/2021, 12/16/2019, 01/30/2014, and 01/30/2014 Technique: This study was evaluated with the assistance of Computer-Aided Detection. Findings: The breasts have scattered areas of fibroglandular density.  There is no radiographic evidence of malignancy.     MA Screening Digital Bilateral    Result Date: 2/25/2021  Narrative: SCREENING MAMMOGRAM, BILATERAL, DIGITAL w/CAD - 2/25/2021 11:16 AM BREAST SYMPTOMS: No current breast complaints. COMPARISON:  12/16/2019, 1/30/2014. BREAST DENSITY: Heterogeneously dense. COMMENTS: No findings of suspicion for malignancy.        Last " Colonoscopy:  No results found for this or any previous visit.      HISTORY:  Current Outpatient Medications   Medication Sig Dispense Refill    levonorgestrel (MIRENA) 52 MG (20 mcg/day) IUD 1 each by Intrauterine route once      Multiple Vitamin (MULTIVITAMIN OR) Take  by mouth daily.      triamcinolone (KENALOG) 0.025 % external ointment Apply topically 2 times daily x5-7 days (Patient not taking: Reported on 2024) 30 g 0     No current facility-administered medications for this visit.     Allergies   Allergen Reactions    Ceclor [Cefaclor] Hives    Parabens Itching    Reglan [Metoclopramide Hcl]      restless     Immunization History   Administered Date(s) Administered    COVID-19 MONOVALENT 12+ (Pfizer) 2021, 2021    Influenza (IIV3) PF 10/20/2011, 2013    Influenza Vaccine 18-64 (Flublok) 10/29/2021, 10/26/2022, 2023    Influenza Vaccine >6 months,quad, PF 10/25/2017, 2017, 2018, 2019    Influenza, seasonal, injectable, PF 10/20/2011, 2013    TDAP Vaccine (Boostrix) 2012       OB History    Para Term  AB Living   4 2 1 1 2 2   SAB IAB Ectopic Multiple Live Births   1 1 0 0 2     Past Medical History:   Diagnosis Date    Encounter for insertion of mirena IUD 2016    Menarche age    cycles q     X    d     Past Surgical History:   Procedure Laterality Date    D & C      miscarriage     Family History   Problem Relation Age of Onset    Eye Disorder Mother         glaucoma    Rheumatoid Arthritis Mother     Skin Cancer Mother 64    Rectal Cancer Mother 64    Hypertension Father     Diabetes Father         Type 2    Hypertension Maternal Grandmother     Lung Cancer Maternal Grandmother 65        [place] to breast; smoker    Kidney Disease Maternal Grandfather         Goodpaster's     Hypertension Paternal Grandmother     Thyroid Disease Paternal Grandmother     Brain Cancer Paternal Grandmother         Ronni to breast    Abdominal Aortic  "Aneurysm Paternal Grandfather     Hypertension Paternal Grandfather     Cerebrovascular Disease No family hx of     Cancer - colorectal No family hx of      Social History     Socioeconomic History    Marital status:    Tobacco Use    Smoking status: Never    Smokeless tobacco: Never   Vaping Use    Vaping status: Never Used   Substance and Sexual Activity    Alcohol use: Yes     Alcohol/week: 0.0 standard drinks of alcohol     Comment: Socially    Drug use: No    Sexual activity: Yes     Partners: Male     Birth control/protection: I.U.D.     Comment: Mirena   Other Topics Concern    Parent/sibling w/ CABG, MI or angioplasty before 65F 55M? No   Social History Narrative    March 16, 2017    ENVIRONMENTAL HISTORY: The family [place] a 13 year [place] The home is heated with a gas furnace. They do have central air conditioning. The patient's bedroom is furnished with feather/wool bedding or pillows, carpeting in bedroom and allergen mattress cover.  Pets inside the house include 1 dog(s). There is not history of cockroach or mice infestation. There are no smokers in the house.  The house does not have a damp basement.        ROS  [unfilled]      11/17/2020    11:00 AM 12/3/2020    10:00 AM 12/17/2020     9:00 AM   PHQ-9 SCORE   PHQ-9 Total Score 4 4 5         12/17/2020     9:00 AM 2/28/2023     9:41 AM 9/6/2024     4:04 PM   HILL-7 SCORE   Total Score  0 (minimal anxiety) 0 (minimal anxiety)   Total Score 4 0 0         EXAM:  Blood pressure 125/85, pulse 71, height 1.6 m (5' 3\"), weight 68.4 kg (150 lb 14.4 oz), not currently breastfeeding. Body mass index is 26.73 kg/m .  General - pleasant female in no acute distress.  Skin - approximately 1cm dark brown nevus with irregular borders on left calf, multiple freckles  EENT-  PERRLA, euthyroid with out palpable nodules  Neck - supple without lymphadenopathy.  Lungs - clear to auscultation bilaterally.  Heart - regular rate and rhythm " without murmur.  Abdomen - soft, nontender, nondistended, no masses or organomegaly noted.  Musculoskeletal - no gross deformities.  Neurological - normal strength, sensation, and mental status.    Breast Exam:  Breast: Without visible skin changes. No dimpling or lesions seen.   Breasts supple, non-tender with palpation, no dominant mass, nodularity, or nipple discharge noted bilaterally. Axillary nodes negative.      Pelvic Exam: deferred recently had an exam, asymptomatic      ASSESSMENT:  Encounter Diagnoses   Name Primary?    Encounter for preventive health examination Yes    Atypical nevi     Colon cancer screening         PLAN:   Orders Placed This Encounter   Procedures    Glucose    Vitamin D Deficiency    TSH with free T4 reflex    Lipid Profile    Hemoglobin A1c    [facility]  Referral    Colonoscopy Screening  Referral     No orders of the defined types were placed in this encounter.    -Colonoscopy orders placed  -Monet will see dermatology soon and have her nevus biopsied.   -will return for preventive health labs fasting next week  -Offered therapeutic listening regarding increased stress related to recent trauma. Referred to mental health therapy for ongoing support  Additional teaching done at this visit regarding calcium (1200 mg per day), self breast exam, exercise, perimenopause, mental health, and weight/diet.    Return to clinic in one year.  Follow-up as needed.      HIRA Paulson, GUIDO        Answers submitted by the patient for this visit:  Patient Health Questionnaire (G7) (Submitted on 9/6/2024)  HILL 7 TOTAL SCORE: 0

## 2024-09-06 NOTE — LETTER
2024       RE: Monet You  [id number] [place] [address]     Dear Colleague,    Thank you for referring your patient, Monet You, to the [facility] [place] at [facility]. Please see a copy of my visit note below.      Progress Note    SUBJECTIVE:  Monet You is an 44 year old, , who requests an Annual Preventive Exam.   She is a former patient to the [facility].   LMP bled for a few days after placement, Had some spotting this week. Typically doesn't bleed with the Mirena and is hoping she won't have much bleeding going forward.    Monet has occasional hot flashes and night sweats and mood irritability. She has noticed this is improved in the last month    Currently sexually active with one male partner, Juan M. They are monogamous, declines STD screening today  Currently using Mirena for birth control. Recently had her Mirena replaced 24.     Monet reports weekly urinary incontinence, she leaks if her bladder is full and then has stress or urge incontinence, referred to pelvic floor PT     The patient reports that there is not domestic violence in her life.      Denies abnormal vaginal discharge, itching, irritation, odor, dyspareunia, or dysuria.    Exercise: yoga, walking  Stress reduction: yoga, sleeping, bible studies, walking, connecting with loved ones. 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   Monet feels like she is is crisis mode and is holding it together but feels like she will  "breakdown. She has seen a therapist through [facility] whose name is Sudha. She would like to see her again if she is available.       Last pap 6/2021, due 6/2026   Last HgbA1C ?  CMP: 3/2020 glucose 110  Last lipids: (2020) recommended q 3-5 years  Cholesterol   Date Value Ref Range Status   07/15/2020 166 <200 mg/dL Final   05/26/2016 153 <200 mg/dL Final     HDL Cholesterol   Date Value Ref Range Status   07/15/2020 60 >49 mg/dL Final   05/26/2016 45 (L) >49 mg/dL Final     LDL Cholesterol Calculated   Date Value Ref Range Status   07/15/2020 94 <100 mg/dL Final     Comment:     Desirable:       <100 mg/dl   05/26/2016 94 <100 mg/dL Final     Comment:     Desirable:       <100 mg/dl     Triglycerides   Date Value Ref Range Status   07/15/2020 62 <150 mg/dL Final     Comment:     Fasting specimen   05/26/2016 72 <150 mg/dL Final     Comment:     Fasting specimen     No results found for: \"CHOLHDLRATIO\"    Menstrual History:      7/2/2007    10:30 AM 11/18/2009     3:15 PM 12/1/2011     2:45 PM   Menstrual History   LAST MENSTRUAL PERIOD 6/18/2007 10/31/2009 11/9/2011       Last    Lab Results   Component Value Date    PAP NIL 06/08/2021     History of abnormal Pap smear: No - age 30-64 HPV with reflex Pap every 5 years recommended    Last   Lab Results   Component Value Date    HPV16 Negative 06/08/2021     Last   Lab Results   Component Value Date    HPV18 Negative 06/08/2021     Last   Lab Results   Component Value Date    HRHPV Negative 06/08/2021       Mammogram current: yes and not applicable  Last Mammogram:   MA Screening Digital Bilateral    Result Date: 2/2/2023  Narrative: BILATERAL FULL FIELD DIGITAL SCREENING MAMMOGRAM Performed on: 2/2/23 Compared to: 02/25/2021, 12/16/2019, 01/30/2014, and 01/30/2014 Technique: This study was evaluated with the assistance of Computer-Aided Detection. Findings: The breasts have scattered areas of fibroglandular density.  There is no radiographic evidence of malignancy. "     MA Screening Digital Bilateral    Result Date: 2021  Narrative: SCREENING MAMMOGRAM, BILATERAL, DIGITAL w/CAD - 2021 11:16 AM BREAST SYMPTOMS: No current breast complaints. COMPARISON:  2019, 2014. BREAST DENSITY: Heterogeneously dense. COMMENTS: No findings of suspicion for malignancy.        Last Colonoscopy:  No results found for this or any previous visit.      HISTORY:  Current Outpatient Medications   Medication Sig Dispense Refill     levonorgestrel (MIRENA) 52 MG (20 mcg/day) IUD 1 each by Intrauterine route once       Multiple Vitamin (MULTIVITAMIN OR) Take  by mouth daily.       triamcinolone (KENALOG) 0.025 % external ointment Apply topically 2 times daily x5-7 days (Patient not taking: Reported on 2024) 30 g 0     No current facility-administered medications for this visit.     Allergies   Allergen Reactions     Ceclor [Cefaclor] Hives     Parabens Itching     Reglan [Metoclopramide Hcl]      restless     Immunization History   Administered Date(s) Administered     COVID-19 MONOVALENT 12+ (Pfizer) 2021, 2021     Influenza (IIV3) PF 10/20/2011, 2013     Influenza Vaccine 18-64 (Flublok) 10/29/2021, 10/26/2022, 2023     Influenza Vaccine >6 months,quad, PF 10/25/2017, 2017, 2018, 2019     Influenza, seasonal, injectable, PF 10/20/2011, 2013     TDAP Vaccine (Boostrix) 2012       OB History    Para Term  AB Living   4 2 1 1 2 2   SAB IAB Ectopic Multiple Live Births   1 1 0 0 2     Past Medical History:   Diagnosis Date     Encounter for insertion of mirena IUD 2016     Menarche age    cycles q     X    d     Past Surgical History:   Procedure Laterality Date     D & C      miscarriage     Family History   Problem Relation Age of Onset     Eye Disorder Mother         glaucoma     Rheumatoid Arthritis Mother      Skin Cancer Mother 64     Rectal Cancer Mother 64     Hypertension Father      Diabetes  "Father         Type 2     Hypertension Maternal Grandmother      Lung Cancer Maternal Grandmother 65        [place] to breast; smoker     Kidney Disease Maternal Grandfather         Goodpaster's      Hypertension Paternal Grandmother      Thyroid Disease Paternal Grandmother      Brain Cancer Paternal Grandmother         [place] to breast     Abdominal Aortic Aneurysm Paternal Grandfather      Hypertension Paternal Grandfather      Cerebrovascular Disease No family hx of      Cancer - colorectal No family hx of      Social History     Socioeconomic History     Marital status:    Tobacco Use     Smoking status: Never     Smokeless tobacco: Never   Vaping Use     Vaping status: Never Used   Substance and Sexual Activity     Alcohol use: Yes     Alcohol/week: 0.0 standard drinks of alcohol     Comment: Socially     Drug use: No     Sexual activity: Yes     Partners: Male     Birth control/protection: I.U.D.     Comment: Mirena   Other Topics Concern     Parent/sibling w/ CABG, MI or angioplasty before 65F 55M? No   Social History Narrative    March 16, 2017    ENVIRONMENTAL HISTORY: The family [place] in a 13 [address] [place]. The home is heated with a gas furnace. They do have central air conditioning. The patient's bedroom is furnished with feather/wool bedding or pillows, carpeting in bedroom and allergen mattress cover.  Pets inside the house include 1 dog(s). There is not history of cockroach or mice infestation. There are no smokers in the house.  The house does not have a damp basement.        ROS  [unfilled]      11/17/2020    11:00 AM 12/3/2020    10:00 AM 12/17/2020     9:00 AM   PHQ-9 SCORE   PHQ-9 Total Score 4 4 5         12/17/2020     9:00 AM 2/28/2023     9:41 AM 9/6/2024     4:04 PM   HILL-7 SCORE   Total Score  0 (minimal anxiety) 0 (minimal anxiety)   Total Score 4 0 0         EXAM:  Blood pressure 125/85, pulse 71, height 1.6 m (5' 3\"), weight 68.4 kg (150 lb 14.4 oz), not currently " breastfeeding. Body mass index is 26.73 kg/m .  General - pleasant female in no acute distress.  Skin - approximately 1cm dark brown nevus with irregular borders on left calf, multiple freckles  EENT-  PERRLA, euthyroid with out palpable nodules  Neck - supple without lymphadenopathy.  Lungs - clear to auscultation bilaterally.  Heart - regular rate and rhythm without murmur.  Abdomen - soft, nontender, nondistended, no masses or organomegaly noted.  Musculoskeletal - no gross deformities.  Neurological - normal strength, sensation, and mental status.    Breast Exam:  Breast: Without visible skin changes. No dimpling or lesions seen.   Breasts supple, non-tender with palpation, no dominant mass, nodularity, or nipple discharge noted bilaterally. Axillary nodes negative.      Pelvic Exam: deferred recently had an exam, asymptomatic      ASSESSMENT:  Encounter Diagnoses   Name Primary?     Encounter for preventive health examination Yes     Atypical nevi      Colon cancer screening         PLAN:   Orders Placed This Encounter   Procedures     Glucose     Vitamin D Deficiency     TSH with free T4 reflex     Lipid Profile     Hemoglobin A1c     Adult Mental Health  Referral     Colonoscopy Screening  Referral     No orders of the defined types were placed in this encounter.    -Colonoscopy orders placed  -Monet will see dermatology soon and have her nevus biopsied.   -will return for preventive health labs fasting next week  -Offered therapeutic listening regarding increased stress related to recent trauma. Referred to mental health therapy for ongoing support  Additional teaching done at this visit regarding calcium (1200 mg per day), self breast exam, exercise, perimenopause, mental health, and weight/diet.    Return to clinic in one year.  Follow-up as needed.      Xochitl Bustamante, HIRA, GUIDO        Answers submitted by the patient for this visit:  Patient Health Questionnaire (G7)  (Submitted on 9/6/2024)  HILL 7 TOTAL SCORE: 0      Again, thank you for allowing me to participate in the care of your patient.      Sincerely,    Xochitl Bustamante CNM

## 2024-10-02 ENCOUNTER — MYC MEDICAL ADVICE (OUTPATIENT)
Dept: OBGYN | Facility: CLINIC | Age: 44
End: 2024-10-02
Payer: COMMERCIAL

## 2024-10-17 ENCOUNTER — LAB (OUTPATIENT)
Dept: LAB | Facility: CLINIC | Age: 44
End: 2024-10-17
Payer: COMMERCIAL

## 2024-10-17 DIAGNOSIS — Z00.00 ENCOUNTER FOR PREVENTIVE HEALTH EXAMINATION: ICD-10-CM

## 2024-10-17 DIAGNOSIS — Z13.9 SCREENING FOR CONDITION: ICD-10-CM

## 2024-10-17 DIAGNOSIS — R53.83 FATIGUE, UNSPECIFIED TYPE: ICD-10-CM

## 2024-10-17 LAB
CHOLEST SERPL-MCNC: 193 MG/DL
EST. AVERAGE GLUCOSE BLD GHB EST-MCNC: 108 MG/DL
FASTING STATUS PATIENT QL REPORTED: YES
FASTING STATUS PATIENT QL REPORTED: YES
GLUCOSE SERPL-MCNC: 100 MG/DL (ref 70–99)
HBA1C MFR BLD: 5.4 %
HDLC SERPL-MCNC: 50 MG/DL
LDLC SERPL CALC-MCNC: 124 MG/DL
NONHDLC SERPL-MCNC: 143 MG/DL
T4 FREE SERPL-MCNC: 0.92 NG/DL (ref 0.9–1.7)
TRIGL SERPL-MCNC: 96 MG/DL
TSH SERPL DL<=0.005 MIU/L-ACNC: 4.56 UIU/ML (ref 0.3–4.2)
VIT D+METAB SERPL-MCNC: 23 NG/ML (ref 20–50)

## 2024-10-17 PROCEDURE — 84439 ASSAY OF FREE THYROXINE: CPT

## 2024-10-17 PROCEDURE — 82306 VITAMIN D 25 HYDROXY: CPT

## 2024-10-17 PROCEDURE — 36415 COLL VENOUS BLD VENIPUNCTURE: CPT

## 2024-10-17 PROCEDURE — 80053 COMPREHEN METABOLIC PANEL: CPT

## 2024-10-17 PROCEDURE — 84443 ASSAY THYROID STIM HORMONE: CPT

## 2024-10-17 PROCEDURE — 83735 ASSAY OF MAGNESIUM: CPT

## 2024-10-17 PROCEDURE — 82947 ASSAY GLUCOSE BLOOD QUANT: CPT

## 2024-10-17 PROCEDURE — 83036 HEMOGLOBIN GLYCOSYLATED A1C: CPT

## 2024-10-17 PROCEDURE — 80061 LIPID PANEL: CPT

## 2024-10-18 DIAGNOSIS — Z13.9 SCREENING FOR CONDITION: ICD-10-CM

## 2024-10-18 DIAGNOSIS — L98.9 SKIN LESION: Primary | ICD-10-CM

## 2024-10-18 DIAGNOSIS — R53.83 FATIGUE, UNSPECIFIED TYPE: Primary | ICD-10-CM

## 2024-10-18 LAB
ALBUMIN SERPL BCG-MCNC: 4.4 G/DL (ref 3.5–5.2)
ALP SERPL-CCNC: 77 U/L (ref 40–150)
ALT SERPL W P-5'-P-CCNC: 16 U/L (ref 0–50)
ANION GAP SERPL CALCULATED.3IONS-SCNC: 11 MMOL/L (ref 7–15)
AST SERPL W P-5'-P-CCNC: 19 U/L (ref 0–45)
BILIRUB SERPL-MCNC: 0.3 MG/DL
BUN SERPL-MCNC: 17.5 MG/DL (ref 6–20)
CALCIUM SERPL-MCNC: 8.8 MG/DL (ref 8.8–10.4)
CHLORIDE SERPL-SCNC: 106 MMOL/L (ref 98–107)
CREAT SERPL-MCNC: 0.94 MG/DL (ref 0.51–0.95)
EGFRCR SERPLBLD CKD-EPI 2021: 76 ML/MIN/1.73M2
FASTING STATUS PATIENT QL REPORTED: YES
GLUCOSE SERPL-MCNC: 92 MG/DL (ref 70–99)
HCO3 SERPL-SCNC: 22 MMOL/L (ref 22–29)
MAGNESIUM SERPL-MCNC: 2.3 MG/DL (ref 1.7–2.3)
POTASSIUM SERPL-SCNC: 4.8 MMOL/L (ref 3.4–5.3)
PROT SERPL-MCNC: 7.7 G/DL (ref 6.4–8.3)
SODIUM SERPL-SCNC: 139 MMOL/L (ref 135–145)

## 2024-11-06 ENCOUNTER — OFFICE VISIT (OUTPATIENT)
Dept: DERMATOLOGY | Facility: CLINIC | Age: 44
End: 2024-11-06
Payer: COMMERCIAL

## 2024-11-06 DIAGNOSIS — L82.1 SEBORRHEIC KERATOSES: ICD-10-CM

## 2024-11-06 DIAGNOSIS — D18.01 ANGIOMA OF SKIN: ICD-10-CM

## 2024-11-06 DIAGNOSIS — D23.9 DERMAL NEVUS: Primary | ICD-10-CM

## 2024-11-06 DIAGNOSIS — L81.4 LENTIGO: ICD-10-CM

## 2024-11-06 PROCEDURE — G2211 COMPLEX E/M VISIT ADD ON: HCPCS | Performed by: DERMATOLOGY

## 2024-11-06 PROCEDURE — 99203 OFFICE O/P NEW LOW 30 MIN: CPT | Performed by: DERMATOLOGY

## 2024-11-06 ASSESSMENT — PAIN SCALES - GENERAL: PAINLEVEL_OUTOF10: NO PAIN (0)

## 2024-11-06 NOTE — NURSING NOTE
Chief Complaint   Patient presents with    Skin Check     Spot on left calf       There were no vitals filed for this visit.  Wt Readings from Last 1 Encounters:   09/06/24 68.4 kg (150 lb 14.4 oz)       Elle Connell LPN .................11/6/2024

## 2024-11-06 NOTE — PROGRESS NOTES
Monet You is an extremely pleasant 44 year old year old female patient I was asked to see by A Page  for spots on skin.  Patient has no other skin complaints today.  Remainder of the HPI, Meds, PMH, Allergies, FH, and SH was reviewed in chart.      Past Medical History:   Diagnosis Date    Encounter for insertion of Mirena IUD 06/27/2016    Menarche age    cycles q     X    d       Past Surgical History:   Procedure Laterality Date    D & C  5/06    miscarriage        Family History   Problem Relation Age of Onset    Eye Disorder Mother         glaucoma    Rheumatoid Arthritis Mother     Skin Cancer Mother 64    Rectal Cancer Mother 64    Hypertension Father     Diabetes Father         Type 2    Hypertension Maternal Grandmother     Lung Cancer Maternal Grandmother 65        [place] to breast; smoker    Kidney Disease Maternal Grandfather         Goodpaster's     Hypertension Paternal Grandmother     Thyroid Disease Paternal Grandmother     Brain Cancer Paternal Grandmother         Ronni to breast    Abdominal Aortic Aneurysm Paternal Grandfather     Hypertension Paternal Grandfather     Cerebrovascular Disease No family hx of     Cancer - colorectal No family hx of        Social History     Socioeconomic History    Marital status:      Spouse name: Not on file    Number of children: Not on file    Years of education: Not on file    Highest education level: Not on file   Occupational History    Not on file   Tobacco Use    Smoking status: Never    Smokeless tobacco: Never   Vaping Use    Vaping status: Never Used   Substance and Sexual Activity    Alcohol use: Yes     Alcohol/week: 0.0 standard drinks of alcohol     Comment: Socially    Drug use: No    Sexual activity: Yes     Partners: Male     Birth control/protection: I.U.D.     Comment: Mirena   Other Topics Concern    Parent/sibling w/ CABG, MI or angioplasty before 65F 55M? No   Social History Narrative    March 16, 2017    ENVIRONMENTAL  HISTORY: The family [place] in a [place]. The home is heated with a gas furnace. They do have central air conditioning. The patient's bedroom is furnished with feather/wool bedding or pillows, carpeting in bedroom and allergen mattress cover.  Pets inside the house include 1 dog(s). There is not history of cockroach or mice infestation. There are no smokers in the house.  The house does not have a damp basement.      Social Drivers of Health     Financial Resource Strain: Not on file   Food Insecurity: Not on file   Transportation Needs: Not on file   Physical Activity: Not on file   Stress: Not on file   Social Connections: Not on file   Interpersonal Safety: Not on file   Housing Stability: Not on file       Outpatient Encounter Medications as of 11/6/2024   Medication Sig Dispense Refill    levonorgestrel (MIRENA) 52 MG (20 mcg/day) IUD 1 each by Intrauterine route once      Multiple Vitamin (MULTIVITAMIN OR) Take  by mouth daily.      triamcinolone (KENALOG) 0.025 % external ointment Apply topically 2 times daily x5-7 days (Patient not taking: Reported on 8/22/2024) 30 g 0     No facility-administered encounter medications on file as of 11/6/2024.             O:   NAD, WDWN, Alert & Oriented, Mood & Affect wnl, Vitals stable   General appearance normal   Vitals stable   Alert, oriented and in no acute distress     L calf stuck on plaque      Stuck on papules and brown macules on trunk and ext   Red papules on trunk  Flesh colored papules on trunk     The remainder of the full exam was normal; the following areas were examined:  conjunctiva/lids, , neck, peripheral vascular system, abdomen, lymph nodes, digits/nails, eccrine and apocrine glands, scalp/hair, face, neck, chest, abdomen, buttocks, back, RUE, LUE, RLE, LLE       Eyes: Conjunctivae/lids:Normal     ENT: Lips, mucosa: normal    MSK:Normal    Cardiovascular: peripheral edema none    Pulm: Breathing Normal    Lymph Nodes: No  Head and Neck Lymphadenopathy     Neuro/Psych: Orientation:Alert and Orientedx3 ; Mood/Affect:normal       A/P:  1. Seborrheic keratosis, lentigo, angioma, dermal nevus  Cryo discussed with patient    2. Angioma  Cosmetic destruction discussed with patient   It was a pleasure speaking to Monet You today.  Previous clinic notes and pertinent laboratory tests were reviewed prior to Monet You's visit.  Nature and genetics of benign skin lesions dicussed with patient.  Signs and Symptoms of skin cancer discussed with patient.  Patient encouraged to perform monthly skin exams.  UV precautions reviewed with patient.  Return to clinic 12 months

## 2024-11-06 NOTE — LETTER
11/6/2024      Monet You  [id number] [place] [address]      Dear Colleague,    Thank you for referring your patient, Monet You, to the [facility]. Please see a copy of my visit note below.    Monet You is an extremely pleasant 44 year old year old female patient I was asked to see by A Page  for spots on skin.  Patient has no other skin complaints today.  Remainder of the HPI, Meds, PMH, Allergies, FH, and SH was reviewed in chart.      Past Medical History:   Diagnosis Date     Encounter for insertion of Mirena IUD 06/27/2016     Menarche age    cycles q     X    d       Past Surgical History:   Procedure Laterality Date     D & C  5/06    miscarriage        Family History   Problem Relation Age of Onset     Eye Disorder Mother         glaucoma     Rheumatoid Arthritis Mother      Skin Cancer Mother 64     Rectal Cancer Mother 64     Hypertension Father      Diabetes Father         Type 2     Hypertension Maternal Grandmother      Lung Cancer Maternal Grandmother 65        Ronni to breast; smoker     Kidney Disease Maternal Grandfather         Goodpaster's      Hypertension Paternal Grandmother      Thyroid Disease Paternal Grandmother      Brain Cancer Paternal Grandmother         Ronni to breast     Abdominal Aortic Aneurysm Paternal Grandfather      Hypertension Paternal Grandfather      Cerebrovascular Disease No family hx of      Cancer - colorectal No family hx of        Social History     Socioeconomic History     Marital status:      Spouse name: Not on file     Number of children: Not on file     Years of education: Not on file     Highest education level: Not on file   Occupational History     Not on file   Tobacco Use     Smoking status: Never     Smokeless tobacco: Never   Vaping Use     Vaping status: Never Used   Substance and Sexual Activity     Alcohol use: Yes     Alcohol/week: 0.0 standard drinks of alcohol      Comment: Socially     Drug use: No     Sexual activity: Yes     Partners: Male     Birth control/protection: I.U.D.     Comment: Mirena   Other Topics Concern     Parent/sibling w/ CABG, MI or angioplasty before 65F 55M? No   Social History Narrative    March 16, 2017    ENVIRONMENTAL HISTORY: The family [place] in a [address] year [place] The home is heated with a gas furnace. They do have central air conditioning. The patient's bedroom is furnished with feather/wool bedding or pillows, carpeting in bedroom and allergen mattress cover.  Pets inside the house include 1 dog(s). There is not history of cockroach or mice infestation. There are no smokers in the house.  The house does not have a damp basement.      Social Drivers of Health     Financial Resource Strain: Not on file   Food Insecurity: Not on file   Transportation Needs: Not on file   Physical Activity: Not on file   Stress: Not on file   Social Connections: Not on file   Interpersonal Safety: Not on file   Housing Stability: Not on file       Outpatient Encounter Medications as of 11/6/2024   Medication Sig Dispense Refill     levonorgestrel (MIRENA) 52 MG (20 mcg/day) IUD 1 each by Intrauterine route once       Multiple Vitamin (MULTIVITAMIN OR) Take  by mouth daily.       triamcinolone (KENALOG) 0.025 % external ointment Apply topically 2 times daily x5-7 days (Patient not taking: Reported on 8/22/2024) 30 g 0     No facility-administered encounter medications on file as of 11/6/2024.             O:   NAD, WDWN, Alert & Oriented, Mood & Affect wnl, Vitals stable   General appearance normal   Vitals stable   Alert, oriented and in no acute distress     L calf stuck on plaque      Stuck on papules and brown macules on trunk and ext   Red papules on trunk  Flesh colored papules on trunk     The remainder of the full exam was normal; the following areas were examined:  conjunctiva/lids, , neck, peripheral vascular system, abdomen, lymph  nodes, digits/nails, eccrine and apocrine glands, scalp/hair, face, neck, chest, abdomen, buttocks, back, RUE, LUE, RLE, LLE       Eyes: Conjunctivae/lids:Normal     ENT: Lips, mucosa: normal    MSK:Normal    Cardiovascular: peripheral edema none    Pulm: Breathing Normal    Lymph Nodes: No Head and Neck Lymphadenopathy     Neuro/Psych: Orientation:Alert and Orientedx3 ; Mood/Affect:normal       A/P:  1. Seborrheic keratosis, lentigo, angioma, dermal nevus  Cryo discussed with patient    2. Angioma  Cosmetic destruction discussed with patient   It was a pleasure speaking to Monet You today.  Previous clinic notes and pertinent laboratory tests were reviewed prior to Monet You's visit.  Nature and genetics of benign skin lesions dicussed with patient.  Signs and Symptoms of skin cancer discussed with patient.  Patient encouraged to perform monthly skin exams.  UV precautions reviewed with patient.  Return to clinic 12 months      Again, thank you for allowing me to participate in the care of your patient.        Sincerely,        Ignacio Flores MD

## 2024-11-19 ENCOUNTER — THERAPY VISIT (OUTPATIENT)
Dept: PHYSICAL THERAPY | Facility: CLINIC | Age: 44
End: 2024-11-19
Attending: ADVANCED PRACTICE MIDWIFE
Payer: COMMERCIAL

## 2024-11-19 DIAGNOSIS — N39.46 MIXED STRESS AND URGE URINARY INCONTINENCE: ICD-10-CM

## 2024-11-19 PROCEDURE — 97161 PT EVAL LOW COMPLEX 20 MIN: CPT | Mod: GP | Performed by: PHYSICAL THERAPIST

## 2024-11-19 PROCEDURE — 97110 THERAPEUTIC EXERCISES: CPT | Mod: GP | Performed by: PHYSICAL THERAPIST

## 2024-11-19 PROCEDURE — 97530 THERAPEUTIC ACTIVITIES: CPT | Mod: GP | Performed by: PHYSICAL THERAPIST

## 2024-11-19 NOTE — PROGRESS NOTES
PHYSICAL THERAPY EVALUATION  Type of Visit: Evaluation        Fall Risk Screen:  Fall screen completed by: PT  Have you fallen 2 or more times in the past year?: No  Have you fallen and had an injury in the past year?: No  Is patient a fall risk?: No    Subjective         Presenting condition or subjective complaint: (Patient-Rptd) Leakage  Date of onset: 08/22/24    Relevant medical history:     Dates & types of surgery:      Prior diagnostic imaging/testing results:       Prior therapy history for the same diagnosis, illness or injury: (Patient-Rptd) No      Living Environment  Social support: (Patient-Rptd) With family members   Type of home: (Patient-Rptd) [facility]   Stairs to enter the home: (Patient-Rptd) No       Ramp: (Patient-Rptd) No   Stairs inside the home: (Patient-Rptd) Yes (Patient-Rptd) 16 Is there a railing: (Patient-Rptd) Yes     Help at home: (Patient-Rptd) None  Equipment owned:       Employment: (Patient-Rptd) Yes (Patient-Rptd) Rn  Hobbies/Interests:      Patient goals for therapy: (Patient-Rptd) No leakage    Pain assessment: pt reports some pain with bowel movements sometimes prior.     Objective      PELVIC EVALUATION  ADDITIONAL HISTORY:  Sex assigned at birth: (Patient-Rptd) Female  Gender identity: (Patient-Rptd) Female    Pronouns: (Patient-Rptd) She/Her Hers      Bladder History:  Feels bladder filling: (Patient-Rptd) Yes  Triggers for feeling of inability to wait to go to the bathroom: (Patient-Rptd) No    How long can you wait to urinate: (Patient-Rptd) Nut long  Gets up at night to urinate: (Patient-Rptd) Yes    Can stop the flow of urine when urinating: (Patient-Rptd) Yes  Volume of urine usually released: (Patient-Rptd) Large   Other issues: (Patient-Rptd) Dribbling after urinating  Number of bladder infections in last 12 months:    Fluid intake per day: (Patient-Rptd) 1L      Medications taken for bladder: (Patient-Rptd) No     Activities causing urine leak: (Patient-Rptd) Cough;  Sneeze; Jump; Hurrying to the bathroom due to a strong urge to urinate (pee)    Amount of urine typically leaked: (Patient-Rptd) 5ml  Pads used to help with leaking: (Patient-Rptd) No        Bowel History:  Frequency of bowel movement: (Patient-Rptd) Daily  Consistency of stool: (Patient-Rptd) Soft    Ignores the urge to defecate: (Patient-Rptd) No  Other bowel issues:    Length of time spent trying to have a bowel movement:      Sexual Function History:  Sexual orientation: (Patient-Rptd) Straight    Sexually active: (Patient-Rptd) Yes  Lubrication used: (Patient-Rptd) No (Patient-Rptd) No  Pelvic pain:      Pain or difficulty with orgasms/erection/ejaculation: (Patient-Rptd) No    State of menopause: (Patient-Rptd) Perimenopause (have not gone through menopause yet)  Hormone medications: (Patient-Rptd) No      Are you currently pregnant: (Patient-Rptd) No  Number of previous pregnancies: (Patient-Rptd) 4  Number of deliveries: (Patient-Rptd) 2  If you have delivered before, did you have any of these issues during delivery: (Patient-Rptd) Tearing  Have you been diagnosed with pelvic prolapse or abdominal separation: (Patient-Rptd) No  Do you get regular exercise: (Patient-Rptd) Yes  I do this type of exercise: (Patient-Rptd) Yoga  Have you tried pelvic floor strengthening exercises for 4 weeks: (Patient-Rptd) No  Do you have any history of trauma that is relevant to your care that you d like to share: (Patient-Rptd) No      Discussed reason for referral regarding pelvic health needs and external/internal pelvic floor muscle examination with patient/guardian.  Opportunity provided to ask questions and verbal consent for assessment and intervention was given.    PAIN: pt reports sometimes feels a bulge in the vagina, more between the vagina and the rectum. Pt reports sometimes feels a fullness with bowel movements and then will have relief of this symptoms after having a bowel movement, sometimes needing to go more  than once to void. Symptoms are worse if harder stool occurs.  POSTURE: good posture demo seated and standing.  HIP SCREEN:   Strength: glute max strength 3/5 B, L glute med and L adductor 3/5; R glute med 3+/5, R adductor 3+/5.   Functional Strength Testing: pt reports more hamstring fatigue,       ABDOMINAL ASSESSMENT    Abdominal Activation/Strength: Leonard lowering test (measured in degrees): 3/5, sit up test 4/5      Assessment & Plan   CLINICAL IMPRESSIONS  Medical Diagnosis: Mixed Incontinence    Treatment Diagnosis: Mixed Incontinence   Impression/Assessment: Patient is a 44 year old female with Mixed Incontinence complaints.  The following significant findings have been identified: Decreased strength, Impaired muscle performance, and Decreased activity tolerance. These impairments interfere with their ability to perform self care tasks, work tasks, recreational activities, household chores, household mobility, and community mobility as compared to previous level of function.     Clinical Decision Making (Complexity):  Clinical Presentation: Stable/Uncomplicated  Clinical Presentation Rationale: based on medical and personal factors listed in PT evaluation  Clinical Decision Making (Complexity): Low complexity    PLAN OF CARE  Treatment Interventions:  Interventions: Neuromuscular Re-education, Therapeutic Activity, Therapeutic Exercise    Long Term Goals     PT Goal 1  Goal Identifier: Mixed Incontinence  Goal Description: pt will be able to make it to the bathroom, cough sneeze with no urinary leakage  Rationale: to maximize safety and independence with performance of ADLs and functional tasks  Target Date: 01/28/25      Frequency of Treatment: 1x every other week  Duration of Treatment: 10 weeks    Recommended Referrals to Other Professionals:   Education Assessment:   Learner/Method: Patient;Pictures/Video;No Barriers to Learning    Risks and benefits of evaluation/treatment have been explained.    Patient/Family/caregiver agrees with Plan of Care.     Evaluation Time:     PT Ester, Low Complexity Minutes (58731): 15       Signing Clinician: Vee Alvarado PT

## 2025-02-12 ENCOUNTER — THERAPY VISIT (OUTPATIENT)
Dept: PHYSICAL THERAPY | Facility: CLINIC | Age: 45
End: 2025-02-12
Attending: ADVANCED PRACTICE MIDWIFE
Payer: COMMERCIAL

## 2025-02-12 DIAGNOSIS — N39.46 MIXED STRESS AND URGE URINARY INCONTINENCE: Primary | ICD-10-CM

## 2025-02-12 PROCEDURE — 97530 THERAPEUTIC ACTIVITIES: CPT | Mod: GP | Performed by: PHYSICAL THERAPIST

## 2025-02-12 PROCEDURE — 97110 THERAPEUTIC EXERCISES: CPT | Mod: GP | Performed by: PHYSICAL THERAPIST

## 2025-07-21 ENCOUNTER — PATIENT OUTREACH (OUTPATIENT)
Dept: CARE COORDINATION | Facility: CLINIC | Age: 45
End: 2025-07-21
Payer: COMMERCIAL

## 2025-08-07 ENCOUNTER — PATIENT OUTREACH (OUTPATIENT)
Dept: CARE COORDINATION | Facility: CLINIC | Age: 45
End: 2025-08-07
Payer: COMMERCIAL